# Patient Record
Sex: MALE | Race: WHITE | Employment: FULL TIME | URBAN - METROPOLITAN AREA
[De-identification: names, ages, dates, MRNs, and addresses within clinical notes are randomized per-mention and may not be internally consistent; named-entity substitution may affect disease eponyms.]

---

## 2017-03-01 ENCOUNTER — OFFICE VISIT (OUTPATIENT)
Dept: URGENT CARE | Facility: URGENT CARE | Age: 47
End: 2017-03-01
Payer: COMMERCIAL

## 2017-03-01 VITALS
OXYGEN SATURATION: 95 % | BODY MASS INDEX: 28.87 KG/M2 | SYSTOLIC BLOOD PRESSURE: 122 MMHG | TEMPERATURE: 97.1 F | HEART RATE: 103 BPM | WEIGHT: 207 LBS | DIASTOLIC BLOOD PRESSURE: 80 MMHG

## 2017-03-01 DIAGNOSIS — J01.90 ACUTE SINUSITIS WITH COEXISTING CONDITION, NEED PROPHYLACTIC TREATMENT: Primary | ICD-10-CM

## 2017-03-01 DIAGNOSIS — J45.40 MODERATE PERSISTENT ASTHMA WITHOUT COMPLICATION: ICD-10-CM

## 2017-03-01 PROCEDURE — 99214 OFFICE O/P EST MOD 30 MIN: CPT | Performed by: PHYSICIAN ASSISTANT

## 2017-03-01 RX ORDER — CEFDINIR 300 MG/1
300 CAPSULE ORAL 2 TIMES DAILY
Qty: 20 CAPSULE | Refills: 0 | Status: SHIPPED | OUTPATIENT
Start: 2017-03-01 | End: 2017-04-03

## 2017-03-01 NOTE — LETTER
[facility]  [address] [id number] [phone]      March 1, 2017    RE:  Enrico Pedroza                                                                                                                                                       [address] [facility] [address]            To whom it may concern:    Enrico Pedroza was seen in clinic today for illness.  He may return to work when he has been fever free for 24 hours.          Sincerely,        Dorcas Roberts    [facility]

## 2017-03-01 NOTE — PROGRESS NOTES
SUBJECTIVE:   Enrico Pedroza is a 46 year old male presenting with a chief complaint of:  1) sinus congestion for the past week, worsening  2) cough for the past week  3) some wheezing.    Onset of symptoms was 1 week(s) ago.  Course of illness is worsening.    Severity moderate  Current and Associated symptoms: as above  Treatment measures tried include xopenex inhaler.  Has not used asmanex in about a month.  Predisposing factors include HX of asthma.    Past Medical History   Diagnosis Date     Allergic rhinitis, cause unspecified      Intermittent asthma      exercise/allergies/molds     Nephrolithiasis      Patient Active Problem List   Diagnosis     Allergic rhinitis     Lumbago     CARDIOVASCULAR SCREENING; LDL GOAL LESS THAN 160     Elbow pain     Moderate persistent asthma     Ureteral stone     Social History   Substance Use Topics     Smoking status: Former Smoker     Quit date: 1/1/1995     Smokeless tobacco: Never Used     Alcohol use 0.5 - 1.0 oz/week     1 - 2 drink(s) per week      Comment: once weekly       ROS:  CONSTITUTIONAL:NEGATIVE for fever, chills, change in weight  INTEGUMENTARY/SKIN: NEGATIVE for worrisome rashes, moles or lesions  EYES: NEGATIVE for vision changes or irritation  ENT/MOUTH: as per HPI  RESP:as per HPI  CV: NEGATIVE for chest pain, palpitations or peripheral edema  GI: NEGATIVE for nausea, abdominal pain, heartburn, or change in bowel habits  : negative for dysuria, hematuria, decreased urinary stream, erectile dysfunction  MUSCULOSKELETAL: NEGATIVE for significant arthralgias or myalgia  ENDOCRINE: NEGATIVE for temperature intolerance, skin/hair changes    OBJECTIVE  :/80  Pulse 103  Temp 97.1  F (36.2  C) (Oral)  Wt 207 lb (93.9 kg)  SpO2 95%  BMI 28.87 kg/m2  GENERAL APPEARANCE: healthy, alert and no distress  EYES: EOMI,  PERRL, conjunctiva clear  HENT: ear canals and TM's normal.  Nose and mouth without ulcers, erythema or lesions  HENT: nasal turbinates  boggy with bluish hue and rhinorrhea yellow  NECK: supple, nontender, no lymphadenopathy  RESP: lungs clear to auscultation - no rales, rhonchi or wheezes  CV: regular rates and rhythm, normal S1 S2, no murmur noted  ABDOMEN:  soft, nontender, no HSM or masses and bowel sounds normal  NEURO: Normal strength and tone, sensory exam grossly normal,  normal speech and mentation  SKIN: no suspicious lesions or rashes    (J01.90) Acute sinusitis with coexisting condition, need prophylactic treatment  (primary encounter diagnosis)  Comment:with underlying allergies  Plan: cefdinir (OMNICEF) 300 MG capsule        Restart flonase    (J45.40) Moderate persistent asthma without complication  Comment:   Plan: xopenex.  Restart Asmanex.      F/U with PCP should symptoms persist or worsen.    Patient expresses understanding and agreement with the assessment and plan as above.

## 2017-03-01 NOTE — NURSING NOTE
"Chief Complaint   Patient presents with     Cough     cough, chest congestion, headache, running stuffy nose and very fatigue for 1+ weeks.        Initial /80  Pulse 103  Temp 97.1  F (36.2  C) (Oral)  Wt 207 lb (93.9 kg)  SpO2 95%  BMI 28.87 kg/m2 Estimated body mass index is 28.87 kg/(m^2) as calculated from the following:    Height as of 11/21/16: 5' 11\" (1.803 m).    Weight as of this encounter: 207 lb (93.9 kg).  Medication Reconciliation: complete    "

## 2017-03-01 NOTE — MR AVS SNAPSHOT
"              After Visit Summary   3/1/2017    Enrico Pedroza    MRN: [id number]           Patient Information     Date Of Birth          1970        Visit Information        Provider Department      3/1/2017 9:45 AM Dorcas Cerna PA-C [facility]        Today's Diagnoses     Acute sinusitis with coexisting condition, need prophylactic treatment    -  1    Moderate persistent asthma without complication           Follow-ups after your visit        Who to contact     If you have questions or need follow up information about today's clinic visit or your schedule please contact [facility] directly at [phone].  Normal or non-critical lab and imaging results will be communicated to you by MyChart, letter or phone within 4 business days after the clinic has received the results. If you do not hear from us within 7 days, please contact the clinic through MyChart or phone. If you have a critical or abnormal lab result, we will notify you by phone as soon as possible.  Submit refill requests through Assembly or call your pharmacy and they will forward the refill request to us. Please allow 3 business days for your refill to be completed.          Additional Information About Your Visit        MyChart Information     Assembly lets you send messages to your doctor, view your test results, renew your prescriptions, schedule appointments and more. To sign up, go to [email or web address] . Click on \"Log in\" on the left side of the screen, which will take you to the Welcome page. Then click on \"Sign up Now\" on the right side of the page.     You will be asked to enter the access code listed below, as well as some personal information. Please follow the directions to create your username and password.     Your access code is: [email or web address]  Expires: 2017 10:40 AM     Your access code will  in 90 days. If you need help or a new code, please " call your [place] clinic or [phone].        Care EveryWhere ID     This is your Care EveryWhere ID. This could be used by other organizations to access your [place] medical records  [id number]        Your Vitals Were     Pulse Temperature Pulse Oximetry BMI (Body Mass Index)          103 97.1  F (36.2  C) (Oral) 95% 28.87 kg/m2         Blood Pressure from Last 3 Encounters:   03/01/17 122/80   11/21/16 110/80   08/16/16 110/62    Weight from Last 3 Encounters:   03/01/17 207 lb (93.9 kg)   11/21/16 213 lb 6.4 oz (96.8 kg)   08/16/16 210 lb (95.3 kg)              Today, you had the following     No orders found for display         Today's Medication Changes          These changes are accurate as of: 3/1/17 10:40 AM.  If you have any questions, ask your nurse or doctor.               Start taking these medicines.        Dose/Directions    cefdinir 300 MG capsule   Commonly known as:  OMNICEF   Used for:  Acute sinusitis with coexisting condition, need prophylactic treatment        Dose:  300 mg   Take 1 capsule (300 mg) by mouth 2 times daily   Quantity:  20 capsule   Refills:  0            Where to get your medicines      These medications were sent to [facility]/pharmacy [address] [place] [address] [address] [address]     Phone:  [phone]     cefdinir 300 MG capsule                Primary Care Provider Office Phone # Fax #    William Green [phone]       [facility] [address] [facility] [address]        Thank you!     Thank you for choosing [facility]  for your care. Our goal is always to provide you with excellent care. Hearing back from our patients is one way we can continue to improve our services. Please take a few minutes to complete the written survey that you may receive in the mail after your visit with us. Thank you!             Your Updated Medication List - Protect others  around you: Learn how to safely use, store and throw away your medicines at www.disposemymeds.org.          This list is accurate as of: 3/1/17 10:40 AM.  Always use your most recent med list.                   Brand Name Dispense Instructions for use    cefdinir 300 MG capsule    OMNICEF    20 capsule    Take 1 capsule (300 mg) by mouth 2 times daily       cetirizine 10 MG tablet    zyrTEC    90 tablet    Take 10 mg by mouth daily as needed       ibuprofen 600 MG tablet    ADVIL/MOTRIN    30 tablet    Take 1 tablet (600 mg) by mouth every 6 hours as needed for moderate pain       levalbuterol 45 MCG/ACT Inhaler    XOPENEX HFA    1 Inhaler    Inhale 1-2 puffs into the lungs every 6 hours as needed for shortness of breath / dyspnea       meclizine 25 MG tablet    ANTIVERT    60    1-2 TABLETS  EVERY 6 HOURS AS NEED FOR  DIZZNESS       mometasone 110 MCG/INH inhaler    ASMANEX 30 METERED DOSES    1 Inhaler    Inhale 1 puff into the lungs daily       mometasone 50 MCG/ACT spray    NASONEX    17 g    Spray 2 sprays into both nostrils daily       OMEPRAZOLE PO      Take by mouth every morning

## 2017-03-08 ENCOUNTER — TELEPHONE (OUTPATIENT)
Dept: INTERNAL MEDICINE | Facility: CLINIC | Age: 47
End: 2017-03-08

## 2017-03-08 DIAGNOSIS — J45.40 MODERATE PERSISTENT ASTHMA WITHOUT COMPLICATION: ICD-10-CM

## 2017-03-08 RX ORDER — LEVALBUTEROL TARTRATE 45 UG/1
1-2 AEROSOL, METERED ORAL EVERY 6 HOURS PRN
Qty: 1 INHALER | Refills: 0 | Status: SHIPPED | OUTPATIENT
Start: 2017-03-08 | End: 2017-04-03

## 2017-03-08 NOTE — TELEPHONE ENCOUNTER
Routing refill request to provider for review/approval because:  Patient needs to be seen because:  More than 6 months since last office visit.  Last seen 6/2016.

## 2017-03-08 NOTE — TELEPHONE ENCOUNTER
Reason for Call:  Medication or medication refill:    Do you use a [facility]?  Name of the pharmacy and phone number for the current request:  [facility]ACY #[id number] - [place], [place] - [address] [facility]    Name of the medication requested: levalbuterol (XOPENEX HFA) 45 MCG/ACT inhaler    Other request:     Can we leave a detailed message on this number? YES    Phone number patient can be reached at: Cell number on file:    Telephone Information:   Mobile [phone]       Best Time: anytime    Call taken on 3/8/2017 at 1:53 PM by MIL ARROYO

## 2017-03-08 NOTE — TELEPHONE ENCOUNTER
Prescription approved per [facility].  Given one month extension, needs MD apt.

## 2017-03-08 NOTE — TELEPHONE ENCOUNTER
levalbuterol (XOPENEX HFA) 45 MCG/ACT inhaler       Last Written Prescription Date: 6/6/16  Last Fill Quantity: 1, # refills: 11    Last Office Visit with [facility], P or [facility] prescribing provider:  7/20/16   Future Office Visit:       Date of Last Asthma Action Plan Letter:   Asthma Action Plan Q1 Year    Topic Date Due     Asthma Action Plan - yearly  03/20/2015      Asthma Control Test:   ACT Total Scores 6/6/2016   ACT TOTAL SCORE -   ASTHMA ER VISITS -   ASTHMA HOSPITALIZATIONS -   ACT TOTAL SCORE (Goal Greater than or Equal to 20) 20   In the past 12 months, how many times did you visit the emergency room for your asthma without being admitted to the hospital? 0   In the past 12 months, how many times were you hospitalized overnight because of your asthma? 0       Date of Last Spirometry Test:   No results found for this or any previous visit.

## 2017-03-09 DIAGNOSIS — J45.40 MODERATE PERSISTENT ASTHMA WITHOUT COMPLICATION: ICD-10-CM

## 2017-03-09 RX ORDER — LEVALBUTEROL TARTRATE 45 UG/1
1-2 AEROSOL, METERED ORAL EVERY 6 HOURS PRN
Qty: 1 INHALER | Refills: 0 | Status: CANCELLED | OUTPATIENT
Start: 2017-03-09

## 2017-03-09 NOTE — TELEPHONE ENCOUNTER
levalbuterol (XOPENEX HFA) 45 MCG/ACT        Last Written Prescription Date: 3/8/17  Last Fill Quantity: 1, # refills: 0    Last Office Visit with [facility], [facility] or [facility] prescribing provider:  7/20/16   Future Office Visit:       Date of Last Asthma Action Plan Letter:   Asthma Action Plan Q1 Year    Topic Date Due     Asthma Action Plan - yearly  03/20/2015      Asthma Control Test:   ACT Total Scores 6/6/2016   ACT TOTAL SCORE -   ASTHMA ER VISITS -   ASTHMA HOSPITALIZATIONS -   ACT TOTAL SCORE (Goal Greater than or Equal to 20) 20   In the past 12 months, how many times did you visit the emergency room for your asthma without being admitted to the hospital? 0   In the past 12 months, how many times were you hospitalized overnight because of your asthma? 0       Date of Last Spirometry Test:   No results found for this or any previous visit.

## 2017-04-03 ENCOUNTER — TELEPHONE (OUTPATIENT)
Dept: INTERNAL MEDICINE | Facility: CLINIC | Age: 47
End: 2017-04-03

## 2017-04-03 ENCOUNTER — OFFICE VISIT (OUTPATIENT)
Dept: INTERNAL MEDICINE | Facility: CLINIC | Age: 47
End: 2017-04-03
Payer: COMMERCIAL

## 2017-04-03 VITALS
SYSTOLIC BLOOD PRESSURE: 126 MMHG | WEIGHT: 206.5 LBS | DIASTOLIC BLOOD PRESSURE: 84 MMHG | BODY MASS INDEX: 28.91 KG/M2 | TEMPERATURE: 98 F | HEART RATE: 90 BPM | HEIGHT: 71 IN | OXYGEN SATURATION: 93 %

## 2017-04-03 DIAGNOSIS — J45.40 MODERATE PERSISTENT ASTHMA WITHOUT COMPLICATION: Primary | ICD-10-CM

## 2017-04-03 DIAGNOSIS — J30.1 SEASONAL ALLERGIC RHINITIS DUE TO POLLEN: ICD-10-CM

## 2017-04-03 DIAGNOSIS — K52.9 CHRONIC DIARRHEA: ICD-10-CM

## 2017-04-03 PROCEDURE — 99214 OFFICE O/P EST MOD 30 MIN: CPT | Performed by: INTERNAL MEDICINE

## 2017-04-03 RX ORDER — LEVALBUTEROL TARTRATE 45 UG/1
1-2 AEROSOL, METERED ORAL EVERY 6 HOURS PRN
Qty: 1 INHALER | Refills: 11 | Status: SHIPPED | OUTPATIENT
Start: 2017-04-03 | End: 2018-06-26

## 2017-04-03 NOTE — PATIENT INSTRUCTIONS
Can try one of the following over the counter antihistamines:  Loratadine (Claritin)  Fexofenadine (allegra)  Cetirizine (zyrtec)  Levocetirizine (xyzal)    Can be taken as needed     If you still have symptoms despite using these try adding one of the following nasal steroids available over the counter:  -flonase  -nasacort  -rhinocort    These need to be taken daily in order to work     High Fiber Diet  Fiber is present in fruits, vegetables, cereals, and grains. Fiber passes through the body undigested. A high fiber diet helps food move through the intestinal tract. The added bulk is helpful in preventing constipation. In people with diverticulosis it helps clean out the pouches along the colon wall and prevents new ones from forming. A high fiber diet also reduces the risk of colon cancer, decreases blood cholesterol, and prevents high blood sugar in people with diabetes.    The foods listed below are high in fiber and should be included in your diet. If you are not used to high fiber foods, start with 1 or 2 foods from this list. Every 3 to 4 days add a new one to your diet until you are eating 4 high fiber foods per day. This should give you 20 to 35 grams of fiber/day. It is also important to drink a lot of water when you are on this diet (6 to 8 glasses a day). Water causes the fiber to swell and increases the benefit.  Foods high in dietary fiber  The following foods are high in dietary fiber:    Breads. Breads made with 100% whole wheat flour; marybel, wheat, or rye crackers; whole grain tortillas, bran muffins.    Cereals. Whole grain and bran cereals with bran (shredded wheat, wheat flakes, raisin bran, corn bran), oatmeal, rolled oats, granola, and brown rice.    Nuts. Any nuts and seeds.    Fruits. Fresh fruits along with edible skins (bananas, citrus fruit, mangoes, pears, prunes, raisins, berries, apples, pineapple, and apricots) and prune juice.    Vegetables. All types, preferably raw or lightly  cooked: especially green peas, celery, eggplant, potatoes, spinach, broccoli, brussels sprouts, winter squash, carrots, cauliflower, soybeans, lentils, fresh and dried beans of all kinds.    Other. Popcorn, any spices.    9384-4966 [employer]. [address] [place] [address]. All rights reserved. This information is not intended as a substitute for professional medical care. Always follow your healthcare professional's instructions.

## 2017-04-03 NOTE — PROGRESS NOTES
"  SUBJECTIVE:                                                    Enrico Pedroza is a 46 year old male who presents to clinic today for the following health issues:    1. Asthma Follow-Up    Was ACT completed today?    Yes    ACT Total Scores 4/3/2017   ACT TOTAL SCORE -   ASTHMA ER VISITS -   ASTHMA HOSPITALIZATIONS -   ACT TOTAL SCORE (Goal Greater than or Equal to 20) 20   In the past 12 months, how many times did you visit the emergency room for your asthma without being admitted to the hospital? 0   In the past 12 months, how many times were you hospitalized overnight because of your asthma? 0       Recent asthma triggers that patient is dealing with: None      Amount of exercise or physical activity: None    Problems taking medications regularly: No    Medication side effects: none    Diet: regular (no restrictions)    2. Chronic diarrhea  - never mentioned prior to today but reports yrs of loose , freq stools. No weight loss. No constipation.   - No change with lactose elimination. No other dietary changes attempted  - no BRBPR or any blood w/diarrhea  - no cramps or abd pain     Problem list and histories reviewed & adjusted, as indicated.  Additional history: as documented    Labs reviewed in EPIC    Reviewed and updated as needed this visit by clinical staff  Tobacco  Allergies  Soc Hx      Reviewed and updated as needed this visit by Provider         ROS:  Constitutional, HEENT, cardiovascular, pulmonary, gi and gu systems are negative, except as otherwise noted.    OBJECTIVE:                                                    /84  Pulse 90  Temp 98  F (36.7  C) (Oral)  Ht 5' 11\" (1.803 m)  Wt 206 lb 8 oz (93.7 kg)  SpO2 93%  BMI 28.8 kg/m2  Body mass index is 28.8 kg/(m^2).  GENERAL APPEARANCE: healthy, alert and no distress  HENT: nose and mouth without ulcers or lesions  NECK: no adenopathy, no asymmetry, masses, or scars and thyroid normal to palpation  RESP: lungs clear to " auscultation - no rales, rhonchi or wheezes  CV: regular rates and rhythm, normal S1 S2, no S3 or S4 and no murmur, click or rub  ABDOMEN: soft, nontender, without hepatosplenomegaly or masses and bowel sounds normal  MS: extremities normal- no gross deformities noted  SKIN: no suspicious lesions or rashes    Diagnostic test results:  none      ASSESSMENT/PLAN:                                                    1. Moderate persistent asthma without complication  Due to allergies- not well controlled  - levalbuterol (XOPENEX HFA) 45 MCG/ACT Inhaler; Inhale 1-2 puffs into the lungs every 6 hours as needed for shortness of breath / dyspnea  Dispense: 1 Inhaler; Refill: 11  - mometasone (ASMANEX 30 METERED DOSES) 110 MCG/INH inhaler; Inhale 1 puff into the lungs daily  Dispense: 1 Inhaler; Refill: 11    2. Seasonal allergic rhinitis due to pollen  AH + nasal steroid    3. Chronic diarrhea  W/u with labs, stool studies, colonoscopy       Follow up with Provider - prpedro Green MD  [facility]

## 2017-04-03 NOTE — NURSING NOTE
"Chief Complaint   Patient presents with     Asthma       Initial /84  Pulse 90  Temp 98  F (36.7  C) (Oral)  Ht 5' 11\" (1.803 m)  Wt 206 lb 8 oz (93.7 kg)  SpO2 93%  BMI 28.8 kg/m2 Estimated body mass index is 28.8 kg/(m^2) as calculated from the following:    Height as of this encounter: 5' 11\" (1.803 m).    Weight as of this encounter: 206 lb 8 oz (93.7 kg).  Medication Reconciliation: complete    "

## 2017-04-03 NOTE — MR AVS SNAPSHOT
After Visit Summary   4/3/2017    Enrico Pedroza    MRN: [id number]           Patient Information     Date Of Birth          1970        Visit Information        Provider Department      4/3/2017 8:40 AM William Green MD [facility]        Today's Diagnoses     Moderate persistent asthma without complication    -  1    Seasonal allergic rhinitis due to pollen        Chronic diarrhea          Care Instructions    Can try one of the following over the counter antihistamines:  Loratadine (Claritin)  Fexofenadine (allegra)  Cetirizine (zyrtec)  Levocetirizine (xyzal)    Can be taken as needed     If you still have symptoms despite using these try adding one of the following nasal steroids available over the counter:  -flonase  -nasacort  -rhinocort    These need to be taken daily in order to work     High Fiber Diet  Fiber is present in fruits, vegetables, cereals, and grains. Fiber passes through the body undigested. A high fiber diet helps food move through the intestinal tract. The added bulk is helpful in preventing constipation. In people with diverticulosis it helps clean out the pouches along the colon wall and prevents new ones from forming. A high fiber diet also reduces the risk of colon cancer, decreases blood cholesterol, and prevents high blood sugar in people with diabetes.    The foods listed below are high in fiber and should be included in your diet. If you are not used to high fiber foods, start with 1 or 2 foods from this list. Every 3 to 4 days add a new one to your diet until you are eating 4 high fiber foods per day. This should give you 20 to 35 grams of fiber/day. It is also important to drink a lot of water when you are on this diet (6 to 8 glasses a day). Water causes the fiber to swell and increases the benefit.  Foods high in dietary fiber  The following foods are high in dietary fiber:    Breads. Breads made with 100% whole wheat flour;  marybel, wheat, or rye crackers; whole grain tortillas, bran muffins.    Cereals. Whole grain and bran cereals with bran (shredded wheat, wheat flakes, raisin bran, corn bran), oatmeal, rolled oats, granola, and brown rice.    Nuts. Any nuts and seeds.    Fruits. Fresh fruits along with edible skins (bananas, citrus fruit, mangoes, pears, prunes, raisins, berries, apples, pineapple, and apricots) and prune juice.    Vegetables. All types, preferably raw or lightly cooked: especially green peas, celery, eggplant, potatoes, spinach, broccoli, brussels sprouts, winter squash, carrots, cauliflower, soybeans, lentils, fresh and dried beans of all kinds.    Other. Popcorn, any spices.    5441-1940 The [employer]. [address] [place] [address]. All rights reserved. This information is not intended as a substitute for professional medical care. Always follow your healthcare professional's instructions.              Follow-ups after your visit        Who to contact     If you have questions or need follow up information about today's clinic visit or your schedule please contact [facility] directly at [phone].  Normal or non-critical lab and imaging results will be communicated to you by Audiosockethart, letter or phone within 4 business days after the clinic has received the results. If you do not hear from us within 7 days, please contact the clinic through Audiosockethart or phone. If you have a critical or abnormal lab result, we will notify you by phone as soon as possible.  Submit refill requests through nWay or call your pharmacy and they will forward the refill request to us. Please allow 3 business days for your refill to be completed.          Additional Information About Your Visit        nWay Information     nWay lets you send messages to your doctor, view your test results, renew your prescriptions, schedule appointments and more. To sign up, go to  [email or web address] . Click on \"Log in\" on the left side of the screen, which will take you to the Welcome page. Then click on \"Sign up Now\" on the right side of the page.     You will be asked to enter the access code listed below, as well as some personal information. Please follow the directions to create your username and password.     Your access code is: [email or web address]  Expires: 2017 11:40 AM     Your access code will  in 90 days. If you need help or a new code, please call your [place] clinic or [phone].        Care EveryWhere ID     This is your Care EveryWhere ID. This could be used by other organizations to access your [place] medical records  [id number]        Your Vitals Were     Pulse Temperature Height Pulse Oximetry BMI (Body Mass Index)       90 98  F (36.7  C) (Oral) 5' 11\" (1.803 m) 93% 28.8 kg/m2        Blood Pressure from Last 3 Encounters:   17 126/84   17 122/80   16 110/80    Weight from Last 3 Encounters:   17 206 lb 8 oz (93.7 kg)   17 207 lb (93.9 kg)   16 213 lb 6.4 oz (96.8 kg)              Today, you had the following     No orders found for display         Today's Medication Changes          These changes are accurate as of: 4/3/17  9:18 AM.  If you have any questions, ask your nurse or doctor.               These medicines have changed or have updated prescriptions.        Dose/Directions    * mometasone 110 MCG/INH inhaler   Commonly known as:  ASMANEX 30 METERED DOSES   This may have changed:  Another medication with the same name was added. Make sure you understand how and when to take each.   Used for:  Moderate persistent asthma without complication   Changed by:  William Green MD        Dose:  1 puff   Inhale 1 puff into the lungs daily   Quantity:  1 Inhaler   Refills:  11       * mometasone 110 MCG/INH inhaler   Commonly known as:  ASMANEX 30 METERED DOSES   This may have changed:  You were already taking a " medication with the same name, and this prescription was added. Make sure you understand how and when to take each.   Used for:  Moderate persistent asthma without complication   Changed by:  William Green MD        Dose:  1 puff   Inhale 1 puff into the lungs daily   Quantity:  1 Inhaler   Refills:  11       * Notice:  This list has 2 medication(s) that are the same as other medications prescribed for you. Read the directions carefully, and ask your doctor or other care provider to review them with you.         Where to get your medicines      These medications were sent to [facility]/pharmacy #[id number] [place], [place] - [address]  [address] [address] [address]     Phone:  [phone]     levalbuterol 45 MCG/ACT Inhaler    mometasone 110 MCG/INH inhaler                Primary Care Provider Office Phone # Fax #    William Green [phone]       [facility] [address] [facility] [address]        Thank you!     Thank you for choosing [facility]  for your care. Our goal is always to provide you with excellent care. Hearing back from our patients is one way we can continue to improve our services. Please take a few minutes to complete the written survey that you may receive in the mail after your visit with us. Thank you!             Your Updated Medication List - Protect others around you: Learn how to safely use, store and throw away your medicines at www.disposemymeds.org.          This list is accurate as of: 4/3/17  9:18 AM.  Always use your most recent med list.                   Brand Name Dispense Instructions for use    cetirizine 10 MG tablet    zyrTEC    90 tablet    Take 10 mg by mouth daily as needed       ibuprofen 600 MG tablet    ADVIL/MOTRIN    30 tablet    Take 1 tablet (600 mg) by mouth every 6 hours as needed for moderate pain       levalbuterol 45 MCG/ACT Inhaler    XOPENEX HFA    1 Inhaler    Inhale 1-2  puffs into the lungs every 6 hours as needed for shortness of breath / dyspnea       meclizine 25 MG tablet    ANTIVERT    60    1-2 TABLETS  EVERY 6 HOURS AS NEED FOR  DIZZNESS       * mometasone 110 MCG/INH inhaler    ASMANEX 30 METERED DOSES    1 Inhaler    Inhale 1 puff into the lungs daily       * mometasone 110 MCG/INH inhaler    ASMANEX 30 METERED DOSES    1 Inhaler    Inhale 1 puff into the lungs daily       mometasone 50 MCG/ACT spray    NASONEX    17 g    Spray 2 sprays into both nostrils daily       OMEPRAZOLE PO      Take by mouth every morning       * Notice:  This list has 2 medication(s) that are the same as other medications prescribed for you. Read the directions carefully, and ask your doctor or other care provider to review them with you.

## 2017-04-03 NOTE — TELEPHONE ENCOUNTER
call pt  Tell him that he should schedule a lab only appt for some blood work and  stool collection devices for stool studies for chronic diarrhea  Colonoscopy also scheduled

## 2017-04-04 ASSESSMENT — ASTHMA QUESTIONNAIRES: ACT_TOTALSCORE: 20

## 2017-04-18 ENCOUNTER — OFFICE VISIT (OUTPATIENT)
Dept: INTERNAL MEDICINE | Facility: CLINIC | Age: 47
End: 2017-04-18
Payer: COMMERCIAL

## 2017-04-18 VITALS
TEMPERATURE: 98.2 F | OXYGEN SATURATION: 96 % | BODY MASS INDEX: 34.62 KG/M2 | SYSTOLIC BLOOD PRESSURE: 108 MMHG | HEART RATE: 90 BPM | DIASTOLIC BLOOD PRESSURE: 82 MMHG | HEIGHT: 64 IN | WEIGHT: 202.8 LBS

## 2017-04-18 DIAGNOSIS — K57.92 ACUTE DIVERTICULITIS: Primary | ICD-10-CM

## 2017-04-18 PROCEDURE — 99214 OFFICE O/P EST MOD 30 MIN: CPT | Performed by: INTERNAL MEDICINE

## 2017-04-18 NOTE — PATIENT INSTRUCTIONS
*  Most likely diverticulitis, (infection of a diverticulum in your sigmoid colon)    *  Antibiotic:  Augmentin 1 tablet twice per day every day for 10 days.    *  Most common side effect from Augmentin is loose stools or diarrhea.  Let us know if this is a deal breaker for taking this    *  If you develop any worsening of your pain or significant worsening in your symptoms, then go to the hospital immediately.     *  Delay the scheduled labs from Dr. Green for another couple of weeks.     *  Do NOT get any colonoscopy until at least 6-8 weeks after the resolution of this current episode.     *  You need to proceed with the workup on the chronic diarrhea as planned, just need to delay things a bit due to this acute infection.

## 2017-04-18 NOTE — PROGRESS NOTES
SUBJECTIVE:                                                    Enrico Pedroza is a 46 year old male who presents to clinic today for the following health issues:    Concern - Diverticulitis/diarrhea     Onset: 3 days ago    Description:   Sharp and dull pain in lower abd area    Intensity: moderate, severe    Progression of Symptoms:  constant    Accompanying Signs & Symptoms:  Diarrhea, worsened by coughing, pressure on lower abd area.Pt has not has BM in a couple days but when he does it is normally diarrhea       Previous history of similar problem:   Yes, has hx of diverticulitis. Was advised by Dr. Green to get colonoscopy    Precipitating factors:   Worsened by: not able to figure out which foods aggravate    Alleviating factors:  Improved by: as long as pt is not standing       Therapies Tried and outcome: abx and liquid diet w/ relief in the past          Problem list and histories reviewed & adjusted, as indicated.  Additional history: as documented        Reviewed and updated as needed this visit by clinical staff  Tobacco  Allergies       Reviewed and updated as needed this visit by Provider           Past Medical History:  ---------------------------  Past Medical History:   Diagnosis Date     Allergic rhinitis, cause unspecified      Intermittent asthma     exercise/allergies/molds     Nephrolithiasis        Past Surgical History:  ---------------------------  Past Surgical History:   Procedure Laterality Date     COMBINED CYSTOSCOPY, RETROGRADES, URETEROSCOPY, INSERT STENT  3/5/2014    Procedure: COMBINED CYSTOSCOPY, RETROGRADES, URETEROSCOPY, INSERT STENT;  CYSTOSCOPY, FLEXIBLE URETEROSCOPY, LEFT STENT PLACEMENT, LEFT RETROGRADES, STONE EXTRACTION;  Surgeon: Kenny Marroquin MD;  Location: [facility] OR     NO HISTORY OF SURGERY         Current Medications:  ---------------------------  Current Outpatient Prescriptions   Medication Sig Dispense Refill     levalbuterol (XOPENEX HFA) 45 MCG/ACT  Inhaler Inhale 1-2 puffs into the lungs every 6 hours as needed for shortness of breath / dyspnea 1 Inhaler 11     mometasone (ASMANEX 30 METERED DOSES) 110 MCG/INH inhaler Inhale 1 puff into the lungs daily 1 Inhaler 11     OMEPRAZOLE PO Take by mouth every morning       ibuprofen (ADVIL,MOTRIN) 600 MG tablet Take 1 tablet (600 mg) by mouth every 6 hours as needed for moderate pain 30 tablet 0     mometasone (NASONEX) 50 MCG/ACT nasal spray Spray 2 sprays into both nostrils daily 17 g 11     mometasone (ASMANEX 30 METERED DOSES) 110 MCG/INH inhaler Inhale 1 puff into the lungs daily 1 Inhaler 11     cetirizine (ZYRTEC) 10 MG tablet Take 10 mg by mouth daily as needed  90 tablet 3     MECLIZINE HCL 25 MG OR TABS 1-2 TABLETS  EVERY 6 HOURS AS NEED FOR  DIZZNESS 60 3       Allergies:  -------------  Allergies   Allergen Reactions     Albuterol Difficulty breathing     Lung pain      No Clinical Screening - See Comments Difficulty breathing     Habanero peppers and ingredients in Orange Crush; SOB, sweating       Social History:  -------------------  Social History     Social History     Marital status:      Spouse name: N/A     Number of children: N/A     Years of education: N/A     Occupational History     Not on file.     Social History Main Topics     Smoking status: Former Smoker     Quit date: 1995     Smokeless tobacco: Never Used     Alcohol use 0.5 - 1.0 oz/week     1 - 2 drink(s) per week      Comment: once weekly     Drug use: No     Sexual activity: Yes     Partners: Female     Other Topics Concern     Not on file     Social History Narrative       Family Medical History:  ------------------------------  Family History   Problem Relation Age of Onset     DIABETES Other      cousin and grandfather     DHAVAL. Paternal Grandfather       MI     CANCER Sister      skin         ROS:  REVIEW OF SYSTEMS:    RESP: negative for cough, dyspnea, wheezing, hemoptysis  CV: negative for chest pain,  "palpitations, PND, VELÁSQUEZ, orthopnea; reports no changes in their ability to perform physical activity (from cardiovascular standpoint)  GI: negative for dysphagia, N/V, melena, diarrhea and constipation  NEURO: negative for numbness/tingling, paralysis, incoordination, or focal weakness     OBJECTIVE:                                                    /82  Pulse 90  Temp 98.2  F (36.8  C) (Oral)  Ht 5' 4\" (1.626 m)  Wt 202 lb 12.8 oz (92 kg)  SpO2 96%  BMI 34.81 kg/m2     GENERAL alert and no distress  EYES:  Normal sclera,conjunctiva, EOMI  HENT: oral and posterior pharynx without lesions or erythema, facies symmetric  NECK: Neck supple. No LAD, without thyroidmegaly or JVD., Carotids without bruits.  RESP: Clear to ausculation bilaterally without wheezes or crackles. Normal BS in all fields.  CV: RRR normal S1S2 without murmurs, rubs or gallops. PMI normal  ABDS:  Tender to palption in left lower abdomen, no rebound no guarding  LYMPH: no cervical lymph adenopathy appreciated  MS: extremities- no gross deformities of the visible extremities noted, no edema  PSYCH: Alert and oriented times 3; speech- coherent  SKIN:  No obvious significant skin lesions on visible portions of face          ASSESSMENT/PLAN:                                                      (K57.92) Acute diverticulitis  (primary encounter diagnosis)  Comment: The symptoms and exam appear to be most consistent with diverticulitis.    Review the pathophysiology and anatomical/mechanical issues of diverticulosis and diverticulitis.  At this time, an Abd CT scan is not indicated.  Will treat with combination of cipro/flagyl for 10 days.    Discussed cipro and flagyl as the treatment of choice.   Discussed side effects of ciprofloxin including but not limited to diarrhea, achilles tenodon rupture, and GI upset.  Discussed the side effects of metronidazole (flagyl) including nausea/vomitting, metallic taste, altered taste sensation, and risk " of disulfram reaction associated with any alcohol use and therefore instructed the patient to avoid any and all alcohol while on this medication.  They were instructed to call if any side effects occur.   Told to go to the ER immediately if the symptoms worsen or change significantly in any way.     Plan: amoxicillin-clavulanate (AUGMENTIN) 875-125 MG         per tablet               *  Most likely diverticulitis, (infection of a diverticulum in your sigmoid colon)    *  Antibiotic:  Augmentin 1 tablet twice per day every day for 10 days.    *  Most common side effect from Augmentin is loose stools or diarrhea.  Let us know if this is a deal breaker for taking this    *  If you develop any worsening of your pain or significant worsening in your symptoms, then go to the hospital immediately.     *  Delay the scheduled labs from Dr. Green for another couple of weeks.     *  Do NOT get any colonoscopy until at least 6-8 weeks after the resolution of this current episode.     *  You need to proceed with the workup on the chronic diarrhea as planned, just need to delay things a bit due to this acute infection.            See Patient Instructions    GINNY FIELDS M.D., MD  [facility]

## 2017-04-18 NOTE — MR AVS SNAPSHOT
After Visit Summary   4/18/2017    Enrico Pedroza    MRN: [id number]           Patient Information     Date Of Birth          1970        Visit Information        Provider Department      4/18/2017 8:40 AM Efrain Mcqueen MD [facility]        Today's Diagnoses     Acute diverticulitis    -  1      Care Instructions    *  Most likely diverticulitis, (infection of a diverticulum in your sigmoid colon)    *  Antibiotic:  Augmentin 1 tablet twice per day every day for 10 days.    *  Most common side effect from Augmentin is loose stools or diarrhea.  Let us know if this is a deal breaker for taking this    *  If you develop any worsening of your pain or significant worsening in your symptoms, then go to the hospital immediately.     *  Delay the scheduled labs from Dr. Green for another couple of weeks.     *  Do NOT get any colonoscopy until at least 6-8 weeks after the resolution of this current episode.     *  You need to proceed with the workup on the chronic diarrhea as planned, just need to delay things a bit due to this acute infection.              Follow-ups after your visit        Who to contact     If you have questions or need follow up information about today's clinic visit or your schedule please contact [facility] directly at [phone].  Normal or non-critical lab and imaging results will be communicated to you by MyChart, letter or phone within 4 business days after the clinic has received the results. If you do not hear from us within 7 days, please contact the clinic through MyChart or phone. If you have a critical or abnormal lab result, we will notify you by phone as soon as possible.  Submit refill requests through eyetok or call your pharmacy and they will forward the refill request to us. Please allow 3 business days for your refill to be completed.          Additional Information About Your Visit        MyChart  "Information     Metaversum lets you send messages to your doctor, view your test results, renew your prescriptions, schedule appointments and more. To sign up, go to [email or web address] . Click on \"Log in\" on the left side of the screen, which will take you to the Welcome page. Then click on \"Sign up Now\" on the right side of the page.     You will be asked to enter the access code listed below, as well as some personal information. Please follow the directions to create your username and password.     Your access code is: [email or web address]  Expires: 2017 11:40 AM     Your access code will  in 90 days. If you need help or a new code, please call your [place] clinic or [phone].        Care EveryWhere ID     This is your Care EveryWhere ID. This could be used by other organizations to access your [place] medical records  [id number]        Your Vitals Were     Pulse Temperature Height Pulse Oximetry BMI (Body Mass Index)       90 98.2  F (36.8  C) (Oral) 5' 4\" (1.626 m) 96% 34.81 kg/m2        Blood Pressure from Last 3 Encounters:   17 108/82   17 126/84   17 122/80    Weight from Last 3 Encounters:   17 202 lb 12.8 oz (92 kg)   17 206 lb 8 oz (93.7 kg)   17 207 lb (93.9 kg)              Today, you had the following     No orders found for display         Today's Medication Changes          These changes are accurate as of: 17  9:29 AM.  If you have any questions, ask your nurse or doctor.               Start taking these medicines.        Dose/Directions    amoxicillin-clavulanate 875-125 MG per tablet   Commonly known as:  AUGMENTIN   Used for:  Acute diverticulitis   Started by:  Efrain Mcqueen MD        Dose:  1 tablet   Take 1 tablet by mouth 2 times daily for 10 days   Quantity:  20 tablet   Refills:  0            Where to get your medicines      These medications were sent to [facility]/pharmacy #[address] [place] [address] " [facility] [address]     Phone:  [phone]     amoxicillin-clavulanate 875-125 MG per tablet                Primary Care Provider Office Phone # Fax #    William Green [phone]       [facility] [address]  [facility] [address]        Thank you!     Thank you for choosing [facility]  for your care. Our goal is always to provide you with excellent care. Hearing back from our patients is one way we can continue to improve our services. Please take a few minutes to complete the written survey that you may receive in the mail after your visit with us. Thank you!             Your Updated Medication List - Protect others around you: Learn how to safely use, store and throw away your medicines at www.disposemymeds.org.          This list is accurate as of: 4/18/17  9:29 AM.  Always use your most recent med list.                   Brand Name Dispense Instructions for use    amoxicillin-clavulanate 875-125 MG per tablet    AUGMENTIN    20 tablet    Take 1 tablet by mouth 2 times daily for 10 days       cetirizine 10 MG tablet    zyrTEC    90 tablet    Take 10 mg by mouth daily as needed       ibuprofen 600 MG tablet    ADVIL/MOTRIN    30 tablet    Take 1 tablet (600 mg) by mouth every 6 hours as needed for moderate pain       levalbuterol 45 MCG/ACT Inhaler    XOPENEX HFA    1 Inhaler    Inhale 1-2 puffs into the lungs every 6 hours as needed for shortness of breath / dyspnea       meclizine 25 MG tablet    ANTIVERT    60    1-2 TABLETS  EVERY 6 HOURS AS NEED FOR  DIZZNESS       * mometasone 110 MCG/INH inhaler    ASMANEX 30 METERED DOSES    1 Inhaler    Inhale 1 puff into the lungs daily       * mometasone 110 MCG/INH inhaler    ASMANEX 30 METERED DOSES    1 Inhaler    Inhale 1 puff into the lungs daily       mometasone 50 MCG/ACT spray    NASONEX    17 g    Spray 2 sprays into both nostrils daily       OMEPRAZOLE PO      Take by mouth  every morning       * Notice:  This list has 2 medication(s) that are the same as other medications prescribed for you. Read the directions carefully, and ask your doctor or other care provider to review them with you.

## 2017-04-18 NOTE — NURSING NOTE
"Chief Complaint   Patient presents with     Gastrointestinal Problem     divertivulitis flare up X 3 days       Initial /82  Pulse 90  Temp 98.2  F (36.8  C) (Oral)  Ht 5' 4\" (1.626 m)  Wt 202 lb 12.8 oz (92 kg)  SpO2 96%  BMI 34.81 kg/m2 Estimated body mass index is 34.81 kg/(m^2) as calculated from the following:    Height as of this encounter: 5' 4\" (1.626 m).    Weight as of this encounter: 202 lb 12.8 oz (92 kg).  Medication Reconciliation: complete   Claudia Molina CMA      "

## 2017-04-29 ENCOUNTER — OFFICE VISIT (OUTPATIENT)
Dept: URGENT CARE | Facility: URGENT CARE | Age: 47
End: 2017-04-29
Payer: COMMERCIAL

## 2017-04-29 VITALS
SYSTOLIC BLOOD PRESSURE: 100 MMHG | DIASTOLIC BLOOD PRESSURE: 70 MMHG | HEIGHT: 71 IN | TEMPERATURE: 97.8 F | HEART RATE: 79 BPM | OXYGEN SATURATION: 97 % | WEIGHT: 208.2 LBS | BODY MASS INDEX: 29.15 KG/M2

## 2017-04-29 DIAGNOSIS — R10.32 LLQ ABDOMINAL PAIN: ICD-10-CM

## 2017-04-29 DIAGNOSIS — K57.32 DIVERTICULITIS OF COLON: ICD-10-CM

## 2017-04-29 PROCEDURE — 99213 OFFICE O/P EST LOW 20 MIN: CPT | Performed by: SPECIALIST

## 2017-04-29 RX ORDER — CIPROFLOXACIN 500 MG/1
500 TABLET, FILM COATED ORAL 2 TIMES DAILY
Qty: 20 TABLET | Refills: 0 | Status: SHIPPED | OUTPATIENT
Start: 2017-04-29 | End: 2017-06-21

## 2017-04-29 RX ORDER — METRONIDAZOLE 500 MG/1
500 TABLET ORAL 3 TIMES DAILY
Qty: 30 TABLET | Refills: 0 | Status: SHIPPED | OUTPATIENT
Start: 2017-04-29 | End: 2017-06-21

## 2017-04-29 NOTE — NURSING NOTE
"Initial /70  Pulse 79  Temp 97.8  F (36.6  C) (Oral)  Ht 5' 11\" (1.803 m)  Wt 208 lb 3.2 oz (94.4 kg)  SpO2 97%  BMI 29.04 kg/m2 Estimated body mass index is 29.04 kg/(m^2) as calculated from the following:    Height as of this encounter: 5' 11\" (1.803 m).    Weight as of this encounter: 208 lb 3.2 oz (94.4 kg). .      "

## 2017-04-29 NOTE — MR AVS SNAPSHOT
"              After Visit Summary   2017    Enrico Pedroza    MRN: [id number]           Patient Information     Date Of Birth          1970        Visit Information        Provider Department      2017 11:30 AM Joyce Atkinson MD [facility]        Today's Diagnoses     Diverticulitis of colon        LLQ abdominal pain           Follow-ups after your visit        Follow-up notes from your care team     Return if symptoms worsen or fail to improve.      Who to contact     If you have questions or need follow up information about today's clinic visit or your schedule please contact [facility] directly at [phone].  Normal or non-critical lab and imaging results will be communicated to you by MyChart, letter or phone within 4 business days after the clinic has received the results. If you do not hear from us within 7 days, please contact the clinic through MyChart or phone. If you have a critical or abnormal lab result, we will notify you by phone as soon as possible.  Submit refill requests through [employer] or call your pharmacy and they will forward the refill request to us. Please allow 3 business days for your refill to be completed.          Additional Information About Your Visit        MyChart Information     [employer] lets you send messages to your doctor, view your test results, renew your prescriptions, schedule appointments and more. To sign up, go to [email or web address] . Click on \"Log in\" on the left side of the screen, which will take you to the Welcome page. Then click on \"Sign up Now\" on the right side of the page.     You will be asked to enter the access code listed below, as well as some personal information. Please follow the directions to create your username and password.     Your access code is: [email or web address]  Expires: 2017 11:40 AM     Your access code will  in 90 days. If you need help or a new code, " "please call your [place] clinic or [phone].        Care EveryWhere ID     This is your Care EveryWhere ID. This could be used by other organizations to access your [place] medical records  [id number]        Your Vitals Were     Pulse Temperature Height Pulse Oximetry BMI (Body Mass Index)       79 97.8  F (36.6  C) (Oral) 5' 11\" (1.803 m) 97% 29.04 kg/m2        Blood Pressure from Last 3 Encounters:   04/29/17 100/70   04/18/17 108/82   04/03/17 126/84    Weight from Last 3 Encounters:   04/29/17 208 lb 3.2 oz (94.4 kg)   04/18/17 202 lb 12.8 oz (92 kg)   04/03/17 206 lb 8 oz (93.7 kg)              Today, you had the following     No orders found for display         Today's Medication Changes          These changes are accurate as of: 4/29/17 12:42 PM.  If you have any questions, ask your nurse or doctor.               Start taking these medicines.        Dose/Directions    ciprofloxacin 500 MG tablet   Commonly known as:  CIPRO   Used for:  Diverticulitis of colon, LLQ abdominal pain   Started by:  Joyce Atkinson MD        Dose:  500 mg   Take 1 tablet (500 mg) by mouth 2 times daily   Quantity:  20 tablet   Refills:  0       metroNIDAZOLE 500 MG tablet   Commonly known as:  FLAGYL   Used for:  Diverticulitis of colon   Started by:  Joyce Atkinson MD        Dose:  500 mg   Take 1 tablet (500 mg) by mouth 3 times daily   Quantity:  30 tablet   Refills:  0            Where to get your medicines      These medications were sent to [facility]/pharmacy #[id number] [place], [place] - [address] [address]     Phone:  [phone]     ciprofloxacin 500 MG tablet    metroNIDAZOLE 500 MG tablet                Primary Care Provider Office Phone # Fax #    William Green [phone]       [facility] [address] [facility] [address]        Thank you!     Thank you for choosing [facility]  for your " care. Our goal is always to provide you with excellent care. Hearing back from our patients is one way we can continue to improve our services. Please take a few minutes to complete the written survey that you may receive in the mail after your visit with us. Thank you!             Your Updated Medication List - Protect others around you: Learn how to safely use, store and throw away your medicines at www.disposemymeds.org.          This list is accurate as of: 4/29/17 12:42 PM.  Always use your most recent med list.                   Brand Name Dispense Instructions for use    cetirizine 10 MG tablet    zyrTEC    90 tablet    Take 10 mg by mouth daily as needed       ciprofloxacin 500 MG tablet    CIPRO    20 tablet    Take 1 tablet (500 mg) by mouth 2 times daily       ibuprofen 600 MG tablet    ADVIL/MOTRIN    30 tablet    Take 1 tablet (600 mg) by mouth every 6 hours as needed for moderate pain       levalbuterol 45 MCG/ACT Inhaler    XOPENEX HFA    1 Inhaler    Inhale 1-2 puffs into the lungs every 6 hours as needed for shortness of breath / dyspnea       meclizine 25 MG tablet    ANTIVERT    60    1-2 TABLETS  EVERY 6 HOURS AS NEED FOR  DIZZNESS       metroNIDAZOLE 500 MG tablet    FLAGYL    30 tablet    Take 1 tablet (500 mg) by mouth 3 times daily       * mometasone 110 MCG/INH inhaler    ASMANEX 30 METERED DOSES    1 Inhaler    Inhale 1 puff into the lungs daily       * mometasone 110 MCG/INH inhaler    ASMANEX 30 METERED DOSES    1 Inhaler    Inhale 1 puff into the lungs daily       mometasone 50 MCG/ACT spray    NASONEX    17 g    Spray 2 sprays into both nostrils daily       OMEPRAZOLE PO      Take by mouth every morning       * Notice:  This list has 2 medication(s) that are the same as other medications prescribed for you. Read the directions carefully, and ask your doctor or other care provider to review them with you.

## 2017-04-29 NOTE — PROGRESS NOTES
SUBJECTIVE  HPI:  Enrico Pedroza is a 46 year old male who presents with the CC of abdominal/pelvic pain.    Pain is located in the LLQ area, with radiation to None.  The pain is characterized as dull and pressure.    Pain has been present for over 2 week(s) and did not really improve with augmentin, completed yesterday.  EXACERBATING FACTORS: nothing  RELIEVING FACTORS: nothing. Is eating low fiber diet.  ASSOCIATED SX: has had nausea but no vomiting. No constipation.    Past Medical History:   Diagnosis Date     Allergic rhinitis, cause unspecified      Intermittent asthma     exercise/allergies/molds     Nephrolithiasis      Current Outpatient Prescriptions   Medication Sig Dispense Refill     metroNIDAZOLE (FLAGYL) 500 MG tablet Take 1 tablet (500 mg) by mouth 3 times daily 30 tablet 0     ciprofloxacin (CIPRO) 500 MG tablet Take 1 tablet (500 mg) by mouth 2 times daily 20 tablet 0     levalbuterol (XOPENEX HFA) 45 MCG/ACT Inhaler Inhale 1-2 puffs into the lungs every 6 hours as needed for shortness of breath / dyspnea 1 Inhaler 11     mometasone (ASMANEX 30 METERED DOSES) 110 MCG/INH inhaler Inhale 1 puff into the lungs daily 1 Inhaler 11     ibuprofen (ADVIL,MOTRIN) 600 MG tablet Take 1 tablet (600 mg) by mouth every 6 hours as needed for moderate pain 30 tablet 0     mometasone (ASMANEX 30 METERED DOSES) 110 MCG/INH inhaler Inhale 1 puff into the lungs daily 1 Inhaler 11     MECLIZINE HCL 25 MG OR TABS 1-2 TABLETS  EVERY 6 HOURS AS NEED FOR  DIZZNESS 60 3     OMEPRAZOLE PO Take by mouth every morning       mometasone (NASONEX) 50 MCG/ACT nasal spray Spray 2 sprays into both nostrils daily 17 g 11     cetirizine (ZYRTEC) 10 MG tablet Take 10 mg by mouth daily as needed  90 tablet 3     Social History   Substance Use Topics     Smoking status: Former Smoker     Quit date: 1/1/1995     Smokeless tobacco: Never Used     Alcohol use 0.5 - 1.0 oz/week     1 - 2 drink(s) per week      Comment: once weekly  "    ROS:  Review of systems negative except as stated above.    OBJECTIVE:  /70  Pulse 79  Temp 97.8  F (36.6  C) (Oral)  Ht 5' 11\" (1.803 m)  Wt 208 lb 3.2 oz (94.4 kg)  SpO2 97%  BMI 29.04 kg/m2  GENERAL APPEARANCE: healthy, alert and no distress  ABDOMEN:  soft, no HSM or masses and bowel sounds normal, LLQ tender to palpation, no rebound    ASSESSMENT/PLAN:    1. LLQ Pain/Diverticulitis of colon  - metroNIDAZOLE (FLAGYL) 500 MG tablet; Take 1 tablet (500 mg) by mouth 3 times daily   - ciprofloxacin (CIPRO) 500 MG tablet; Take 1 tablet (500 mg) by mouth 2 times daily   - follow up PCP in 1 week, to ED if worsens    See Orders in Epic  "

## 2017-05-22 ENCOUNTER — TELEPHONE (OUTPATIENT)
Dept: INTERNAL MEDICINE | Facility: CLINIC | Age: 47
End: 2017-05-22

## 2017-05-22 DIAGNOSIS — J45.40 MODERATE PERSISTENT ASTHMA WITHOUT COMPLICATION: ICD-10-CM

## 2017-05-22 NOTE — TELEPHONE ENCOUNTER
Prior Authorization Request    1. Prior Authorization for the medication Amanex Twisthaler         Requesting Provider: William Green          Pt name: Enrico Pedroza        Pt : 1970        Pt MRN: [id number]        Last Office Visit: 2017           Insurance: Payor: [employer] / Plan: [id number] / Product Type: PPO /         Insurance ID Number: [id number]        Prior Auth Contact Phone number:            To be completed by provider:     2.   Refuse or Start Prior Auth:  Do not start Prior Auth.  Medication change required.  Will send in alternative

## 2017-06-21 ENCOUNTER — OFFICE VISIT (OUTPATIENT)
Dept: SLEEP MEDICINE | Facility: CLINIC | Age: 47
End: 2017-06-21
Payer: COMMERCIAL

## 2017-06-21 VITALS
HEIGHT: 71 IN | OXYGEN SATURATION: 98 % | TEMPERATURE: 98.3 F | SYSTOLIC BLOOD PRESSURE: 116 MMHG | HEART RATE: 77 BPM | BODY MASS INDEX: 28.98 KG/M2 | WEIGHT: 207 LBS | DIASTOLIC BLOOD PRESSURE: 81 MMHG

## 2017-06-21 DIAGNOSIS — G47.33 OSA (OBSTRUCTIVE SLEEP APNEA): Primary | ICD-10-CM

## 2017-06-21 DIAGNOSIS — R53.82 CHRONIC FATIGUE: ICD-10-CM

## 2017-06-21 PROCEDURE — 99204 OFFICE O/P NEW MOD 45 MIN: CPT | Performed by: PHYSICIAN ASSISTANT

## 2017-06-21 NOTE — MR AVS SNAPSHOT
"              After Visit Summary   6/21/2017    Enrico Pedroza    MRN: [id number]           Patient Information     Date Of Birth          1970        Visit Information        Provider Department      6/21/2017 9:00 AM Goltz, Bennett Ezra, PA-C [facility]        Today's Diagnoses     NURIA (obstructive sleep apnea)    -  1    Chronic fatigue          Care Instructions    MY TREATMENT INFORMATION FOR SLEEP APNEA-  Enrico Pedroza    DOCTOR : Bennett Ezra Goltz, PA-C  SLEEP CENTER : [facility]     MY CONTACT NUMBER: [phone]    Am I having a sleep study at a sleep center?  Make sure you have an appointment for the study before you leave!    Am I having a home sleep study?  Watch this video:  [email or web address]?[email or web address]    Frequently asked questions:  1. What is Obstructive Sleep Apnea (NURIA)? NURIA is the most common type of sleep apnea. Apnea means, \"without breath.\"  Apnea is most often caused by narrowing or collapse of the upper airway as muscles relax during sleep.   Almost everyone has occasional apneas. Most people with sleep apnea have had brief interruptions at night frequently for many years.  The severity of sleep apnea is related to how frequent and severe the events are.   2. What are the consequences of NURIA? Symptoms include: feeling sleepy during the day, snoring loudly, gasping or stopping of breathing, trouble sleeping, and occasionally morning headaches or heartburn at night.  Sleepiness can be serious and even increase the risk of falling asleep while driving. Other health consequences may include development of high blood pressure and other cardiovascular disease in persons who are susceptible. Untreated NURIA can contribute to heart disease, stroke and diabetes.   3. What are the treatment options? In most situations, sleep apnea is a lifelong disease that must be managed with daily therapy. Medications are not effective " for sleep apnea and surgery is generally not considered until other therapies have been tried. Your treatment is your choice . Continuous Positive Airway (CPAP) works right away and is the therapy that is effective in nearly everyone. An oral device to hold your jaw forward is usually the next most reliable option. Other options include postioning devices (to keep you off your back), weight loss, and surgery including a tongue pacing device. There is more detail about some of these options below.    Important tips for using CPAP and similar devices   Know your equipment:  CPAP is continuous positive airway pressure that prevents obstructive sleep apnea by keeping the throat from collapsing while you are sleeping. In most cases, the device is  smart  and can slowly self-adjusts if your throat collapses and keeps a record every day of how well you are treated-this information is available to you and your care team.  BPAP is bilevel positive airway pressure that keeps your throat open and also assists each breath with a pressure boost to maintain adequate breathing.  Special kinds of BPAP are used in patients who have inadequate breathing from lung or heart disease. In most cases, the device is  smart  and can slowly self-adjusts to assist breathing. Like CPAP, the device keeps a record of how well you are treated.  Your mask is your connection to the device. You get to choose what feels most comfortable and the staff will help to make sure if fits. Here: are some examples of the different masks that are available:       Key points to remember on your journey with sleep apnea:  1. Sleep study.  PAP devices often need to be adjusted during a sleep study to show that they are effective and adjusted right.  2. Good tips to remember: Try wearing just the mask during a quiet time during the day so your body adapts to wearing it. A humidifier is recommended for comfort in most cases to prevent drying of your nose and throat.  Allergy medication from your provider may help you if you are having nasal congestion.  3. Getting settled-in. It takes more than one night for most of us to get used to wearing a mask. Try wearing just the mask during a quiet time during the day so your body adapts to wearing it. A humidifier is recommended for comfort in most cases. Our team will work with you carefully on the first day and will be in contact within 4 days and again at 2 and 4 weeks for advice and remote device adjustments. Your therapy is evaluated by the device each day.   4. Use it every night. The more you are able to sleep naturally for 7-8 hours, the more likely you will have good sleep and to prevent health risks or symptoms from sleep apnea. Even if you use it 4 hours it helps. Occasionally all of us are unable to use a medical therapy, in sleep apnea, it is not dangerous to miss one night.   5. Communicate. Call our skilled team on the number provided on the first day if your visit for problems that make it difficult to wear the device. Over 2 out of 3 patients can learn to wear the device long-term with help from our team. Remember to call our team or your sleep providers if you are unable to wear the device as we may have other solutions for those who cannot adapt to mask CPAP therapy. It is recommended that you sleep your sleep provider within the first 3 months and yearly after that if you are not having problems.   Take care of your equipment. Make sure you clean your mask and tubing using directions every day and that your filter and mask are replaced as recommended or if they are not working.     BESIDES CPAP, WHAT OTHER THERAPIES ARE THERE?    Positioning Device  Positioning devices are generally used when sleep apnea is mild and only occurs on your back.This example shows a pillow that straps around the waist. It may be appropriate for those whose sleep study shows milder sleep apnea that occurs primarily when lying flat on one's  back. Preliminary studies have shown benefit but effectiveness at home may need to be verified by a home sleep test. These devices are generally not covered by medical insurance.    Oral Appliance  What is oral appliance therapy?  An oral appliance device fits on your teeth at night like a retainer used after having braces. The device is made by a specialized dentist and requires several visits over 1-2 months before a manufactured device is made to fit your teeth and is adjusted to prevent your sleep apnea. Once an oral device is working properly, snoring should be improved. A home sleep test may be recommended at that time if to determine whether the sleep apnea is adequately treated.       Some things to remember:  -Oral devices are often, but not always, covered by your medical insurance. Be sure to check with your insurance provider.   -If you are referred for oral therapy, you will be given a list of specialized dentists to consider or you may choose to visit the Web site of the American Academy of Dental Sleep Medicine  -Oral devices are less likely to work if you have severe sleep apnea or are extremely overweight.     More detailed information  An oral appliance is a small acrylic device that fits over the upper and lower teeth  (similar to a retainer or a mouth guard). This device slightly moves jaw forward, which moves the base of the tongue forward, opens the airway, improves breathing for effective treat snoring and obstructive sleep apnea in perhaps 7 out of 10 people .  The best working devices are custom-made by a dental device  after a mold is made of the teeth 1, 2, 3.  When is an oral appliance indicated?  Oral appliance therapy is recommended as a first-line treatment for patients with primary snoring, mild sleep apnea, and for patients with moderate sleep apnea who prefer appliance therapy to use of CPAP4, 5. Severity of sleep apnea is determined by sleep testing and is based on the  number of respiratory events per hour of sleep.   How successful is oral appliance therapy?  The success rate of oral appliance therapy in patients with mild sleep apnea is 75-80% while in patients with moderate sleep apnea it is 50-70%. The chance of success in patients with severe sleep apnea is 40-50%. The research also shows that oral appliances have a beneficial effect on the cardiovascular health of NURIA patients at the same magnitude as CPAP therapy7.  Oral appliances should be a second-line treatment in cases of severe sleep apnea, but if not completely successful then a combination therapy utilizing CPAP plus oral appliance therapy may be effective. Oral appliances tend to be effective in a broad range of patients although studies show that the patients who have the highest success are females, younger patients, those with milder disease, and less severe obesity. 3, 6.   Finding a dentist that practices dental sleep medicine  Specific training is available through the American Academy of Dental Sleep Medicine for dentists interested in working in the field of sleep. To find a dentist who is educated in the field of sleep and the use of oral appliances, near you, visit the Web site of the American Academy of Dental Sleep Medicine.    References  1. Celia et al. Objectively measured vs self-reported compliance during oral appliance therapy for sleep-disordered breathing. Chest 2013; 144(5): 6842-9544.  2. Princess et al. Objective measurement of compliance during oral appliance therapy for sleep-disordered breathing. Thorax 2013; 68(1): 91-96.  3. Josh, et al. Mandibular advancement devices in 620 men and women with NURIA and snoring: tolerability and predictors of treatment success. Chest 2004; 125: 6690-2971.  4. Tr, et al. Oral appliances for snoring and NURIA: a review. Sleep 2006; 29: 244-262.  5. Giuseppe et al. Oral appliance treatment for NURIA: an update. J Clin Sleep Med 2014; 10(2):  215-227.  6. cortney Garcia al. Predictors of OSAH treatment outcome. J Dent Res 2007; 86: 0061-7045.  Weight Loss:    Weight loss is a long-term strategy that may improve sleep apnea in some patients.    Weight management is a personal decision.  If you are interested in exploring weight loss strategies, the following discussion covers the impact on weight loss on sleep apnea and the approaches that may be successful.    Weight loss decreases severity of sleep apnea in most people with obesity. For those with mild obesity who have developed snoring with weight gain, even 15-30 pound weight loss can improve and occasionally eliminate sleep apnea.  Structured and life-long dietary and health habits are necessary to lose weight and keep healthier weight levels.     Though there may be significant health benefits from weight loss, long-term weight loss is very difficult to achieve- studies show success with dietary management in less than 10% of people. In addition, substantial weight loss may require years of dietary control and may be difficult if patients have severe obesity. In these cases, surgical management may be considered.  Finally, older individuals who have tolerated obesity without health complications may be less likely to benefit from weight loss strategies.        Your BMI is Body mass index is 28.87 kg/(m^2).  Weight management is a personal decision.  If you are interested in exploring weight loss strategies, the following discussion covers the approaches that may be successful. Body mass index (BMI) is one way to tell whether you are at a healthy weight, overweight, or obese. It measures your weight in relation to your height.  A BMI of 18.5 to 24.9 is in the healthy range. A person with a BMI of 25 to 29.9 is considered overweight, and someone with a BMI of 30 or greater is considered obese. More than two-thirds of American adults are considered overweight or obese.  Being overweight or obese increases  the risk for further weight gain. Excess weight may lead to heart disease and diabetes.  Creating and following plans for healthy eating and physical activity may help you improve your health.  Weight control is part of healthy lifestyle and includes exercise, emotional health, and healthy eating habits. Careful eating habits lifelong are the mainstay of weight control. Though there are significant health benefits from weight loss, long-term weight loss with diet alone may be very difficult to achieve- studies show long-term success with dietary management in less than 10% of people. Attaining a healthy weight may be especially difficult to achieve in those with severe obesity. In some cases, medications, devices and surgical management might be considered.  What can you do?  If you are overweight or obese and are interested in methods for weight loss, you should discuss this with your provider.     Consider reducing daily calorie intake by 500 calories.     Keep a food journal.     Avoiding skipping meals, consider cutting portions instead.    Diet combined with exercise helps maintain muscle while optimizing fat loss. Strength training is particularly important for building and maintaining muscle mass. Exercise helps reduce stress, increase energy, and improves fitness. Increasing exercise without diet control, however, may not burn enough calories to loose weight.       Start walking three days a week 10-20 minutes at a time    Work towards walking thirty minutes five days a week     Eventually, increase the speed of your walking for 1-2 minutes at time    In addition, we recommend that you review healthy lifestyles and methods for weight loss available through the National Institutes of Health patient information sites:  http://win.niddk.nih.gov/publications/index.htm    And look into health and wellness programs that may be available through your health insurance provider, employer, local community center, or  [facility].    Weight management plan: Patient was referred to their PCP to discuss a diet and exercise plan.    Surgery:    Surgery for obstructive sleep apnea is considered generally only when other therapies fail to work. Surgery may be discussed with you if you are having a difficult time tolerating CPAP and or when there is an abnormal structure that requires surgical correction.  Nose and throat surgeries often enlarge the airway to prevent collapse.  Most of these surgeries create pain for 1-2 weeks and up to half of the most common surgeries are not effective throughout life.  You should carefully discuss the benefits and drawbacks to surgery with your sleep provider and surgeon to determine if it is the best solution for you.   More information  Surgery for NURIA is directed at areas that are responsible for narrowing or complete obstruction of the airway during sleep.  There are a wide range of procedures available to enlarge and/or stabilize the airway to prevent blockage of breathing in the three major areas where it can occur: the palate, tongue, and nasal regions.  Successful surgical treatment depends on the accurate identification of the factors responsible for obstructive sleep apnea in each person.  A personalized approach is required because there is no single treatment that works well for everyone.  Because of anatomic variation, consultation with an examination by a sleep surgeon is a critical first step in determining what surgical options are best for each patient.  In some cases, examination during sedation may be recommended in order to guide the selection of procedures.  Patients will be counseled about risks and benefits as well as the typical recovery course after surgery. Surgery is typically not a cure for a person s NURIA.  However, surgery will often significantly improve one s NURIA severity (termed  success rate ).  Even in the absence of a cure, surgery will decrease the cardiovascular  risk associated with OSA7; improve overall quality of life8 (sleepiness, functionality, sleep quality, etc).  Palate Procedures:  Patients with NURIA often have narrowing of their airway in the region of their tonsils and uvula.  The goals of palate procedures are to widen the airway in this region as well as to help the tissues resist collapse.  Modern palate procedure techniques focus on tissue conservation and soft tissue rearrangement, rather than tissue removal.  Often the uvula is preserved in this procedure. Residual sleep apnea is common in patient after pharyngoplasty with an average reduction in sleep apnea events of 33%2.    Tongue Procedures:  ExamWhile patients are awake, the muscles that surround the throat are active and keep this region open for breathing. These muscles relax during sleep, allowing the tongue and other structures to collapse and block breathing.  There are several different tongue procedures available.  Selection of a tongue base procedure depends on characteristics seen on physical exam.  Generally, procedures are aimed at removing bulky tissues in this area or preventing the back of the tongue from falling back during sleep.  Success rates for tongue surgery range from 50-62%3.  Hypoglossal Nerve Stimulation:  Hypoglossal nerve stimulation has recently received approval from the [place] Food and Drug Administration for the treatment of obstructive sleep apnea.  This is based on research showing that the system was safe and effective in treating sleep apnea6.  Results showed that the median AHI score decreased 68%, from 29.3 to 9.0. This therapy uses an implant system that senses breathing patterns and delivers mild stimulation to airway muscles, which keeps the airway open during sleep.  The system consists of three fully implanted components: a small generator (similar in size to a pacemaker), a breathing sensor, and a stimulation lead.  Using a small handheld remote, a  patient turns the therapy on before bed and off upon awakening.    Candidates for this device must be greater than 22 years of age, have moderate to severe NURIA (AHI between 20-65), BMI less than 32, have tried CPAP/oral appliance without success, and have appropriate upper airway anatomy (determined by a sleep endoscopy performed by Dr. Angel).    Hypoglossal Nerve Stimulation Pathway:    The sleep surgeon s office will work with the patient through the insurance prior-authorization process (including communications and appeals).    Nasal Procedures:  Nasal obstruction can interfere with nasal breathing during the day and night.  Studies have shown that relief of nasal obstruction can improve the ability of some patients to tolerate positive airway pressure therapy for obstructive sleep apnea1.  Treatment options include medications such as nasal saline, topical corticosteroid and antihistamine sprays, and oral medications such as antihistamines or decongestants. Non-surgical treatments can include external nasal dilators for selected patients. If these are not successful by themselves, surgery can improve the nasal airway either alone or in combination with these other options.  Combination Procedures:  Combination of surgical procedures and other treatments may be recommended, particularly if patients have more than one area of narrowing or persistent positional disease.  The success rate of combination surgery ranges from 66-80%2,3.    References  1. [place] DIAMOND. The Role of the Nose in Snoring and Obstructive Sleep Apnoea: An Update.  Eur Arch Otorhinolaryngol. 2011; 268: 1365-73.  2.  Alee SM; Mc JA; Bianca JR; Pallanch JF; Marina MB; Emily SG; Orlando PINA. Surgical modifications of the upper airway for obstructive sleep apnea in adults: a systematic review and meta-analysis. SLEEP 2010;33(10):5293-2041. Vero VALDEZ. Hypopharyngeal surgery in obstructive sleep apnea: an evidence-based medicine review.  Arch  Otolaryngol Head Neck Surg. 2006 Feb;132(2):206-13.  3. Jacobo YH1, Margarette Y, Raudel SALMA. The efficacy of anatomically based multilevel surgery for obstructive sleep apnea. Otolaryngol Head Neck Surg. 2003 Oct;129(4):327-35.  4. Kezirian E, Goldberg A. Hypopharyngeal Surgery in Obstructive Sleep Apnea: An Evidence-Based Medicine Review. Arch Otolaryngol Head Neck Surg. 2006 Feb;132(2):206-13.  5. Miri CALDERON et al. Upper-Airway Stimulation for Obstructive Sleep Apnea.  N Engl J Med. 2014 Jan 9;370(2):139-49.  6. Jack Y et al. Increased Incidence of Cardiovascular Disease in Middle-aged Men with Obstructive Sleep Apnea. Am J Respir Crit Care Med; 2002 166: 159-165  7. Sheri AMADO et al. Studying Life Effects and Effectiveness of Palatopharyngoplasty (SLEEP) study: Subjective Outcomes of Isolated Uvulopalatopharyngoplasty. Otolaryngol Head Neck Surg. 2011; 144: 623-631.    General recommendations for sleep problems (Insomnia)  Allow 2-4 weeks to see results     Establish a regular sleep schedule    Most people only need 7-8 hours of sleep.  Don't be in bed longer than you need     to sleep or you will end up spending more time awake in bed. This trains your    brain to think of the bed as a place to not sleep.  Go to bed at same time each night   Get up at same time each day - Set an alarm everyday (even weekends). This is one of    the most important tips. It prevents you from relying on your insomnia to get you    up on time for your day. That actually reinforces insomnia. It also will help your    body get into a pattern where you start feeling tired at a consistent time each    night.  The body functions best when you keep a consistent routine.  Avoid sleeping-in and napping. Anytime you sleep during the day, you will be less tired at    night. You may be tired enough to fall asleep, but you will wake more in the    middle of the night because you will have met your sleep need before the night is    done.   Cut down time  in bed (if not asleep, get up)- Use your bed only for sleep and sex    Anytime you spend time in bed doing activities other than sleep (reading,    watching TV, working, playing on the computer or phone, or even just laying in    bed trying to sleep), you are training  your brain to think of the bed as a place to    do activities other than sleep. If you are not falling asleep within 20-30 minutes,    get out of bed. While out of bed, avoid bright lights. Avoid work or chores. Being    productive in the middle of the night reinforces waking up at night. Find relaxing,    not particularly entertaining activities like reading, listening to music, or relaxation    exercises. Go back to bed if you start feeling groggy, or after about 30 minutes,    even if not feeling very tired. Sometimes, just getting out of bed stops the pattern    of getting frustrated about laying in bed not sleeping, and that can help you fall    asleep.   Avoid trying to force yourself to sleep- sleep is not like everything else. The harder you    work at most things, the more you can accomplish. The harder you work at    sleep, the less you will sleep.     Make the bedroom comfortable - quiet, dark and cool are better. Consider ear plugs    (silicon). Use dark blinds or wear an eye mask if needed     Make a relaxing routine prior to bedtime  Relaxation exercises:   Progressive muscle relaxation: Relax each muscle group individually    Begin with your feet, flex, then relax. Try to imagine your feet feeling heavy and sinking into the bed. Move to your calves, do the same thing. Work through each muscle group toward your head.    Relaxing Mental Imagery: Try to imagine a trip that you took and found relaxing, or imagine a day at the beach. Try to walk yourself through the day in your mind as if you were dreaming it. Try to imagine sensing the different experiences, such as feeling sand between your toes, the heat of the sun on your skin, seeing  the waves crashing the shore, the smell of the salt water, etc.     Deal with your worries before bedtime    Set aside a worry time around dinner time for 10-15 minutes. Write down the    things that are on your mind. Plan time in the coming days to address those    issues. Brainstorm ideas on how you will deal with them. Try to identify issues    that are out of your control, and try to let those issues go.  Listen to relaxation tapes   Classical Music or Nature sounds   Back Massage   Get regular exercise each day (at least 1-2 hours before bedtime)   Take medications only as directed   Eat a light bedtime snack or warm drink   Warm milk   Warm herbal tea (non-caffeinated)       Things to avoid   No overstimulating activities just before bed   No competitive games before bedtime   No exciting television programs before bedtime   Avoid caffeine after lunchtime   Avoid chocolate   Do not use alcohol to induce sleep (worsens Insomnia)   Do not take someone else's sleeping pills   Do not look at the clock when awakening   Do not turn on light when getting up to use bathroom, use a nightlight     Online Programs     www.[employer] (pronounced shut eye). There is a fee for this program. Enter the code  [place]  if you decide to enroll in this program.      www.sleepIO.com (pronounced sleep ee oh). There is a fee for this program. Enter the code  [place]  if you decide to enroll in this program.     Suggested Resources  Insomnia Treatment Books     Overcoming Insomnia by Roldan Gutiérrez and Bernie Mc (2008)    No More Sleepless Nights by Antoine Santana and Jazmin iRzzo (1996)    Say Bart to Insomnia by Harley Pringle (2009)    The Insomnia Workbook by Gail Tsang and Santana Escobar (2009)    The Insomnia Answer by Eleazar Granda and Ryan Campbell (2006)      Stress Management and Relaxation Books    The Relaxation and Stress Reduction Workbook by Pau Perez, Helene Nash and William  Raad (2008)    Stress Management Workbook: Techniques and Self-Assessment Procedures by Candice Alex and Ganesh Marr (1997)    A Mindfulness-Based Stress Reduction Workbook by Venancio Gilliland and Ivory Rios (2010)    The Complete Stress Management Workbook by Angel Rascon, Boston Montanez and Fab Do (1996)    Assert Yourself by Kristina Talamantes and Junior Talamantes (1977)    Relaxation Resources for Computer Download   These websites offer resources to help you relax. This list is for information only. [facility] is not responsible for the quality of services or the actions of any person or organization.  Progressive Muscle Relaxation (PMR):     http://www.WordSentry/progressive-muscle-relaxation-exercise.html     http://studentsupport.[facility]/counseling/resources/self-help/relaxation-and-stress-management/   Deep Breathing Exercises:    http://www.WordSentry/breathing-awareness.html     Meditation:     wwwMuse & Co    www.ThisLifemeditationVineloopsite.Logicworks You may have to pay for some of these resources.    Guided Imagery:    http://www.WordSentry/guided-imagery-scripts.html     http://[employer]/library/rnzmxurbqo-nuofbo-wxtsnhg/     Counseling / Behavioral Health  [facility]  Visit www.[place].org or call [phone] to find a clinic close to you.  Or call [phone] for [facility].                      Follow-ups after your visit        Additional Services     SLEEP DENTAL REFERRAL       Dental appliance resources recommended by [facility]     Below is a list of dental appliance resources recommended by [facility]   If you wish to choose your own dental sleep dentist, you may identify a provider close to you: http://www.aadsm.org/[email or web address]    [facility]   Sleep Medicine [facility]   César Pacheco DDS, MS Luque  Professional Building  [address]  [place] [address]   Appointments: [phone]  Fax: [phone]   Email: [email or web address]   Dental and Oral Surgery Clinic   Mynor Garcia, DANNY Wright DDS   [address]   [place] [address]   Appointments: [phone]   Website: [facility].org/clinics/oms/[facility]_CLINICS_193    Snoring and Sleep Apnea   Dental Treatment Center   ISAIAH Flores, ISAIAH  [address]   [place] [address]   Appointments: [phone]   Fax: [phone]   Email: [email or web address]  Website: [employer]     [facility]   Office 1   Elmo Genao DDS - [DME Medicare]  [address]   [place] [address]  Appointments: [phone]  Fax: [phone]  Website: [employer] Game[employer]    [facility]   Office 2  Elmo Genao DDS - [DME Medicare]  [address]  [place] [address]  Appointments: [phone]  Fax: [phone]  Website: [employer]    [facility]  Kenny Baugh DDS  [address] [place] [address]  Appointments: [phone]    [facility]   [place] [facility]   Oswaldo Wright DDS   [employer]   [address]   [place] [address]   Appointments: [phone]   Fax: [phone]   Website: TastyKhana     [facility]   [place] Office   Micky Horowitz DDS, MS - [DME Medicare]  [facility]   [address]   [place] [address]   Appointments: [phone]   Fax: [phone]   Website: TastyKhana     [facility]  Michael Dutta DMD, MSD - [DME Medicare]  [address]  [place] [address]  Appointments [phone]  Fax: [phone]  Website: Appear Here    [facility]  [facility]   Ely Wong DDS, PhD, MS   [facility]   [address]   [place] [address]   Appointments: [phone]   Fax: [phone]   Website: [employer]     [facility]   [place] Office   Ely Wong DDS, PhD, MS   [facility]   [address]   [place] [address]   Appointments: [phone]   Fax: [phone]   Website: Absolute [employer]     [facility] [place] [facility]  Sydney High DDS  [facility]  [address] [place] [address]  Appointments: [phone]   Fax: [phone]    If you wish to choose your own dental sleep dentist, you may identify a provider close to you: [email or web address]      AHI: 7.7/hr PSG DATE: 2/5/2014     Return to clinic in 3 months for Home Sleep Test to confirm adequacy of Treatment.    Other information regarding this referral: Mandibular repositioning appliance for NURIA. If your insurance asks for a CPT code, it is .    Please be aware that coverage of these services is subject to the terms and limitations of your health insurance plan.  Call member services at your health plan with any benefit or coverage questions.      Please bring the following to your appointment:    >>   List of current medications   >>   This referral request   >>   Any documents/labs given to you for this referral                  Follow-up notes from your care team     Return in about 3 months (around 9/21/2017).      Future tests that were ordered for you today     Open Future Orders        Priority Expected Expires Ordered    Vitamin D Deficiency Routine  8/20/2017 6/21/2017            Who to contact     If you have questions or need follow up information about today's clinic visit or your schedule please contact [facility] directly at [phone].  Normal or non-critical lab and  "imaging results will be communicated to you by MyChart, letter or phone within 4 business days after the clinic has received the results. If you do not hear from us within 7 days, please contact the clinic through ActualMeds or phone. If you have a critical or abnormal lab result, we will notify you by phone as soon as possible.  Submit refill requests through ActualMeds or call your pharmacy and they will forward the refill request to us. Please allow 3 business days for your refill to be completed.          Additional Information About Your Visit        Great [employer] MeetingsharCocrystal Discovery Information     ActualMeds gives you secure access to your electronic health record. If you see a primary care provider, you can also send messages to your care team and make appointments. If you have questions, please call your primary care clinic.  If you do not have a primary care provider, please call [phone] and they will assist you.        Care EveryWhere ID     This is your Care EveryWhere ID. This could be used by other organizations to access your [facility] medical records  [id number]        Your Vitals Were     Pulse Temperature Height Pulse Oximetry BMI (Body Mass Index)       77 98.3  F (36.8  C) (Oral) 1.803 m (5' 11\") 98% 28.87 kg/m2        Blood Pressure from Last 3 Encounters:   06/21/17 116/81   04/29/17 100/70   04/18/17 108/82    Weight from Last 3 Encounters:   06/21/17 93.9 kg (207 lb)   04/29/17 94.4 kg (208 lb 3.2 oz)   04/18/17 92 kg (202 lb 12.8 oz)              We Performed the Following     SLEEP DENTAL REFERRAL          Today's Medication Changes          These changes are accurate as of: 6/21/17 10:13 AM.  If you have any questions, ask your nurse or doctor.               Stop taking these medicines if you haven't already. Please contact your care team if you have questions.     ciprofloxacin 500 MG tablet   Commonly known as:  CIPRO   Stopped by:  Goltz, Bennett Ezra, PA-C           metroNIDAZOLE 500 MG tablet   Commonly known as: "  FLAGYL   Stopped by:  Goltz, Bennett Ezra, PA-C                    Primary Care Provider Office Phone # Fax #    William Green [phone]       [facility] [address] [address]        Equal Access to Services     MARIE VERGARA : Hadii aad ku hadasho Soomaali, waaxda luqadaha, qaybta kaalmada adeegyada, waxay antwanin hayaan adeafua lizshirleneyolie cooney. [facility] [phone].    ATENCIÓN: Si habla español, tiene a aguilar disposición servicios gratuitos de asistencia lingüística. [facility] [phone].    We comply with applicable federal civil rights laws and [place] laws. We do not discriminate on the basis of race, color, national origin, age, disability sex, sexual orientation or gender identity.            Thank you!     Thank you for choosing [facility]  for your care. Our goal is always to provide you with excellent care. Hearing back from our patients is one way we can continue to improve our services. Please take a few minutes to complete the written survey that you may receive in the mail after your visit with us. Thank you!             Your Updated Medication List - Protect others around you: Learn how to safely use, store and throw away your medicines at www.disposemymeds.org.          This list is accurate as of: 6/21/17 10:13 AM.  Always use your most recent med list.                   Brand Name Dispense Instructions for use Diagnosis    beclomethasone 80 MCG/ACT Inhaler    QVAR    1 Inhaler    Inhale 2 puffs into the lungs 2 times daily    Moderate persistent asthma without complication       cetirizine 10 MG tablet    zyrTEC    90 tablet    Take 10 mg by mouth daily as needed        ibuprofen 600 MG tablet    ADVIL/MOTRIN    30 tablet    Take 1 tablet (600 mg) by mouth every 6 hours as needed for moderate pain        levalbuterol 45 MCG/ACT Inhaler    XOPENEX HFA    1 Inhaler    Inhale 1-2 puffs into the lungs every 6 hours as needed for  shortness of breath / dyspnea    Moderate persistent asthma without complication       meclizine 25 MG tablet    ANTIVERT    60    1-2 TABLETS  EVERY 6 HOURS AS NEED FOR  DIZZNESS    Labyrinthitis, unspecified       mometasone 110 MCG/INH inhaler    ASMANEX 30 METERED DOSES    1 Inhaler    Inhale 1 puff into the lungs daily    Moderate persistent asthma without complication       mometasone 50 MCG/ACT spray    NASONEX    17 g    Spray 2 sprays into both nostrils daily    Allergic rhinitis due to pollen       OMEPRAZOLE PO      Take by mouth every morning

## 2017-06-21 NOTE — NURSING NOTE
"Chief Complaint   Patient presents with     Sleep Problem     Discuss cpap machine       Initial /81  Pulse 77  Temp 98.3  F (36.8  C) (Oral)  Ht 1.803 m (5' 11\")  Wt 93.9 kg (207 lb)  SpO2 98%  BMI 28.87 kg/m2 Estimated body mass index is 28.87 kg/(m^2) as calculated from the following:    Height as of this encounter: 1.803 m (5' 11\").    Weight as of this encounter: 93.9 kg (207 lb).  Medication Reconciliation: complete   Neck 41cm  ESS 3/24  Christi Prasad MA      "

## 2017-06-21 NOTE — PATIENT INSTRUCTIONS
"MY TREATMENT INFORMATION FOR SLEEP APNEA-  Enrico Pedroza    DOCTOR : Bennett Ezra Goltz, PA-C  SLEEP CENTER : [facility]     MY CONTACT NUMBER: [phone]    Am I having a sleep study at a sleep center?  Make sure you have an appointment for the study before you leave!    Am I having a home sleep study?  Watch this video:  https://www.VtagO.com/watch?v=CteI_[email or web address]&[email or web address]    Frequently asked questions:  1. What is Obstructive Sleep Apnea (NURIA)? NURIA is the most common type of sleep apnea. Apnea means, \"without breath.\"  Apnea is most often caused by narrowing or collapse of the upper airway as muscles relax during sleep.   Almost everyone has occasional apneas. Most people with sleep apnea have had brief interruptions at night frequently for many years.  The severity of sleep apnea is related to how frequent and severe the events are.   2. What are the consequences of NURIA? Symptoms include: feeling sleepy during the day, snoring loudly, gasping or stopping of breathing, trouble sleeping, and occasionally morning headaches or heartburn at night.  Sleepiness can be serious and even increase the risk of falling asleep while driving. Other health consequences may include development of high blood pressure and other cardiovascular disease in persons who are susceptible. Untreated UNRIA can contribute to heart disease, stroke and diabetes.   3. What are the treatment options? In most situations, sleep apnea is a lifelong disease that must be managed with daily therapy. Medications are not effective for sleep apnea and surgery is generally not considered until other therapies have been tried. Your treatment is your choice . Continuous Positive Airway (CPAP) works right away and is the therapy that is effective in nearly everyone. An oral device to hold your jaw forward is usually the next most reliable option. Other options include postioning devices (to keep you off your back), weight loss, " and surgery including a tongue pacing device. There is more detail about some of these options below.    Important tips for using CPAP and similar devices   Know your equipment:  CPAP is continuous positive airway pressure that prevents obstructive sleep apnea by keeping the throat from collapsing while you are sleeping. In most cases, the device is  smart  and can slowly self-adjusts if your throat collapses and keeps a record every day of how well you are treated-this information is available to you and your care team.  BPAP is bilevel positive airway pressure that keeps your throat open and also assists each breath with a pressure boost to maintain adequate breathing.  Special kinds of BPAP are used in patients who have inadequate breathing from lung or heart disease. In most cases, the device is  smart  and can slowly self-adjusts to assist breathing. Like CPAP, the device keeps a record of how well you are treated.  Your mask is your connection to the device. You get to choose what feels most comfortable and the staff will help to make sure if fits. Here: are some examples of the different masks that are available:       Key points to remember on your journey with sleep apnea:  1. Sleep study.  PAP devices often need to be adjusted during a sleep study to show that they are effective and adjusted right.  2. Good tips to remember: Try wearing just the mask during a quiet time during the day so your body adapts to wearing it. A humidifier is recommended for comfort in most cases to prevent drying of your nose and throat. Allergy medication from your provider may help you if you are having nasal congestion.  3. Getting settled-in. It takes more than one night for most of us to get used to wearing a mask. Try wearing just the mask during a quiet time during the day so your body adapts to wearing it. A humidifier is recommended for comfort in most cases. Our team will work with you carefully on the first day and  will be in contact within 4 days and again at 2 and 4 weeks for advice and remote device adjustments. Your therapy is evaluated by the device each day.   4. Use it every night. The more you are able to sleep naturally for 7-8 hours, the more likely you will have good sleep and to prevent health risks or symptoms from sleep apnea. Even if you use it 4 hours it helps. Occasionally all of us are unable to use a medical therapy, in sleep apnea, it is not dangerous to miss one night.   5. Communicate. Call our skilled team on the number provided on the first day if your visit for problems that make it difficult to wear the device. Over 2 out of 3 patients can learn to wear the device long-term with help from our team. Remember to call our team or your sleep providers if you are unable to wear the device as we may have other solutions for those who cannot adapt to mask CPAP therapy. It is recommended that you sleep your sleep provider within the first 3 months and yearly after that if you are not having problems.   Take care of your equipment. Make sure you clean your mask and tubing using directions every day and that your filter and mask are replaced as recommended or if they are not working.     BESIDES CPAP, WHAT OTHER THERAPIES ARE THERE?    Positioning Device  Positioning devices are generally used when sleep apnea is mild and only occurs on your back.This example shows a pillow that straps around the waist. It may be appropriate for those whose sleep study shows milder sleep apnea that occurs primarily when lying flat on one's back. Preliminary studies have shown benefit but effectiveness at home may need to be verified by a home sleep test. These devices are generally not covered by medical insurance.    Oral Appliance  What is oral appliance therapy?  An oral appliance device fits on your teeth at night like a retainer used after having braces. The device is made by a specialized dentist and requires several  visits over 1-2 months before a manufactured device is made to fit your teeth and is adjusted to prevent your sleep apnea. Once an oral device is working properly, snoring should be improved. A home sleep test may be recommended at that time if to determine whether the sleep apnea is adequately treated.       Some things to remember:  -Oral devices are often, but not always, covered by your medical insurance. Be sure to check with your insurance provider.   -If you are referred for oral therapy, you will be given a list of specialized dentists to consider or you may choose to visit the Web site of the American Academy of Dental Sleep Medicine  -Oral devices are less likely to work if you have severe sleep apnea or are extremely overweight.     More detailed information  An oral appliance is a small acrylic device that fits over the upper and lower teeth  (similar to a retainer or a mouth guard). This device slightly moves jaw forward, which moves the base of the tongue forward, opens the airway, improves breathing for effective treat snoring and obstructive sleep apnea in perhaps 7 out of 10 people .  The best working devices are custom-made by a dental device  after a mold is made of the teeth 1, 2, 3.  When is an oral appliance indicated?  Oral appliance therapy is recommended as a first-line treatment for patients with primary snoring, mild sleep apnea, and for patients with moderate sleep apnea who prefer appliance therapy to use of CPAP4, 5. Severity of sleep apnea is determined by sleep testing and is based on the number of respiratory events per hour of sleep.   How successful is oral appliance therapy?  The success rate of oral appliance therapy in patients with mild sleep apnea is 75-80% while in patients with moderate sleep apnea it is 50-70%. The chance of success in patients with severe sleep apnea is 40-50%. The research also shows that oral appliances have a beneficial effect on the  cardiovascular health of NURIA patients at the same magnitude as CPAP therapy7.  Oral appliances should be a second-line treatment in cases of severe sleep apnea, but if not completely successful then a combination therapy utilizing CPAP plus oral appliance therapy may be effective. Oral appliances tend to be effective in a broad range of patients although studies show that the patients who have the highest success are females, younger patients, those with milder disease, and less severe obesity. 3, 6.   Finding a dentist that practices dental sleep medicine  Specific training is available through the American Academy of Dental Sleep Medicine for dentists interested in working in the field of sleep. To find a dentist who is educated in the field of sleep and the use of oral appliances, near you, visit the Web site of the American Academy of Dental Sleep Medicine.    References  1. Celia et al. Objectively measured vs self-reported compliance during oral appliance therapy for sleep-disordered breathing. Chest 2013; 144(5): 8047-0184.  2. Princess et al. Objective measurement of compliance during oral appliance therapy for sleep-disordered breathing. Thorax 2013; 68(1): 91-96.  3. Josh, et al. Mandibular advancement devices in 620 men and women with NURIA and snoring: tolerability and predictors of treatment success. Chest 2004; 125: 5051-5267.  4. Tr, et al. Oral appliances for snoring and NURIA: a review. Sleep 2006; 29: 244-262.  5. Giuseppe et al. Oral appliance treatment for NURIA: an update. J Clin Sleep Med 2014; 10(2): 215-227.  6. Radha et al. Predictors of OSAH treatment outcome. J Dent Res 2007; 86: 5550-7759.  Weight Loss:    Weight loss is a long-term strategy that may improve sleep apnea in some patients.    Weight management is a personal decision.  If you are interested in exploring weight loss strategies, the following discussion covers the impact on weight loss on sleep apnea and  the approaches that may be successful.    Weight loss decreases severity of sleep apnea in most people with obesity. For those with mild obesity who have developed snoring with weight gain, even 15-30 pound weight loss can improve and occasionally eliminate sleep apnea.  Structured and life-long dietary and health habits are necessary to lose weight and keep healthier weight levels.     Though there may be significant health benefits from weight loss, long-term weight loss is very difficult to achieve- studies show success with dietary management in less than 10% of people. In addition, substantial weight loss may require years of dietary control and may be difficult if patients have severe obesity. In these cases, surgical management may be considered.  Finally, older individuals who have tolerated obesity without health complications may be less likely to benefit from weight loss strategies.        Your BMI is Body mass index is 28.87 kg/(m^2).  Weight management is a personal decision.  If you are interested in exploring weight loss strategies, the following discussion covers the approaches that may be successful. Body mass index (BMI) is one way to tell whether you are at a healthy weight, overweight, or obese. It measures your weight in relation to your height.  A BMI of 18.5 to 24.9 is in the healthy range. A person with a BMI of 25 to 29.9 is considered overweight, and someone with a BMI of 30 or greater is considered obese. More than two-thirds of American adults are considered overweight or obese.  Being overweight or obese increases the risk for further weight gain. Excess weight may lead to heart disease and diabetes.  Creating and following plans for healthy eating and physical activity may help you improve your health.  Weight control is part of healthy lifestyle and includes exercise, emotional health, and healthy eating habits. Careful eating habits lifelong are the mainstay of weight control. Though  there are significant health benefits from weight loss, long-term weight loss with diet alone may be very difficult to achieve- studies show long-term success with dietary management in less than 10% of people. Attaining a healthy weight may be especially difficult to achieve in those with severe obesity. In some cases, medications, devices and surgical management might be considered.  What can you do?  If you are overweight or obese and are interested in methods for weight loss, you should discuss this with your provider.     Consider reducing daily calorie intake by 500 calories.     Keep a food journal.     Avoiding skipping meals, consider cutting portions instead.    Diet combined with exercise helps maintain muscle while optimizing fat loss. Strength training is particularly important for building and maintaining muscle mass. Exercise helps reduce stress, increase energy, and improves fitness. Increasing exercise without diet control, however, may not burn enough calories to loose weight.       Start walking three days a week 10-20 minutes at a time    Work towards walking thirty minutes five days a week     Eventually, increase the speed of your walking for 1-2 minutes at time    In addition, we recommend that you review healthy lifestyles and methods for weight loss available through the National Institutes of Health patient information sites:  http://win.niddk.nih.gov/publications/index.htm    And look into health and wellness programs that may be available through your health insurance provider, employer, local community center, or tami club.    Weight management plan: Patient was referred to their PCP to discuss a diet and exercise plan.    Surgery:    Surgery for obstructive sleep apnea is considered generally only when other therapies fail to work. Surgery may be discussed with you if you are having a difficult time tolerating CPAP and or when there is an abnormal structure that requires surgical  correction.  Nose and throat surgeries often enlarge the airway to prevent collapse.  Most of these surgeries create pain for 1-2 weeks and up to half of the most common surgeries are not effective throughout life.  You should carefully discuss the benefits and drawbacks to surgery with your sleep provider and surgeon to determine if it is the best solution for you.   More information  Surgery for NURIA is directed at areas that are responsible for narrowing or complete obstruction of the airway during sleep.  There are a wide range of procedures available to enlarge and/or stabilize the airway to prevent blockage of breathing in the three major areas where it can occur: the palate, tongue, and nasal regions.  Successful surgical treatment depends on the accurate identification of the factors responsible for obstructive sleep apnea in each person.  A personalized approach is required because there is no single treatment that works well for everyone.  Because of anatomic variation, consultation with an examination by a sleep surgeon is a critical first step in determining what surgical options are best for each patient.  In some cases, examination during sedation may be recommended in order to guide the selection of procedures.  Patients will be counseled about risks and benefits as well as the typical recovery course after surgery. Surgery is typically not a cure for a person s NURIA.  However, surgery will often significantly improve one s NURIA severity (termed  success rate ).  Even in the absence of a cure, surgery will decrease the cardiovascular risk associated with OSA7; improve overall quality of life8 (sleepiness, functionality, sleep quality, etc).  Palate Procedures:  Patients with NURIA often have narrowing of their airway in the region of their tonsils and uvula.  The goals of palate procedures are to widen the airway in this region as well as to help the tissues resist collapse.  Modern palate procedure  techniques focus on tissue conservation and soft tissue rearrangement, rather than tissue removal.  Often the uvula is preserved in this procedure. Residual sleep apnea is common in patient after pharyngoplasty with an average reduction in sleep apnea events of 33%2.    Tongue Procedures:  ExamWhile patients are awake, the muscles that surround the throat are active and keep this region open for breathing. These muscles relax during sleep, allowing the tongue and other structures to collapse and block breathing.  There are several different tongue procedures available.  Selection of a tongue base procedure depends on characteristics seen on physical exam.  Generally, procedures are aimed at removing bulky tissues in this area or preventing the back of the tongue from falling back during sleep.  Success rates for tongue surgery range from 50-62%3.  Hypoglossal Nerve Stimulation:  Hypoglossal nerve stimulation has recently received approval from the [place] Food and Drug Administration for the treatment of obstructive sleep apnea.  This is based on research showing that the system was safe and effective in treating sleep apnea6.  Results showed that the median AHI score decreased 68%, from 29.3 to 9.0. This therapy uses an implant system that senses breathing patterns and delivers mild stimulation to airway muscles, which keeps the airway open during sleep.  The system consists of three fully implanted components: a small generator (similar in size to a pacemaker), a breathing sensor, and a stimulation lead.  Using a small handheld remote, a patient turns the therapy on before bed and off upon awakening.    Candidates for this device must be greater than 22 years of age, have moderate to severe NURIA (AHI between 20-65), BMI less than 32, have tried CPAP/oral appliance without success, and have appropriate upper airway anatomy (determined by a sleep endoscopy performed by Dr. Angel).    Hypoglossal Nerve  Stimulation Pathway:    The sleep surgeon s office will work with the patient through the insurance prior-authorization process (including communications and appeals).    Nasal Procedures:  Nasal obstruction can interfere with nasal breathing during the day and night.  Studies have shown that relief of nasal obstruction can improve the ability of some patients to tolerate positive airway pressure therapy for obstructive sleep apnea1.  Treatment options include medications such as nasal saline, topical corticosteroid and antihistamine sprays, and oral medications such as antihistamines or decongestants. Non-surgical treatments can include external nasal dilators for selected patients. If these are not successful by themselves, surgery can improve the nasal airway either alone or in combination with these other options.  Combination Procedures:  Combination of surgical procedures and other treatments may be recommended, particularly if patients have more than one area of narrowing or persistent positional disease.  The success rate of combination surgery ranges from 66-80%2,3.    References  1. [place] DIAMOND. The Role of the Nose in Snoring and Obstructive Sleep Apnoea: An Update.  Eur Arch Otorhinolaryngol. 2011; 268: 1365-73.  2.  Alee SM; Mc JA; Bianca JR; Pallanch JF; Marina MB; Emily SG; Orlando PINA. Surgical modifications of the upper airway for obstructive sleep apnea in adults: a systematic review and meta-analysis. SLEEP 2010;33(10):6281-2789. Vero VALDEZ. Hypopharyngeal surgery in obstructive sleep apnea: an evidence-based medicine review.  Arch Otolaryngol Head Neck Surg. 2006 Feb;132(2):206-13.  3. Jacobo YH1, Margarette Y, Raudel SALMA. The efficacy of anatomically based multilevel surgery for obstructive sleep apnea. Otolaryngol Head Neck Surg. 2003 Oct;129(4):327-35.  4. Vero VALDEZ, Goldberg A. Hypopharyngeal Surgery in Obstructive Sleep Apnea: An Evidence-Based Medicine Review. Arch Otolaryngol Head Neck Surg.  2006 Feb;132(2):206-13.  5. Miri CALDERON et al. Upper-Airway Stimulation for Obstructive Sleep Apnea.  N Engl J Med. 2014 Jan 9;370(2):139-49.  6. Jack Y et al. Increased Incidence of Cardiovascular Disease in Middle-aged Men with Obstructive Sleep Apnea. Am J Respir Crit Care Med; 2002 166: 159-165  7. Sheri EM et al. Studying Life Effects and Effectiveness of Palatopharyngoplasty (SLEEP) study: Subjective Outcomes of Isolated Uvulopalatopharyngoplasty. Otolaryngol Head Neck Surg. 2011; 144: 623-631.    General recommendations for sleep problems (Insomnia)  Allow 2-4 weeks to see results     Establish a regular sleep schedule    Most people only need 7-8 hours of sleep.  Don't be in bed longer than you need     to sleep or you will end up spending more time awake in bed. This trains your    brain to think of the bed as a place to not sleep.  Go to bed at same time each night   Get up at same time each day - Set an alarm everyday (even weekends). This is one of    the most important tips. It prevents you from relying on your insomnia to get you    up on time for your day. That actually reinforces insomnia. It also will help your    body get into a pattern where you start feeling tired at a consistent time each    night.  The body functions best when you keep a consistent routine.  Avoid sleeping-in and napping. Anytime you sleep during the day, you will be less tired at    night. You may be tired enough to fall asleep, but you will wake more in the    middle of the night because you will have met your sleep need before the night is    done.   Cut down time in bed (if not asleep, get up)- Use your bed only for sleep and sex    Anytime you spend time in bed doing activities other than sleep (reading,    watching TV, working, playing on the computer or phone, or even just laying in    bed trying to sleep), you are training  your brain to think of the bed as a place to    do activities other than sleep. If you are not  falling asleep within 20-30 minutes,    get out of bed. While out of bed, avoid bright lights. Avoid work or chores. Being    productive in the middle of the night reinforces waking up at night. Find relaxing,    not particularly entertaining activities like reading, listening to music, or relaxation    exercises. Go back to bed if you start feeling groggy, or after about 30 minutes,    even if not feeling very tired. Sometimes, just getting out of bed stops the pattern    of getting frustrated about laying in bed not sleeping, and that can help you fall    asleep.   Avoid trying to force yourself to sleep- sleep is not like everything else. The harder you    work at most things, the more you can accomplish. The harder you work at    sleep, the less you will sleep.     Make the bedroom comfortable - quiet, dark and cool are better. Consider ear plugs    (silicon). Use dark blinds or wear an eye mask if needed     Make a relaxing routine prior to bedtime  Relaxation exercises:   Progressive muscle relaxation: Relax each muscle group individually    Begin with your feet, flex, then relax. Try to imagine your feet feeling heavy and sinking into the bed. Move to your calves, do the same thing. Work through each muscle group toward your head.    Relaxing Mental Imagery: Try to imagine a trip that you took and found relaxing, or imagine a day at the beach. Try to walk yourself through the day in your mind as if you were dreaming it. Try to imagine sensing the different experiences, such as feeling sand between your toes, the heat of the sun on your skin, seeing the waves crashing the shore, the smell of the salt water, etc.     Deal with your worries before bedtime    Set aside a worry time around dinner time for 10-15 minutes. Write down the    things that are on your mind. Plan time in the coming days to address those    issues. Brainstorm ideas on how you will deal with them. Try to identify issues    that are out of  your control, and try to let those issues go.  Listen to relaxation tapes   Classical Music or Nature sounds   Back Massage   Get regular exercise each day (at least 1-2 hours before bedtime)   Take medications only as directed   Eat a light bedtime snack or warm drink   Warm milk   Warm herbal tea (non-caffeinated)       Things to avoid   No overstimulating activities just before bed   No competitive games before bedtime   No exciting television programs before bedtime   Avoid caffeine after lunchtime   Avoid chocolate   Do not use alcohol to induce sleep (worsens Insomnia)   Do not take someone else's sleeping pills   Do not look at the clock when awakening   Do not turn on light when getting up to use bathroom, use a nightlight     Online Programs     www.VoltServer (pronounced shut eye). There is a fee for this program. Enter the code  [place]  if you decide to enroll in this program.      www.WiOfferIO.com (pronounced sleep ee oh). There is a fee for this program. Enter the code  [place]  if you decide to enroll in this program.     Suggested Resources  Insomnia Treatment Books     Overcoming Insomnia by Roldan Gutiérrez and Bernie Mc (2008)    No More Sleepless Nights by Antoine Santana and Jazmin Rizzo (1996)    Say Bart to Insomnia by Harley Pringle (2009)    The Insomnia Workbook by Gail Tsang and Santana Escobar (2009)    The Insomnia Answer by Eleazar Granda and Ryan Campbell (2006)      Stress Management and Relaxation Books    The Relaxation and Stress Reduction Workbook by Pau Perez, Helene Nash and William Shankar (2008)    Stress Management Workbook: Techniques and Self-Assessment Procedures by Candice Alex and Ganesh Marr (1997)    A Mindfulness-Based Stress Reduction Workbook by Venancio Gilliland and Ivory Rios (2010)    The Complete Stress Management Workbook by Angel Rascon, Boston Montanez and aFb Do (1996)    Assert Yourself by Kristina Talamantes  and Junior Talamantes (1977)    Relaxation Resources for Computer Download   These websites offer resources to help you relax. This list is for information only. [facility] is not responsible for the quality of services or the actions of any person or organization.  Progressive Muscle Relaxation (PMR):     http://www.LoraxAg/progressive-muscle-relaxation-exercise.html     http://studentsupport.[facility]/counseling/resources/self-help/relaxation-and-stress-management/   Deep Breathing Exercises:    http://www.LoraxAg/breathing-awareness.html     Meditation:     wwwFormaFina    www.Wiren BoardguidedWibiyameditation-site.com You may have to pay for some of these resources.    Guided Imagery:    http://www.LoraxAg/guided-imagery-scripts.html     http://[employer]/library/xmbhaqiimh-hakakz-htcouqi/     Counseling / Behavioral Health  [facility]  Visit [email or web address] or call [phone] to find a clinic close to you.  Or call [phone] for [facility].

## 2017-06-21 NOTE — PROGRESS NOTES
Sleep Consultation:    Date on this visit: 6/21/2017    Enrico Pedroza is sent by No ref. provider found for a sleep consultation regarding sleep apnea.    Primary Physician: William Green     Enrico Pedroza presents because he would like to try CPAP for previously diagnosed mild NURIA. He was initially seen in 1/2014 for symptoms of loud snoring and non-restorative sleep for 10 years or more. His medical history is significant for diverticulitis, moderate persistent asthma, allergic rhinitis and nephrolithiasis.    Mr. Pedroza had a home sleep study here in 2/2014. His AHI was 7.7/hr. His supine AHI was 14/hr. His O2 antonio was 81%. His weight was 207 pounds at the time of the study. His weight is the same today.    Enrico goes to sleep at 10:00 PM during the week. He reads or is on his phone for  minutes before falling asleep. If he reads, he falls asleep quickly, but when he is on his phone it takes a while. He wakes up at 7:15 AM with an alarm. He starts hitting the snooze at 6 AM. He falls asleep in 90 minutes.  Enrico has difficulty falling asleep if he is using his phone.  He wakes up 1-2 times a night for 10 minutes before falling back to sleep.  Enrico wakes up to his dog or to use the restroom.  On weekends, Enrico goes to sleep at 10:00 PM to midnight.  He wakes up at 8:00-9:00 AM without an alarm. He falls asleep in  minutes.  Patient gets an average of 7-8 hours of sleep per night. He never wakes feeling rested and feels it is a struggle to get out of bed.     Patient does use electronics in bed and read in bed and does not watch TV in bed and worry in bed about anything for the most part.     Enrico does not do shift work.  He works day shifts.  He works for a [employer] company. He assigns value to vehicles. He lives with his wife. They have 3 cats and 2 dogs.    Enrico does snore every night and snoring is variable. It will be loud if supine, a little less when lateral.  Patient does have a regular bed partner. His wife wears earplugs. There is report of occasional gasping, snorting and witnessed apneas. His wife tries to fall asleep before him. They rarely sleep separately due to his snoring.  Patient sleeps on his back and side. He denies snort arousals, morning dry mouth, morning headaches, morning confusion and restless legs. Enrico denies any bruxism, sleep walking, sleep talking, dream enactment, sleep paralysis, cataplexy and hypnogogic/hypnopompic hallucinations.    Enrico has difficulty breathing through his nose (narrow, restricted airway, Breathe-Right strips help) and reflux (not specifically at night, uses Tums), denies claustrophobia and depression.      Enrico describes themself as a night person.  He thinks his natural sleep schedule would be based on the sun. Sometimes if he gets up early, and if he sees the sun he has trouble getting back to sleep. He may still go back to bed and lay there because he is exhausted. However, he says he has at times slept all day. Patient's [place] Sleepiness score 3/24 inconsistent with daytime sleepiness.      Enrico denies taking naps on purpose because he does not feel better after naps. Enrico takes no inadvertant naps.  He says he has felt better when he paid closer attention to his diet. He denies dozing while driving. Patient was counseled on the importance of driving while alert, to pull over if drowsy, or nap before getting into the vehicle if sleepy.  He uses 5-6 cups/day of coffee, 3 (20 oz bottles) sodas/day. Last caffeine intake is usually before 6 PM.    Allergies:    Allergies   Allergen Reactions     Albuterol Difficulty breathing     Lung pain      No Clinical Screening - See Comments Difficulty breathing     Habanero peppers and ingredients in Orange Crush; SOB, sweating       Medications:    Current Outpatient Prescriptions   Medication Sig Dispense Refill     beclomethasone (QVAR) 80 MCG/ACT Inhaler Inhale 2  puffs into the lungs 2 times daily 1 Inhaler 11     levalbuterol (XOPENEX HFA) 45 MCG/ACT Inhaler Inhale 1-2 puffs into the lungs every 6 hours as needed for shortness of breath / dyspnea 1 Inhaler 11     OMEPRAZOLE PO Take by mouth every morning       ibuprofen (ADVIL,MOTRIN) 600 MG tablet Take 1 tablet (600 mg) by mouth every 6 hours as needed for moderate pain 30 tablet 0     mometasone (NASONEX) 50 MCG/ACT nasal spray Spray 2 sprays into both nostrils daily 17 g 11     mometasone (ASMANEX 30 METERED DOSES) 110 MCG/INH inhaler Inhale 1 puff into the lungs daily 1 Inhaler 11     cetirizine (ZYRTEC) 10 MG tablet Take 10 mg by mouth daily as needed  90 tablet 3     MECLIZINE HCL 25 MG OR TABS 1-2 TABLETS  EVERY 6 HOURS AS NEED FOR  DIZZNESS 60 3       Problem List:  Patient Active Problem List    Diagnosis Date Noted     Chronic diarrhea 04/03/2017     Priority: Medium     Ureteral stone 03/05/2014     Priority: Medium     Moderate persistent asthma 04/15/2013     Priority: Medium     Elbow pain 09/27/2012     Priority: Medium     CARDIOVASCULAR SCREENING; LDL GOAL LESS THAN 160 10/31/2010     Priority: Medium     Lumbago 06/17/2004     Priority: Medium     Allergic rhinitis 03/02/2004     Priority: Medium     Problem list name updated by automated process. Provider to review          Past Medical/Surgical History:  Past Medical History:   Diagnosis Date     Allergic rhinitis, cause unspecified      Intermittent asthma     exercise/allergies/molds     Nephrolithiasis      Past Surgical History:   Procedure Laterality Date     COMBINED CYSTOSCOPY, RETROGRADES, URETEROSCOPY, INSERT STENT  3/5/2014    Procedure: COMBINED CYSTOSCOPY, RETROGRADES, URETEROSCOPY, INSERT STENT;  CYSTOSCOPY, FLEXIBLE URETEROSCOPY, LEFT STENT PLACEMENT, LEFT RETROGRADES, STONE EXTRACTION;  Surgeon: Kenny Marroquin MD;  Location: [facility] OR     NO HISTORY OF SURGERY         Social History:  Social History     Social History     Marital  status:      Spouse name: N/A     Number of children: N/A     Years of education: N/A     Occupational History     Not on file.     Social History Main Topics     Smoking status: Former Smoker     Quit date: 1995     Smokeless tobacco: Never Used     Alcohol use 0.5 - 1.0 oz/week     1 - 2 drink(s) per week      Comment: once weekly     Drug use: No     Sexual activity: Yes     Partners: Female     Other Topics Concern     Not on file     Social History Narrative       Family History:  Family History   Problem Relation Age of Onset     DIABETES Other      cousin and grandfather     C.A.D. Paternal Grandfather       MI     CANCER Sister      skin     Hypertension Mother      Coronary Artery Disease Father        Review of Systems:  A complete review of systems reviewed by me is negative with the exeption of what has been mentioned in the history of present illness.  CONSTITUTIONAL: NEGATIVE for weight gain/loss, fever, chills, sweats or night sweats.  CONSTITUTIONAL:  POSITIVE for  drug allergies albuterol  EYES: NEGATIVE for changes in vision, blind spots, double vision.  ENT: NEGATIVE for ear pain, sore throat, sinus pain, post-nasal drip, runny nose, bloody nose  CARDIAC: NEGATIVE for fast heartbeats or fluttering in chest, chest pain or pressure, breathlessness when lying flat, swollen legs or swollen feet.  NEUROLOGIC: NEGATIVE headaches, weakness or numbness in the arms or legs.  DERMATOLOGIC:  POSITIVE for  rashes, new mole(s) and change in mole(s)  PULMONARY: NEGATIVE SOB at rest, SOB with activity, dry cough, productive cough, coughing up blood, whistling when breathing.    PULMONARY:  POSITIVE for  wheezing   GASTROINTESTINAL: NEGATIVE for nausea or vomitting, fat or grease in stools, constipation, abdominal pain, bowel movements black in color or blood noted.  GASTROINTESTINAL:  POSITIVE for  loose or watery stools  GENITOURINARY: NEGATIVE for pain during urination, blood in urine,  "urinating more frequently than usual, irregular menstrual periods.  MUSCULOSKELETAL: NEGATIVE for muscle pain, bone or joint pain, swollen joints.  ENDOCRINE: NEGATIVE for increased thirst or urination, diabetes.  LYMPHATIC: NEGATIVE for swollen lymph nodes, lumps or bumps in the breasts or nipple discharge.    Physical Examination:  Vitals: /81  Pulse 77  Temp 98.3  F (36.8  C) (Oral)  Ht 1.803 m (5' 11\")  Wt 93.9 kg (207 lb)  SpO2 98%  BMI 28.87 kg/m2  Neck Circumference: 41 cm.     [place] Total Score 6/21/2017   Total score - [place] 3       GENERAL APPEARANCE: healthy, alert, no distress and cooperative  EYES: Eyes grossly normal to inspection, PERRL, conjunctivae and sclerae normal and lids and lashes normal  HENT: nose and mouth without ulcers or lesions, oral mucous membranes moist and oropharynx clear  NECK: no adenopathy, no asymmetry, masses, or scars, thyroid normal to palpation and trachea midline and normal to palpation  RESP: lungs clear to auscultation - no rales, rhonchi or wheezes  CV: regular rates and rhythm, normal S1 S2, no S3 or S4, no murmur, click or rub and no irregular beats  LYMPHATICS: normal ant/post cervical and supraclavicular nodes  MS: extremities normal- no gross deformities noted  NEURO: Normal strength and tone, mentation intact, speech normal and cranial nerves 2-12 intact  Mallampati Class: I.  Tonsillar Stage: 2  visible at pillars.    Impression/Plan:    (G47.33) NURIA (obstructive sleep apnea)  (primary encounter diagnosis)  Comment: Mr. Pedroza has mild NURIA based on his home study from 2014. His AHI was 7/hr. His supine AHI was 14/hr. His weight is the same and symptoms are the same since then. His primary symptom is fatigue, not sleepiness. He feels exhausted and un-refreshed from sleep but denies napping or inadvertent dozing.   Plan: SLEEP DENTAL REFERRAL        Mr. Pedroza was referred to dentistry. He would not be qualified for a CPAP because his AHI is under " 15/hr and he has no ancillary diagnosis associated with NURIA (sleepiness, insomnia, HTN, depression, heart disease). He is concerned about nasal airflow with the dental appliance. He was shown nasal dilators online.    (R53.82) Chronic fatigue  Comment: I am a bit skeptical that his mild apnea is responsible for his fatigue, but we will see. Usually apnea causes sleepiness rather than fatigue. Mr. Pedroza denies inadvertent dozing. He may have some delayed sleep tendencies. He struggles to get up in the morning. He uses electronics at bedtime which delays sleep onset. He also consumes excessive caffeine.   Plan: Vitamin D lab ordered        He was advised to pick an 8 hour window of time to be in bed for sleep. Keep a consistent bedtime and wake time. Avoid hitting the snooze. Just set the alarm for the required wake time, not earlier. Reduce caffeine to 3 servings a day, none after about 3 PM. Avoid electronics in the 30-60 minutes before bedtime.    Literature provided regarding sleep apnea.      He will follow up with me in approximately 3 months, once his dental appliance is made.       Prior polysomnography reviewed.  Obstructive sleep apnea reviewed.  Complications of untreated sleep apnea were reviewed.  45 minutes was spent during this visit, over 50% in counseling and coordination of care.   Bennett Goltz, PA-C    CC: William Green MD

## 2017-07-06 ENCOUNTER — OFFICE VISIT (OUTPATIENT)
Dept: URGENT CARE | Facility: URGENT CARE | Age: 47
End: 2017-07-06
Payer: COMMERCIAL

## 2017-07-06 VITALS
TEMPERATURE: 98 F | BODY MASS INDEX: 29.01 KG/M2 | SYSTOLIC BLOOD PRESSURE: 120 MMHG | DIASTOLIC BLOOD PRESSURE: 77 MMHG | HEART RATE: 71 BPM | WEIGHT: 208 LBS | OXYGEN SATURATION: 97 %

## 2017-07-06 DIAGNOSIS — J30.2 ACUTE SEASONAL ALLERGIC RHINITIS, UNSPECIFIED TRIGGER: Primary | ICD-10-CM

## 2017-07-06 DIAGNOSIS — R07.0 THROAT PAIN: ICD-10-CM

## 2017-07-06 LAB
DEPRECATED S PYO AG THROAT QL EIA: NORMAL
MICRO REPORT STATUS: NORMAL
SPECIMEN SOURCE: NORMAL

## 2017-07-06 PROCEDURE — 99213 OFFICE O/P EST LOW 20 MIN: CPT | Performed by: PHYSICIAN ASSISTANT

## 2017-07-06 PROCEDURE — 87081 CULTURE SCREEN ONLY: CPT | Performed by: PHYSICIAN ASSISTANT

## 2017-07-06 PROCEDURE — 87880 STREP A ASSAY W/OPTIC: CPT | Performed by: PHYSICIAN ASSISTANT

## 2017-07-06 RX ORDER — CETIRIZINE HYDROCHLORIDE 10 MG/1
10 TABLET ORAL EVERY EVENING
Qty: 30 TABLET | Refills: 0 | Status: SHIPPED | OUTPATIENT
Start: 2017-07-06 | End: 2017-11-14

## 2017-07-06 RX ORDER — FLUTICASONE PROPIONATE 50 MCG
1-2 SPRAY, SUSPENSION (ML) NASAL DAILY
Qty: 1 BOTTLE | Refills: 0 | Status: SHIPPED | OUTPATIENT
Start: 2017-07-06 | End: 2019-07-02

## 2017-07-06 NOTE — MR AVS SNAPSHOT
After Visit Summary   7/6/2017    Enrico Pedroza    MRN: [id number]           Patient Information     Date Of Birth          1970        Visit Information        Provider Department      7/6/2017 7:00 PM Dorcas Cerna PA-C [facility]        Today's Diagnoses     Acute seasonal allergic rhinitis, unspecified trigger    -  1    Throat pain           Follow-ups after your visit        Who to contact     If you have questions or need follow up information about today's clinic visit or your schedule please contact [facility] directly at [phone].  Normal or non-critical lab and imaging results will be communicated to you by iJentohart, letter or phone within 4 business days after the clinic has received the results. If you do not hear from us within 7 days, please contact the clinic through iJentohart or phone. If you have a critical or abnormal lab result, we will notify you by phone as soon as possible.  Submit refill requests through Partnered or call your pharmacy and they will forward the refill request to us. Please allow 3 business days for your refill to be completed.          Additional Information About Your Visit        MyChart Information     Partnered gives you secure access to your electronic health record. If you see a primary care provider, you can also send messages to your care team and make appointments. If you have questions, please call your primary care clinic.  If you do not have a primary care provider, please call [phone] and they will assist you.        Care EveryWhere ID     This is your Care EveryWhere ID. This could be used by other organizations to access your [place] medical records  [id number]        Your Vitals Were     Pulse Temperature Pulse Oximetry BMI (Body Mass Index)          71 98  F (36.7  C) (Oral) 97% 29.01 kg/m2         Blood Pressure from Last 3 Encounters:   07/06/17 120/77    06/21/17 116/81   04/29/17 100/70    Weight from Last 3 Encounters:   07/06/17 208 lb (94.3 kg)   06/21/17 207 lb (93.9 kg)   04/29/17 208 lb 3.2 oz (94.4 kg)              We Performed the Following     Beta strep group A culture     Strep, Rapid Screen          Today's Medication Changes          These changes are accurate as of: 7/6/17  8:31 PM.  If you have any questions, ask your nurse or doctor.               Start taking these medicines.        Dose/Directions    fluticasone 50 MCG/ACT spray   Commonly known as:  FLONASE   Used for:  Acute seasonal allergic rhinitis, unspecified trigger   Started by:  Dorcas Cerna PA-C        Dose:  1-2 spray   Spray 1-2 sprays into both nostrils daily   Quantity:  1 Bottle   Refills:  0         These medicines have changed or have updated prescriptions.        Dose/Directions    * cetirizine 10 MG tablet   Commonly known as:  zyrTEC   This may have changed:  Another medication with the same name was added. Make sure you understand how and when to take each.   Changed by:  Michael Monroe DO        Dose:  10 mg   Take 10 mg by mouth daily as needed   Quantity:  90 tablet   Refills:  3       * cetirizine 10 MG tablet   Commonly known as:  zyrTEC   This may have changed:  You were already taking a medication with the same name, and this prescription was added. Make sure you understand how and when to take each.   Used for:  Acute seasonal allergic rhinitis, unspecified trigger   Changed by:  Dorcas Cerna PA-C        Dose:  10 mg   Take 1 tablet (10 mg) by mouth every evening   Quantity:  30 tablet   Refills:  0       * Notice:  This list has 2 medication(s) that are the same as other medications prescribed for you. Read the directions carefully, and ask your doctor or other care provider to review them with you.         Where to get your medicines      These medications were sent to [facility]/pharmacy #[id number] [place] [address]  [facility] [address]     Phone:  [phone]     cetirizine 10 MG tablet    fluticasone 50 MCG/ACT spray                Primary Care Provider Office Phone # Fax #    William Green [phone]       [facility] [address] [facility] [address]        Equal Access to Services     MARIE VERGARA : Hadii aad ku hadasho Soomaali, waaxda luqadaha, qaybta kaalmada adeegyada, waxay idiin hayaan adeeg khshirlenesh laBrittondaisy ah. So [facility] [phone].    ATENCIÓN: Si ni espcandelaria, tiene a aguilar disposición servicios gratuitos de asistencia lingüística. Naname al [phone].    We comply with applicable federal civil rights laws and [place] laws. We do not discriminate on the basis of race, color, national origin, age, disability sex, sexual orientation or gender identity.            Thank you!     Thank you for choosing [facility]  for your care. Our goal is always to provide you with excellent care. Hearing back from our patients is one way we can continue to improve our services. Please take a few minutes to complete the written survey that you may receive in the mail after your visit with us. Thank you!             Your Updated Medication List - Protect others around you: Learn how to safely use, store and throw away your medicines at www.disposemymeds.org.          This list is accurate as of: 7/6/17  8:31 PM.  Always use your most recent med list.                   Brand Name Dispense Instructions for use Diagnosis    beclomethasone 80 MCG/ACT Inhaler    QVAR    1 Inhaler    Inhale 2 puffs into the lungs 2 times daily    Moderate persistent asthma without complication       * cetirizine 10 MG tablet    zyrTEC    90 tablet    Take 10 mg by mouth daily as needed        * cetirizine 10 MG tablet    zyrTEC    30 tablet    Take 1 tablet (10 mg) by mouth every evening    Acute seasonal allergic rhinitis, unspecified trigger       fluticasone 50  MCG/ACT spray    FLONASE    1 Bottle    Spray 1-2 sprays into both nostrils daily    Acute seasonal allergic rhinitis, unspecified trigger       * IBUPROFEN PO      Take 200 mg by mouth        * ibuprofen 600 MG tablet    ADVIL/MOTRIN    30 tablet    Take 1 tablet (600 mg) by mouth every 6 hours as needed for moderate pain        levalbuterol 45 MCG/ACT Inhaler    XOPENEX HFA    1 Inhaler    Inhale 1-2 puffs into the lungs every 6 hours as needed for shortness of breath / dyspnea    Moderate persistent asthma without complication       meclizine 25 MG tablet    ANTIVERT    60    1-2 TABLETS  EVERY 6 HOURS AS NEED FOR  DIZZNESS    Labyrinthitis, unspecified       mometasone 110 MCG/INH inhaler    ASMANEX 30 METERED DOSES    1 Inhaler    Inhale 1 puff into the lungs daily    Moderate persistent asthma without complication       mometasone 50 MCG/ACT spray    NASONEX    17 g    Spray 2 sprays into both nostrils daily    Allergic rhinitis due to pollen       OMEPRAZOLE PO      Take by mouth every morning        * Notice:  This list has 4 medication(s) that are the same as other medications prescribed for you. Read the directions carefully, and ask your doctor or other care provider to review them with you.

## 2017-07-07 LAB
BACTERIA SPEC CULT: NORMAL
MICRO REPORT STATUS: NORMAL
SPECIMEN SOURCE: NORMAL

## 2017-07-07 NOTE — NURSING NOTE
"Chief Complaint   Patient presents with     Throat Problem     sore throat and white spots noticed x today        Initial /77 (BP Location: Left arm, Patient Position: Chair, Cuff Size: Adult Regular)  Pulse 71  Temp 98  F (36.7  C) (Oral)  Wt 208 lb (94.3 kg)  SpO2 97%  BMI 29.01 kg/m2 Estimated body mass index is 29.01 kg/(m^2) as calculated from the following:    Height as of 6/21/17: 5' 11\" (1.803 m).    Weight as of this encounter: 208 lb (94.3 kg).  Medication Reconciliation: complete    "

## 2017-07-07 NOTE — PROGRESS NOTES
SUBJECTIVE:   Enrico Pedroza is a 46 year old male presenting with a chief complaint of   1) sore throat today with white spots.  No known fevers.  2) no congestion or runny nose or cough  Onset of symptoms was as above.  Course of illness is worsening.    Severity moderate  Current and Associated symptoms: as per HPI  Treatment measures tried include None tried.  Predisposing factors include None.  Has been around a lot of different people this week at funerals.      Past Medical History:   Diagnosis Date     Allergic rhinitis, cause unspecified      Intermittent asthma     exercise/allergies/molds     Nephrolithiasis      Patient Active Problem List   Diagnosis     Allergic rhinitis     Lumbago     CARDIOVASCULAR SCREENING; LDL GOAL LESS THAN 160     Elbow pain     Moderate persistent asthma     Ureteral stone     Chronic diarrhea     Social History   Substance Use Topics     Smoking status: Former Smoker     Packs/day: 1.00     Years: 6.00     Quit date: 1/1/1995     Smokeless tobacco: Never Used     Alcohol use 0.5 - 1.0 oz/week     1 - 2 Standard drinks or equivalent per week      Comment: once weekly       ROS:  CONSTITUTIONAL:NEGATIVE for fever, chills, change in weight  INTEGUMENTARY/SKIN: NEGATIVE for worrisome rashes, moles or lesions  EYES: NEGATIVE for vision changes or irritation  ENT/MOUTH: as per HPI  RESP:NEGATIVE for significant cough or SOB  CV: NEGATIVE for chest pain, palpitations or peripheral edema  GI: NEGATIVE for nausea, abdominal pain, heartburn, or change in bowel habits    OBJECTIVE  :/77 (BP Location: Left arm, Patient Position: Chair, Cuff Size: Adult Regular)  Pulse 71  Temp 98  F (36.7  C) (Oral)  Wt 208 lb (94.3 kg)  SpO2 97%  BMI 29.01 kg/m2  GENERAL APPEARANCE: healthy, alert and no distress  EYES: EOMI,  PERRL, conjunctiva clear  HENT: ear canals and TM's normal.  Nose and mouth without ulcers, erythema or lesions  HENT: nasal turbinates boggy with bluish hue and  rhinorrhea clear  NECK: supple, nontender, no lymphadenopathy  RESP: lungs clear to auscultation - no rales, rhonchi or wheezes  CV: regular rates and rhythm, normal S1 S2, no murmur noted  ABDOMEN:  soft, nontender, no HSM or masses and bowel sounds normal  NEURO: Normal strength and tone, sensory exam grossly normal,  normal speech and mentation  SKIN: no suspicious lesions or rashes    (J30.2) Acute seasonal allergic rhinitis, unspecified trigger  (primary encounter diagnosis)  Comment:   Plan: cetirizine (ZYRTEC) 10 MG tablet, fluticasone         (FLONASE) 50 MCG/ACT spray            (R07.0) Throat pain  Comment:   Plan: Strep, Rapid Screen, Beta strep group A culture        Tylenol   Salt water gargles.      F/U with PCP should symptoms persist or worsen.     Patient expresses understanding and agreement with the assessment and plan as above.

## 2017-11-14 ENCOUNTER — OFFICE VISIT (OUTPATIENT)
Dept: URGENT CARE | Facility: URGENT CARE | Age: 47
End: 2017-11-14
Payer: COMMERCIAL

## 2017-11-14 VITALS
TEMPERATURE: 97.5 F | HEART RATE: 80 BPM | OXYGEN SATURATION: 97 % | WEIGHT: 209 LBS | DIASTOLIC BLOOD PRESSURE: 81 MMHG | SYSTOLIC BLOOD PRESSURE: 117 MMHG | BODY MASS INDEX: 29.15 KG/M2

## 2017-11-14 DIAGNOSIS — J45.901 MILD ASTHMA WITH EXACERBATION, UNSPECIFIED WHETHER PERSISTENT: ICD-10-CM

## 2017-11-14 DIAGNOSIS — J01.90 ACUTE SINUSITIS WITH SYMPTOMS > 10 DAYS: Primary | ICD-10-CM

## 2017-11-14 PROCEDURE — 99214 OFFICE O/P EST MOD 30 MIN: CPT | Performed by: PHYSICIAN ASSISTANT

## 2017-11-14 RX ORDER — LEVALBUTEROL TARTRATE 45 UG/1
2 AEROSOL, METERED ORAL EVERY 6 HOURS PRN
Qty: 1 INHALER | Refills: 1 | Status: SHIPPED | OUTPATIENT
Start: 2017-11-14 | End: 2018-06-20

## 2017-11-14 RX ORDER — AZITHROMYCIN 250 MG/1
TABLET, FILM COATED ORAL
Qty: 6 TABLET | Refills: 0 | Status: SHIPPED | OUTPATIENT
Start: 2017-11-14 | End: 2018-06-26

## 2017-11-14 RX ORDER — PREDNISONE 20 MG/1
20 TABLET ORAL DAILY
Qty: 5 TABLET | Refills: 0 | Status: SHIPPED | OUTPATIENT
Start: 2017-11-14 | End: 2018-06-26

## 2017-11-14 NOTE — MR AVS SNAPSHOT
After Visit Summary   11/14/2017    Enrico Pedroza    MRN: [id number]           Patient Information     Date Of Birth          1970        Visit Information        Provider Department      11/14/2017 10:40 AM Dorcas Cerna PA-C [facility]        Today's Diagnoses     Acute sinusitis with symptoms > 10 days    -  1    Mild asthma with exacerbation, unspecified whether persistent          Care Instructions        (J01.90) Acute sinusitis with symptoms > 10 days  (primary encounter diagnosis)  Comment:   Plan: predniSONE (DELTASONE) 20 MG tablet,         azithromycin (ZITHROMAX) 250 MG tablet            (J45.901) Mild asthma with exacerbation, unspecified whether persistent  Comment:   Plan: levalbuterol (XOPENEX HFA) 45 MCG/ACT Inhaler          Follow up with primary clinic for re-check in 2 weeks.       Have flu shot after finishing prednisone.            Follow-ups after your visit        Who to contact     If you have questions or need follow up information about today's clinic visit or your schedule please contact [facility] directly at [phone].  Normal or non-critical lab and imaging results will be communicated to you by BlackbookHRhart, letter or phone within 4 business days after the clinic has received the results. If you do not hear from us within 7 days, please contact the clinic through Profit Pointt or phone. If you have a critical or abnormal lab result, we will notify you by phone as soon as possible.  Submit refill requests through [employer]Health or call your pharmacy and they will forward the refill request to us. Please allow 3 business days for your refill to be completed.          Additional Information About Your Visit        BlackbookHRhart Information     GoHealth gives you secure access to your electronic health record. If you see a primary care provider, you can also send messages to your care team and make appointments. If you  have questions, please call your primary care clinic.  If you do not have a primary care provider, please call [phone] and they will assist you.        Care EveryWhere ID     This is your Care EveryWhere ID. This could be used by other organizations to access your [place] medical records  [id number]        Your Vitals Were     Pulse Temperature Pulse Oximetry BMI (Body Mass Index)          80 97.5  F (36.4  C) (Oral) 97% 29.15 kg/m2         Blood Pressure from Last 3 Encounters:   11/14/17 117/81   07/06/17 120/77   06/21/17 116/81    Weight from Last 3 Encounters:   11/14/17 209 lb (94.8 kg)   07/06/17 208 lb (94.3 kg)   06/21/17 207 lb (93.9 kg)              Today, you had the following     No orders found for display         Today's Medication Changes          These changes are accurate as of: 11/14/17 11:34 AM.  If you have any questions, ask your nurse or doctor.               Start taking these medicines.        Dose/Directions    azithromycin 250 MG tablet   Commonly known as:  ZITHROMAX   Used for:  Acute sinusitis with symptoms > 10 days   Started by:  Dorcas Cerna PA-C        Two tablets first day, then one tablet daily for four days.   Quantity:  6 tablet   Refills:  0       predniSONE 20 MG tablet   Commonly known as:  DELTASONE   Used for:  Acute sinusitis with symptoms > 10 days   Started by:  Dorcas Cerna PA-C        Dose:  20 mg   Take 1 tablet (20 mg) by mouth daily   Quantity:  5 tablet   Refills:  0         These medicines have changed or have updated prescriptions.        Dose/Directions    cetirizine 10 MG tablet   Commonly known as:  zyrTEC   This may have changed:  Another medication with the same name was removed. Continue taking this medication, and follow the directions you see here.   Changed by:  Michael Monroe DO        Dose:  10 mg   Take 10 mg by mouth daily as needed   Quantity:  90 tablet   Refills:  3       * levalbuterol 45 MCG/ACT Inhaler    Commonly known as:  XOPENEX HFA   This may have changed:  Another medication with the same name was added. Make sure you understand how and when to take each.   Used for:  Moderate persistent asthma without complication   Changed by:  William Green MD        Dose:  1-2 puff   Inhale 1-2 puffs into the lungs every 6 hours as needed for shortness of breath / dyspnea   Quantity:  1 Inhaler   Refills:  11       * levalbuterol 45 MCG/ACT Inhaler   Commonly known as:  XOPENEX HFA   This may have changed:  You were already taking a medication with the same name, and this prescription was added. Make sure you understand how and when to take each.   Used for:  Mild asthma with exacerbation, unspecified whether persistent   Changed by:  Dorcas Cerna PA-C        Dose:  2 puff   Inhale 2 puffs into the lungs every 6 hours as needed for shortness of breath / dyspnea or wheezing   Quantity:  1 Inhaler   Refills:  1       * Notice:  This list has 2 medication(s) that are the same as other medications prescribed for you. Read the directions carefully, and ask your doctor or other care provider to review them with you.      Stop taking these medicines if you haven't already. Please contact your care team if you have questions.     ibuprofen 600 MG tablet   Commonly known as:  ADVIL/MOTRIN   Stopped by:  Dorcas Cerna PA-C           IBUPROFEN PO   Stopped by:  Dorcas Cerna PA-C           mometasone 50 MCG/ACT spray   Commonly known as:  NASONEX   Stopped by:  Dorcas Cerna PA-C                Where to get your medicines      These medications were sent to [facility]/pharmacy #[id number] - [place], [place] - [address] [facility]  [address] [place] [address]     Phone:  [phone]     azithromycin 250 MG tablet    levalbuterol 45 MCG/ACT Inhaler    predniSONE 20 MG tablet                Primary Care Provider Office Phone # Fax #    William Green [phone]  [phone]       [address] [facility] [address]        Equal Access to Services     MARIE VERGARA : Hadii aad ku hadlizzieo Sojen, wamikda luqadaha, qaybta kaalmada brendan, jerod alexander ladylanpedro cooney. So [facility] [phone].    ATENCIÓN: Si habla español, tiene a aguilar disposición servicios gratuitos de asistencia lingüística. Llame al [phone].    We comply with applicable federal civil rights laws and [place] laws. We do not discriminate on the basis of race, color, national origin, age, disability, sex, sexual orientation, or gender identity.            Thank you!     Thank you for choosing [facility]  for your care. Our goal is always to provide you with excellent care. Hearing back from our patients is one way we can continue to improve our services. Please take a few minutes to complete the written survey that you may receive in the mail after your visit with us. Thank you!             Your Updated Medication List - Protect others around you: Learn how to safely use, store and throw away your medicines at www.disposemymeds.org.          This list is accurate as of: 11/14/17 11:34 AM.  Always use your most recent med list.                   Brand Name Dispense Instructions for use Diagnosis    azithromycin 250 MG tablet    ZITHROMAX    6 tablet    Two tablets first day, then one tablet daily for four days.    Acute sinusitis with symptoms > 10 days       beclomethasone 80 MCG/ACT Inhaler    QVAR    1 Inhaler    Inhale 2 puffs into the lungs 2 times daily    Moderate persistent asthma without complication       cetirizine 10 MG tablet    zyrTEC    90 tablet    Take 10 mg by mouth daily as needed        fluticasone 50 MCG/ACT spray    FLONASE    1 Bottle    Spray 1-2 sprays into both nostrils daily    Acute seasonal allergic rhinitis, unspecified trigger       * levalbuterol 45 MCG/ACT Inhaler    XOPENEX HFA    1 Inhaler    Inhale 1-2 puffs into the lungs  every 6 hours as needed for shortness of breath / dyspnea    Moderate persistent asthma without complication       * levalbuterol 45 MCG/ACT Inhaler    XOPENEX HFA    1 Inhaler    Inhale 2 puffs into the lungs every 6 hours as needed for shortness of breath / dyspnea or wheezing    Mild asthma with exacerbation, unspecified whether persistent       meclizine 25 MG tablet    ANTIVERT    60    1-2 TABLETS  EVERY 6 HOURS AS NEED FOR  DIZZNESS    Labyrinthitis, unspecified       mometasone 110 MCG/INH inhaler    ASMANEX 30 METERED DOSES    1 Inhaler    Inhale 1 puff into the lungs daily    Moderate persistent asthma without complication       OMEPRAZOLE PO      Take by mouth every morning        predniSONE 20 MG tablet    DELTASONE    5 tablet    Take 1 tablet (20 mg) by mouth daily    Acute sinusitis with symptoms > 10 days       TYLENOL PO      Take 1,000 mg by mouth as needed for mild pain or fever        * Notice:  This list has 2 medication(s) that are the same as other medications prescribed for you. Read the directions carefully, and ask your doctor or other care provider to review them with you.

## 2017-11-14 NOTE — PATIENT INSTRUCTIONS
(J01.90) Acute sinusitis with symptoms > 10 days  (primary encounter diagnosis)  Comment:   Plan: predniSONE (DELTASONE) 20 MG tablet,         azithromycin (ZITHROMAX) 250 MG tablet            (J45.901) Mild asthma with exacerbation, unspecified whether persistent  Comment:   Plan: levalbuterol (XOPENEX HFA) 45 MCG/ACT Inhaler          Follow up with primary clinic for re-check in 2 weeks.       Have flu shot after finishing prednisone.

## 2017-11-14 NOTE — NURSING NOTE
"Chief Complaint   Patient presents with     Urgent Care     cough, chest congestion, stinging feeling in chest when breathing, labor breathing and running stuffy nose in for 4 days. Reports he does have asthma.       Initial /81  Pulse 80  Temp 97.5  F (36.4  C) (Oral)  Wt 209 lb (94.8 kg)  SpO2 97%  BMI 29.15 kg/m2 Estimated body mass index is 29.15 kg/(m^2) as calculated from the following:    Height as of 6/21/17: 5' 11\" (1.803 m).    Weight as of this encounter: 209 lb (94.8 kg).  Medication Reconciliation: complete    "

## 2017-11-14 NOTE — PROGRESS NOTES
SUBJECTIVE:   Enrico Pedroza is a 47 year old male presenting with a chief complaint of   1) sinus congestion for the past couple of weeks.  2) productive cough with wheezing.  Used albuterol inhaler this morning with some relief, but not complete.    No fevers.    Onset of symptoms was as above.  Course of illness is worsening.    Severity moderate  Current and Associated symptoms:   Treatment measures tried include Inhaler (name: xopenex).  Predisposing factors include HX of asthma.    Past Medical History:   Diagnosis Date     Allergic rhinitis, cause unspecified      Intermittent asthma     exercise/allergies/molds     Nephrolithiasis      Patient Active Problem List   Diagnosis     Allergic rhinitis     Lumbago     CARDIOVASCULAR SCREENING; LDL GOAL LESS THAN 160     Elbow pain     Moderate persistent asthma     Ureteral stone     Chronic diarrhea     Social History   Substance Use Topics     Smoking status: Former Smoker     Packs/day: 1.00     Years: 6.00     Quit date: 1/1/1995     Smokeless tobacco: Never Used     Alcohol use 0.5 - 1.0 oz/week     1 - 2 Standard drinks or equivalent per week      Comment: once weekly       ROS:  CONSTITUTIONAL:NEGATIVE for fever, chills, change in weight  INTEGUMENTARY/SKIN: NEGATIVE for worrisome rashes, moles or lesions  EYES: NEGATIVE for vision changes or irritation  ENT/MOUTH: as per HPI  RESP:as per HPI  CV: NEGATIVE for chest pain, palpitations or peripheral edema  GI: NEGATIVE for nausea, abdominal pain, heartburn, or change in bowel habits  MUSCULOSKELETAL: NEGATIVE for significant arthralgias or myalgia    OBJECTIVE  :/81  Pulse 80  Temp 97.5  F (36.4  C) (Oral)  Wt 209 lb (94.8 kg)  SpO2 97%  BMI 29.15 kg/m2  GENERAL APPEARANCE: healthy, alert and no distress  EYES: EOMI,  PERRL, conjunctiva clear  HENT: ear canals and TM's normal.  Nose and mouth without ulcers, erythema or lesions  HENT: nasal turbinates boggy with bluish hue and rhinorrhea  yellow  NECK: supple, nontender, no lymphadenopathy  RESP: a few scattered wheezes  CV: regular rates and rhythm, normal S1 S2, no murmur noted  ABDOMEN:  soft, nontender, no HSM or masses and bowel sounds normal  NEURO: Normal strength and tone, sensory exam grossly normal,  normal speech and mentation  SKIN: no suspicious lesions or rashes    (J01.90) Acute sinusitis with symptoms > 10 days  (primary encounter diagnosis)  Comment:   Plan: predniSONE (DELTASONE) 20 MG tablet,         azithromycin (ZITHROMAX) 250 MG tablet            (J45.901) Mild asthma with exacerbation, unspecified whether persistent  Comment:   Plan: levalbuterol (XOPENEX HFA) 45 MCG/ACT Inhaler          Follow up with primary clinic for re-check in 2 weeks.     Patient expresses understanding and agreement with the assessment and plan as above.

## 2018-01-12 ENCOUNTER — OFFICE VISIT (OUTPATIENT)
Dept: URGENT CARE | Facility: URGENT CARE | Age: 48
End: 2018-01-12
Payer: COMMERCIAL

## 2018-01-12 VITALS
SYSTOLIC BLOOD PRESSURE: 122 MMHG | WEIGHT: 210.1 LBS | RESPIRATION RATE: 16 BRPM | TEMPERATURE: 97.4 F | OXYGEN SATURATION: 96 % | HEART RATE: 72 BPM | BODY MASS INDEX: 29.3 KG/M2 | DIASTOLIC BLOOD PRESSURE: 80 MMHG

## 2018-01-12 DIAGNOSIS — R09.89 CHEST CONGESTION: ICD-10-CM

## 2018-01-12 DIAGNOSIS — J45.901 MODERATE ASTHMA WITH EXACERBATION, UNSPECIFIED WHETHER PERSISTENT: ICD-10-CM

## 2018-01-12 DIAGNOSIS — R05.8 PRODUCTIVE COUGH: Primary | ICD-10-CM

## 2018-01-12 PROCEDURE — 99214 OFFICE O/P EST MOD 30 MIN: CPT | Performed by: PHYSICIAN ASSISTANT

## 2018-01-12 RX ORDER — FLUTICASONE PROPIONATE 220 UG/1
2 AEROSOL, METERED RESPIRATORY (INHALATION) 2 TIMES DAILY
Qty: 1 INHALER | Refills: 3 | Status: SHIPPED | OUTPATIENT
Start: 2018-01-12 | End: 2018-06-26

## 2018-01-12 RX ORDER — AZITHROMYCIN 250 MG/1
TABLET, FILM COATED ORAL
Qty: 6 TABLET | Refills: 0 | Status: SHIPPED | OUTPATIENT
Start: 2018-01-12 | End: 2018-06-26

## 2018-01-12 NOTE — PROGRESS NOTES
SUBJECTIVE:   Enrico Pedroza is a 47 year old male presenting with a chief complaint of purulent nasal drainage, chest congestion, productive cough.  Onset of symptoms was 8 day(s) ago.  Course of illness is worsening.    Severity moderate  Current and Associated symptoms: asthma  Treatment measures tried include OTC Cough med.  Predisposing factors include asthma.    Past Medical History:   Diagnosis Date     Allergic rhinitis, cause unspecified      Intermittent asthma     exercise/allergies/molds     Nephrolithiasis         Allergies   Allergen Reactions     Albuterol Difficulty breathing     Lung pain      No Clinical Screening - See Comments Difficulty breathing     Habanero peppers and ingredients in Orange Crush; SOB, sweating         Social History   Substance Use Topics     Smoking status: Former Smoker     Packs/day: 1.00     Years: 6.00     Quit date: 1/1/1995     Smokeless tobacco: Never Used     Alcohol use 0.5 - 1.0 oz/week     1 - 2 Standard drinks or equivalent per week      Comment: once weekly       ROS:  CONSTITUTIONAL:NEGATIVE for fever, chills, change in weight  INTEGUMENTARY/SKIN: NEGATIVE for worrisome rashes, moles or lesions  ENT/MOUTH: POSITIVE for purulent nasal drainage  RESP:POSITIVE for cough-productive and chest congestion  CV: NEGATIVE for chest pain, palpitations or peripheral edema  GI: NEGATIVE for nausea, abdominal pain, heartburn, or change in bowel habits  MUSCULOSKELETAL: NEGATIVE for significant arthralgias or myalgia  NEURO: NEGATIVE for weakness, dizziness or paresthesias    OBJECTIVE  :/80  Pulse 72  Temp 97.4  F (36.3  C) (Oral)  Resp 16  Wt 210 lb 1.6 oz (95.3 kg)  SpO2 96%  BMI 29.3 kg/m2  GENERAL APPEARANCE: healthy, alert and no distress  EYES: EOMI,  PERRL, conjunctiva clear  HENT: TM's normal bilaterally, nasal turbinates erythematous, swollen and rhinorrhea   NECK: supple, nontender, no lymphadenopathy  RESP: Positive for chest tightness  CV: regular  rates and rhythm, normal S1 S2, no murmur noted  NEURO: Normal strength and tone, sensory exam grossly normal,  normal speech and mentation  SKIN: no suspicious lesions or rashes    ASSESSMENT/PLAN      ICD-10-CM    1. Productive cough R05 azithromycin (ZITHROMAX) 250 MG tablet   2. Chest congestion R09.89 azithromycin (ZITHROMAX) 250 MG tablet   3. Moderate asthma with exacerbation, unspecified whether persistent J45.901 fluticasone (FLOVENT HFA) 220 MCG/ACT Inhaler       Continue using xopenex prn  Start pt back on steroid inhaler for asthma  Follow up as needed  See orders in Epic

## 2018-01-12 NOTE — MR AVS SNAPSHOT
After Visit Summary   1/12/2018    Enrico Pedroza    MRN: [id number]           Patient Information     Date Of Birth          1970        Visit Information        Provider Department      1/12/2018 9:15 AM Tate Del Real PA-C [facility]        Today's Diagnoses     Productive cough    -  1    Chest congestion        Moderate asthma with exacerbation, unspecified whether persistent           Follow-ups after your visit        Who to contact     If you have questions or need follow up information about today's clinic visit or your schedule please contact [facility] directly at [phone].  Normal or non-critical lab and imaging results will be communicated to you by Crestockhart, letter or phone within 4 business days after the clinic has received the results. If you do not hear from us within 7 days, please contact the clinic through Crestockhart or phone. If you have a critical or abnormal lab result, we will notify you by phone as soon as possible.  Submit refill requests through Zumigo or call your pharmacy and they will forward the refill request to us. Please allow 3 business days for your refill to be completed.          Additional Information About Your Visit        MyChart Information     Zumigo gives you secure access to your electronic health record. If you see a primary care provider, you can also send messages to your care team and make appointments. If you have questions, please call your primary care clinic.  If you do not have a primary care provider, please call [phone] and they will assist you.        Care EveryWhere ID     This is your Care EveryWhere ID. This could be used by other organizations to access your [place] medical records  [id number]        Your Vitals Were     Pulse Temperature Respirations Pulse Oximetry BMI (Body Mass Index)       72 97.4  F (36.3  C) (Oral) 16 96% 29.3 kg/m2        Blood Pressure from  Last 3 Encounters:   01/12/18 122/80   11/14/17 117/81   07/06/17 120/77    Weight from Last 3 Encounters:   01/12/18 210 lb 1.6 oz (95.3 kg)   11/14/17 209 lb (94.8 kg)   07/06/17 208 lb (94.3 kg)              Today, you had the following     No orders found for display         Today's Medication Changes          These changes are accurate as of: 1/12/18  9:54 AM.  If you have any questions, ask your nurse or doctor.               Start taking these medicines.        Dose/Directions    fluticasone 220 MCG/ACT Inhaler   Commonly known as:  FLOVENT HFA   Used for:  Moderate asthma with exacerbation, unspecified whether persistent   Started by:  Tate Del Real PA-C        Dose:  2 puff   Inhale 2 puffs into the lungs 2 times daily   Quantity:  1 Inhaler   Refills:  3         These medicines have changed or have updated prescriptions.        Dose/Directions    * azithromycin 250 MG tablet   Commonly known as:  ZITHROMAX   This may have changed:  Another medication with the same name was added. Make sure you understand how and when to take each.   Used for:  Acute sinusitis with symptoms > 10 days   Changed by:  Dorcas Cerna PA-C        Two tablets first day, then one tablet daily for four days.   Quantity:  6 tablet   Refills:  0       * azithromycin 250 MG tablet   Commonly known as:  ZITHROMAX   This may have changed:  You were already taking a medication with the same name, and this prescription was added. Make sure you understand how and when to take each.   Used for:  Productive cough, Chest congestion   Changed by:  Tate Del Real PA-C        2 tabs po qd day 1, then 1 tab po qd days 2-5   Quantity:  6 tablet   Refills:  0       * Notice:  This list has 2 medication(s) that are the same as other medications prescribed for you. Read the directions carefully, and ask your doctor or other care provider to review them with you.         Where to get your medicines      These medications were sent to  [place], [place] - [address]  [address] [facility] [address]     Phone:  [phone]     azithromycin 250 MG tablet    fluticasone 220 MCG/ACT Inhaler                Primary Care Provider Office Phone # Fax #    William Green [phone]       [address] [facility] [address]        Equal Access to Services     MARIE VERGARA : Hadii aad ku hadasho Soomaali, waaxda luqadaha, qaybta kaalmada adeegyada, waxay idiin hayaan adeeg kharash la'aan . So [facility] [phone].    ATENCIÓN: Si habla español, tiene a aguilar disposición servicios gratuitos de asistencia lingüística. LlSelect [facility] [phone].    We comply with applicable federal civil rights laws and [place] laws. We do not discriminate on the basis of race, color, national origin, age, disability, sex, sexual orientation, or gender identity.            Thank you!     Thank you for choosing [facility]  for your care. Our goal is always to provide you with excellent care. Hearing back from our patients is one way we can continue to improve our services. Please take a few minutes to complete the written survey that you may receive in the mail after your visit with us. Thank you!             Your Updated Medication List - Protect others around you: Learn how to safely use, store and throw away your medicines at www.disposemymeds.org.          This list is accurate as of: 1/12/18  9:54 AM.  Always use your most recent med list.                   Brand Name Dispense Instructions for use Diagnosis    * azithromycin 250 MG tablet    ZITHROMAX    6 tablet    Two tablets first day, then one tablet daily for four days.    Acute sinusitis with symptoms > 10 days       * azithromycin 250 MG tablet    ZITHROMAX    6 tablet    2 tabs po qd day 1, then 1 tab po qd days 2-5    Productive cough, Chest congestion       beclomethasone 80 MCG/ACT Inhaler    QVAR    1 Inhaler    Inhale 2  puffs into the lungs 2 times daily    Moderate persistent asthma without complication       cetirizine 10 MG tablet    zyrTEC    90 tablet    Take 10 mg by mouth daily as needed        DAYQUIL/NYQUIL COLD/FLU RELIEF OR           fluticasone 220 MCG/ACT Inhaler    FLOVENT HFA    1 Inhaler    Inhale 2 puffs into the lungs 2 times daily    Moderate asthma with exacerbation, unspecified whether persistent       fluticasone 50 MCG/ACT spray    FLONASE    1 Bottle    Spray 1-2 sprays into both nostrils daily    Acute seasonal allergic rhinitis, unspecified trigger       * levalbuterol 45 MCG/ACT Inhaler    XOPENEX HFA    1 Inhaler    Inhale 1-2 puffs into the lungs every 6 hours as needed for shortness of breath / dyspnea    Moderate persistent asthma without complication       * levalbuterol 45 MCG/ACT Inhaler    XOPENEX HFA    1 Inhaler    Inhale 2 puffs into the lungs every 6 hours as needed for shortness of breath / dyspnea or wheezing    Mild asthma with exacerbation, unspecified whether persistent       meclizine 25 MG tablet    ANTIVERT    60    1-2 TABLETS  EVERY 6 HOURS AS NEED FOR  DIZZNESS    Labyrinthitis, unspecified       mometasone 110 MCG/INH inhaler    ASMANEX 30 METERED DOSES    1 Inhaler    Inhale 1 puff into the lungs daily    Moderate persistent asthma without complication       MUCINEX ALLERGY PO           OMEPRAZOLE PO      Take by mouth every morning        predniSONE 20 MG tablet    DELTASONE    5 tablet    Take 1 tablet (20 mg) by mouth daily    Acute sinusitis with symptoms > 10 days       TYLENOL PO      Take 1,000 mg by mouth as needed for mild pain or fever        * Notice:  This list has 4 medication(s) that are the same as other medications prescribed for you. Read the directions carefully, and ask your doctor or other care provider to review them with you.

## 2018-02-26 ENCOUNTER — OFFICE VISIT (OUTPATIENT)
Dept: URGENT CARE | Facility: URGENT CARE | Age: 48
End: 2018-02-26
Payer: COMMERCIAL

## 2018-02-26 VITALS
BODY MASS INDEX: 29.64 KG/M2 | DIASTOLIC BLOOD PRESSURE: 74 MMHG | TEMPERATURE: 97 F | HEART RATE: 76 BPM | RESPIRATION RATE: 20 BRPM | WEIGHT: 212.5 LBS | SYSTOLIC BLOOD PRESSURE: 124 MMHG

## 2018-02-26 DIAGNOSIS — M62.830 BACK MUSCLE SPASM: ICD-10-CM

## 2018-02-26 DIAGNOSIS — M54.50 ACUTE LOW BACK PAIN WITHOUT SCIATICA, UNSPECIFIED BACK PAIN LATERALITY: Primary | ICD-10-CM

## 2018-02-26 PROCEDURE — 99214 OFFICE O/P EST MOD 30 MIN: CPT | Performed by: PHYSICIAN ASSISTANT

## 2018-02-26 RX ORDER — HYDROCODONE BITARTRATE AND ACETAMINOPHEN 5; 325 MG/1; MG/1
1-2 TABLET ORAL EVERY 8 HOURS PRN
Qty: 15 TABLET | Refills: 0 | Status: SHIPPED | OUTPATIENT
Start: 2018-02-26 | End: 2018-02-26

## 2018-02-26 RX ORDER — IBUPROFEN 800 MG/1
800 TABLET, FILM COATED ORAL EVERY 8 HOURS PRN
Qty: 30 TABLET | Refills: 1 | Status: SHIPPED | OUTPATIENT
Start: 2018-02-26 | End: 2019-02-06

## 2018-02-26 RX ORDER — OXYCODONE AND ACETAMINOPHEN 5; 325 MG/1; MG/1
1-2 TABLET ORAL
Qty: 10 TABLET | Refills: 0 | Status: SHIPPED | OUTPATIENT
Start: 2018-02-26 | End: 2018-06-26

## 2018-02-26 RX ORDER — METHOCARBAMOL 750 MG/1
750 TABLET, FILM COATED ORAL 4 TIMES DAILY PRN
Qty: 30 TABLET | Refills: 0 | Status: SHIPPED | OUTPATIENT
Start: 2018-02-26 | End: 2018-06-26

## 2018-02-26 NOTE — PROGRESS NOTES
SUBJECTIVE  HPI: Enrico Pedroza is a 47 year old male who presents for evaluation of back pain  Symptoms began 1 day(s) ago, have been onset gradual and are worse.  Pain is located in the low back bilateral region, with radiation to does not radiate, and are at worst a 4 on a scale of 1-10.  Recent injury:recent heavy lifting and turning/bending   Personal hx of back pain is recurrent self limited episodes of low back pain in the past.  Pain is exacerbated by: movements and sitting  Pain is relieved by: nothing   Associated sx include: spasms.  Red flag symptoms: negative for fever, chils.    Past Medical History:   Diagnosis Date     Allergic rhinitis, cause unspecified      Intermittent asthma     exercise/allergies/molds     Nephrolithiasis      Allergies   Allergen Reactions     Albuterol Difficulty breathing     Lung pain      No Clinical Screening - See Comments Difficulty breathing     Habanero peppers and ingredients in Orange Crush; SOB, sweating     Social History   Substance Use Topics     Smoking status: Former Smoker     Packs/day: 1.00     Years: 6.00     Quit date: 1/1/1995     Smokeless tobacco: Never Used     Alcohol use 0.5 - 1.0 oz/week     1 - 2 Standard drinks or equivalent per week      Comment: once weekly       ROS:  CONSTITUTIONAL:NEGATIVE for fever, chills, change in weight  INTEGUMENTARY/SKIN: NEGATIVE for worrisome rashes, moles or lesions  ENT/MOUTH: NEGATIVE for ear, mouth and throat problems  RESP:NEGATIVE for significant cough or SOB  CV: NEGATIVE for chest pain, palpitations or peripheral edema  GI: NEGATIVE for nausea, abdominal pain, heartburn, or change in bowel habits  MUSCULOSKELETAL: POSITIVE  for lower back pain, tenderness, spasms  NEURO: NEGATIVE for weakness, dizziness or paresthesias    OBJECTIVE:  /74  Pulse 76  Temp 97  F (36.1  C) (Oral)  Resp 20  Wt 212 lb 8 oz (96.4 kg)  BMI 29.64 kg/m2  Back examination: positive findings: paraspinal muscle spasm,  tenderness to palpation   STRAIGHT leg raise test: negative  RESP: lungs clear to auscultation - no rales, rhonchi or wheezes  CV: regular rates and rhythm, normal S1 S2, no murmur noted  ABDOMEN:  soft, nontender, no HSM or masses and bowel sounds normal  NEURO: Normal strength and tone with no weakness or sensory deficit noted, reflexes normal   Extremities: no peripheral edema or tenderness, peripheral pulses normal  MS:  Positive for lower back pain, tenderness, spasms  SKIN: no suspicious lesions or rashes    ASSESSMENT/IMPRESSION/PLAN:    ICD-10-CM    1. Acute low back pain without sciatica, unspecified back pain laterality M54.5 ibuprofen (ADVIL/MOTRIN) 800 MG tablet     oxyCODONE-acetaminophen (PERCOCET) 5-325 MG per tablet     DISCONTINUED: HYDROcodone-acetaminophen (NORCO) 5-325 MG per tablet   2. Back muscle spasm M62.830 methocarbamol (ROBAXIN) 750 MG tablet     EDUCATION      1.  Continue stretching and strengthening exercises.       2.  Continue prn heat or ice application.    Orders Placed This Encounter     ibuprofen (ADVIL/MOTRIN) 800 MG tablet     methocarbamol (ROBAXIN) 750 MG tablet     DISCONTD: HYDROcodone-acetaminophen (NORCO) 5-325 MG per tablet     oxyCODONE-acetaminophen (PERCOCET) 5-325 MG per tablet

## 2018-02-26 NOTE — MR AVS SNAPSHOT
After Visit Summary   2/26/2018    Enrico Pedroza    MRN: [id number]           Patient Information     Date Of Birth          1970        Visit Information        Provider Department      2/26/2018 2:45 PM Tate Del Real PA-C [facility]        Today's Diagnoses     Acute low back pain without sciatica, unspecified back pain laterality    -  1    Back muscle spasm           Follow-ups after your visit        Who to contact     If you have questions or need follow up information about today's clinic visit or your schedule please contact [facility] directly at [phone].  Normal or non-critical lab and imaging results will be communicated to you by [employer] [place], letter or phone within 4 business days after the clinic has received the results. If you do not hear from us within 7 days, please contact the clinic through [employer] [place] or phone. If you have a critical or abnormal lab result, we will notify you by phone as soon as possible.  Submit refill requests through [employer] or call your pharmacy and they will forward the refill request to us. Please allow 3 business days for your refill to be completed.          Additional Information About Your Visit        MyChart Information     [employer] gives you secure access to your electronic health record. If you see a primary care provider, you can also send messages to your care team and make appointments. If you have questions, please call your primary care clinic.  If you do not have a primary care provider, please call [phone] and they will assist you.        Care EveryWhere ID     This is your Care EveryWhere ID. This could be used by other organizations to access your [place] medical records  [id number]        Your Vitals Were     Pulse Temperature Respirations BMI (Body Mass Index)          76 97  F (36.1  C) (Oral) 20 29.64 kg/m2         Blood Pressure from Last 3 Encounters:   02/26/18  124/74   01/12/18 122/80   11/14/17 117/81    Weight from Last 3 Encounters:   02/26/18 212 lb 8 oz (96.4 kg)   01/12/18 210 lb 1.6 oz (95.3 kg)   11/14/17 209 lb (94.8 kg)              Today, you had the following     No orders found for display         Today's Medication Changes          These changes are accurate as of 2/26/18  3:26 PM.  If you have any questions, ask your nurse or doctor.               Start taking these medicines.        Dose/Directions    ibuprofen 800 MG tablet   Commonly known as:  ADVIL/MOTRIN   Used for:  Acute low back pain without sciatica, unspecified back pain laterality   Started by:  Tate Del Real PA-C        Dose:  800 mg   Take 1 tablet (800 mg) by mouth every 8 hours as needed for moderate pain   Quantity:  30 tablet   Refills:  1       methocarbamol 750 MG tablet   Commonly known as:  ROBAXIN   Used for:  Back muscle spasm   Started by:  Tate Del Real PA-C        Dose:  750 mg   Take 1 tablet (750 mg) by mouth 4 times daily as needed for muscle spasms   Quantity:  30 tablet   Refills:  0       oxyCODONE-acetaminophen 5-325 MG per tablet   Commonly known as:  PERCOCET   Used for:  Acute low back pain without sciatica, unspecified back pain laterality   Started by:  Tate Del Real PA-C        Dose:  1-2 tablet   Take 1-2 tablets by mouth nightly as needed for pain   Quantity:  10 tablet   Refills:  0            Where to get your medicines      These medications were sent to [facility]/pharmacy #[id number] - [place], [place] - [address] [facility]  [address] [place] [id number]     Phone:  [phone]     ibuprofen 800 MG tablet    methocarbamol 750 MG tablet         Some of these will need a paper prescription and others can be bought over the counter.  Ask your nurse if you have questions.     Bring a paper prescription for each of these medications     oxyCODONE-acetaminophen 5-325 MG per tablet                Primary Care Provider Office Phone # Fax #    William DEAN  MD Norma [phone]       [address] [facility] [address]        Equal Access to Services     MARIE VERGARA : Hadlaurie anisa will johanne Bales, fortunato marinjeferson, yandy kaleelada brendan, jerod hsu rolaafua alexander ruth cooney. So [facility] [phone].    ATENCIÓN: Si habla español, tiene a aguilar disposición servicios gratuitos de asistencia lingüística. Llame al [phone].    We comply with applicable federal civil rights laws and [place] laws. We do not discriminate on the basis of race, color, national origin, age, disability, sex, sexual orientation, or gender identity.            Thank you!     Thank you for choosing [facility]  for your care. Our goal is always to provide you with excellent care. Hearing back from our patients is one way we can continue to improve our services. Please take a few minutes to complete the written survey that you may receive in the mail after your visit with us. Thank you!             Your Updated Medication List - Protect others around you: Learn how to safely use, store and throw away your medicines at www.disposemymeds.org.          This list is accurate as of 2/26/18  3:26 PM.  Always use your most recent med list.                   Brand Name Dispense Instructions for use Diagnosis    * azithromycin 250 MG tablet    ZITHROMAX    6 tablet    Two tablets first day, then one tablet daily for four days.    Acute sinusitis with symptoms > 10 days       * azithromycin 250 MG tablet    ZITHROMAX    6 tablet    2 tabs po qd day 1, then 1 tab po qd days 2-5    Productive cough, Chest congestion       beclomethasone 80 MCG/ACT Inhaler    QVAR    1 Inhaler    Inhale 2 puffs into the lungs 2 times daily    Moderate persistent asthma without complication       cetirizine 10 MG tablet    zyrTEC    90 tablet    Take 10 mg by mouth daily as needed        DAYQUIL/NYQUIL COLD/FLU RELIEF OR           fluticasone 220 MCG/ACT Inhaler     FLOVENT HFA    1 Inhaler    Inhale 2 puffs into the lungs 2 times daily    Moderate asthma with exacerbation, unspecified whether persistent       fluticasone 50 MCG/ACT spray    FLONASE    1 Bottle    Spray 1-2 sprays into both nostrils daily    Acute seasonal allergic rhinitis, unspecified trigger       ibuprofen 800 MG tablet    ADVIL/MOTRIN    30 tablet    Take 1 tablet (800 mg) by mouth every 8 hours as needed for moderate pain    Acute low back pain without sciatica, unspecified back pain laterality       * levalbuterol 45 MCG/ACT Inhaler    XOPENEX HFA    1 Inhaler    Inhale 1-2 puffs into the lungs every 6 hours as needed for shortness of breath / dyspnea    Moderate persistent asthma without complication       * levalbuterol 45 MCG/ACT Inhaler    XOPENEX HFA    1 Inhaler    Inhale 2 puffs into the lungs every 6 hours as needed for shortness of breath / dyspnea or wheezing    Mild asthma with exacerbation, unspecified whether persistent       meclizine 25 MG tablet    ANTIVERT    60    1-2 TABLETS  EVERY 6 HOURS AS NEED FOR  DIZZNESS    Labyrinthitis, unspecified       methocarbamol 750 MG tablet    ROBAXIN    30 tablet    Take 1 tablet (750 mg) by mouth 4 times daily as needed for muscle spasms    Back muscle spasm       mometasone 110 MCG/INH inhaler    ASMANEX 30 METERED DOSES    1 Inhaler    Inhale 1 puff into the lungs daily    Moderate persistent asthma without complication       MUCINEX ALLERGY PO           OMEPRAZOLE PO      Take by mouth every morning        oxyCODONE-acetaminophen 5-325 MG per tablet    PERCOCET    10 tablet    Take 1-2 tablets by mouth nightly as needed for pain    Acute low back pain without sciatica, unspecified back pain laterality       predniSONE 20 MG tablet    DELTASONE    5 tablet    Take 1 tablet (20 mg) by mouth daily    Acute sinusitis with symptoms > 10 days       TYLENOL PO      Take 1,000 mg by mouth as needed for mild pain or fever        * Notice:  This list has 4  medication(s) that are the same as other medications prescribed for you. Read the directions carefully, and ask your doctor or other care provider to review them with you.

## 2018-05-02 ENCOUNTER — TRANSFERRED RECORDS (OUTPATIENT)
Dept: HEALTH INFORMATION MANAGEMENT | Facility: CLINIC | Age: 48
End: 2018-05-02

## 2018-06-09 RX ORDER — LEVALBUTEROL TARTRATE 45 UG/1
AEROSOL, METERED ORAL
Qty: 15 INHALER | Refills: 1 | OUTPATIENT
Start: 2018-06-09

## 2018-06-20 DIAGNOSIS — J45.901 MILD ASTHMA WITH EXACERBATION, UNSPECIFIED WHETHER PERSISTENT: ICD-10-CM

## 2018-06-20 RX ORDER — LEVALBUTEROL TARTRATE 45 UG/1
2 AEROSOL, METERED ORAL EVERY 6 HOURS PRN
Qty: 1 INHALER | Refills: 0 | Status: SHIPPED | OUTPATIENT
Start: 2018-06-20 | End: 2018-06-26

## 2018-06-20 NOTE — TELEPHONE ENCOUNTER
Requested Prescriptions   Pending Prescriptions Disp Refills     levalbuterol (XOPENEX HFA) 45 MCG/ACT Inhaler 1 Inhaler 1     Sig: Inhale 2 puffs into the lungs every 6 hours as needed for shortness of breath / dyspnea or wheezing    There is no refill protocol information for this order      Last Written Prescription Date:  04/03/17  Last Fill Quantity: 1inh,  # refills: 11   Last office visit: 4/18/2017 with prescribing provider:  04/18/17   Future Office Visit:  0  ACT Total Scores 4/22/2015 6/6/2016 4/3/2017   ACT TOTAL SCORE 20 - -   ASTHMA ER VISITS 0 = None - -   ASTHMA HOSPITALIZATIONS 0 = None - -   ACT TOTAL SCORE (Goal Greater than or Equal to 20) - 20 20   In the past 12 months, how many times did you visit the emergency room for your asthma without being admitted to the hospital? - 0 0   In the past 12 months, how many times were you hospitalized overnight because of your asthma? - 0 0

## 2018-06-21 NOTE — TELEPHONE ENCOUNTER
Left message on voicemail informing patient of message from provider and to call and schedule a follow up.

## 2018-06-26 ENCOUNTER — OFFICE VISIT (OUTPATIENT)
Dept: INTERNAL MEDICINE | Facility: CLINIC | Age: 48
End: 2018-06-26
Payer: COMMERCIAL

## 2018-06-26 VITALS
OXYGEN SATURATION: 95 % | SYSTOLIC BLOOD PRESSURE: 128 MMHG | DIASTOLIC BLOOD PRESSURE: 80 MMHG | WEIGHT: 216.5 LBS | BODY MASS INDEX: 36.96 KG/M2 | HEART RATE: 96 BPM | HEIGHT: 64 IN | RESPIRATION RATE: 16 BRPM | TEMPERATURE: 98.2 F

## 2018-06-26 DIAGNOSIS — K52.9 CHRONIC DIARRHEA: ICD-10-CM

## 2018-06-26 DIAGNOSIS — Z13.6 CARDIOVASCULAR SCREENING; LDL GOAL LESS THAN 160: ICD-10-CM

## 2018-06-26 DIAGNOSIS — Z13.220 LIPID SCREENING: ICD-10-CM

## 2018-06-26 DIAGNOSIS — J45.40 MODERATE PERSISTENT ASTHMA WITHOUT COMPLICATION: Primary | ICD-10-CM

## 2018-06-26 DIAGNOSIS — Z23 NEED FOR VACCINATION: ICD-10-CM

## 2018-06-26 DIAGNOSIS — Z11.4 SCREENING FOR HIV (HUMAN IMMUNODEFICIENCY VIRUS): ICD-10-CM

## 2018-06-26 PROCEDURE — 99214 OFFICE O/P EST MOD 30 MIN: CPT | Performed by: INTERNAL MEDICINE

## 2018-06-26 PROCEDURE — 90670 PCV13 VACCINE IM: CPT | Performed by: INTERNAL MEDICINE

## 2018-06-26 PROCEDURE — 90471 IMMUNIZATION ADMIN: CPT | Performed by: INTERNAL MEDICINE

## 2018-06-26 RX ORDER — LEVALBUTEROL TARTRATE 45 UG/1
1-2 AEROSOL, METERED ORAL EVERY 6 HOURS PRN
Qty: 2 INHALER | Refills: 11 | Status: SHIPPED | OUTPATIENT
Start: 2018-06-26 | End: 2019-07-02

## 2018-06-26 NOTE — MR AVS SNAPSHOT
After Visit Summary   6/26/2018    Enrico Pedroza    MRN: [id number]           Patient Information     Date Of Birth          1970        Visit Information        Provider Department      6/26/2018 3:20 PM William Green MD [facility]        Today's Diagnoses     Moderate persistent asthma without complication    -  1    Screening for HIV (human immunodeficiency virus)        Lipid screening        CARDIOVASCULAR SCREENING; LDL GOAL LESS THAN 160           Follow-ups after your visit        Future tests that were ordered for you today     Open Future Orders        Priority Expected Expires Ordered    Basic metabolic panel Routine  6/26/2019 6/26/2018    HIV Screening Routine 6/26/2018 6/26/2019 6/26/2018    Lipid panel reflex to direct LDL Fasting Routine 6/26/2018 6/26/2019 6/26/2018            Who to contact     If you have questions or need follow up information about today's clinic visit or your schedule please contact [facility] directly at [phone].  Normal or non-critical lab and imaging results will be communicated to you by eDabbahart, letter or phone within 4 business days after the clinic has received the results. If you do not hear from us within 7 days, please contact the clinic through [employer] or phone. If you have a critical or abnormal lab result, we will notify you by phone as soon as possible.  Submit refill requests through [employer] or call your pharmacy and they will forward the refill request to us. Please allow 3 business days for your refill to be completed.          Additional Information About Your Visit        MyChart Information     [employer] gives you secure access to your electronic health record. If you see a primary care provider, you can also send messages to your care team and make appointments. If you have questions, please call your primary care clinic.  If you do not have a primary care provider, please call  "[phone] and they will assist you.        Care EveryWhere ID     This is your Care EveryWhere ID. This could be used by other organizations to access your [place] medical records  [id number]        Your Vitals Were     Pulse Temperature Respirations Height Pulse Oximetry BMI (Body Mass Index)    96 98.2  F (36.8  C) (Oral) 16 5' 4\" (1.626 m) 95% 37.16 kg/m2       Blood Pressure from Last 3 Encounters:   06/26/18 128/80   02/26/18 124/74   01/12/18 122/80    Weight from Last 3 Encounters:   06/26/18 216 lb 8 oz (98.2 kg)   02/26/18 212 lb 8 oz (96.4 kg)   01/12/18 210 lb 1.6 oz (95.3 kg)                 Where to get your medicines      These medications were sent to [facility]/pharmacy #[id number] [place], [place] - [address] [facility]  [address] [place] [id number]     Phone:  [phone]     beclomethasone 80 MCG/ACT Inhaler    levalbuterol 45 MCG/ACT Inhaler          Primary Care Provider Office Phone # Fax #    William Green [phone]       [address] [place] [id number]        Equal Access to Services     MARIE VERGARA AH: Hadii anisa will hadasho Sodeniseali, waaxda luqadaha, qaybta kaalmada adeegyada, jerod cooney. So [facility] [phone].    ATENCIÓN: Si habla español, tiene a aguilar disposición servicios gratuitos de asistencia lingüística. Llame al [phone].    We comply with applicable federal civil rights laws and [place] laws. We do not discriminate on the basis of race, color, national origin, age, disability, sex, sexual orientation, or gender identity.            Thank you!     Thank you for choosing [facility]  for your care. Our goal is always to provide you with excellent care. Hearing back from our patients is one way we can continue to improve our services. Please take a few minutes to complete the written survey that you may receive in the mail after your visit with us. Thank you!             Your Updated " Medication List - Protect others around you: Learn how to safely use, store and throw away your medicines at www.disposemymeds.org.          This list is accurate as of 6/26/18  3:51 PM.  Always use your most recent med list.                   Brand Name Dispense Instructions for use Diagnosis    beclomethasone 80 MCG/ACT Inhaler    QVAR    1 Inhaler    Inhale 2 puffs into the lungs 2 times daily    Moderate persistent asthma without complication       cetirizine 10 MG tablet    zyrTEC    90 tablet    Take 10 mg by mouth daily as needed        fluticasone 50 MCG/ACT spray    FLONASE    1 Bottle    Spray 1-2 sprays into both nostrils daily    Acute seasonal allergic rhinitis, unspecified trigger       ibuprofen 800 MG tablet    ADVIL/MOTRIN    30 tablet    Take 1 tablet (800 mg) by mouth every 8 hours as needed for moderate pain    Acute low back pain without sciatica, unspecified back pain laterality       levalbuterol 45 MCG/ACT Inhaler    XOPENEX HFA    2 Inhaler    Inhale 1-2 puffs into the lungs every 6 hours as needed for shortness of breath / dyspnea    Moderate persistent asthma without complication       meclizine 25 MG tablet    ANTIVERT    60    1-2 TABLETS  EVERY 6 HOURS AS NEED FOR  DIZZNESS    Labyrinthitis, unspecified       OMEPRAZOLE PO      Take by mouth every morning        TYLENOL PO      Take 1,000 mg by mouth as needed for mild pain or fever

## 2018-06-26 NOTE — PROGRESS NOTES
"  SUBJECTIVE:   Enrico Pedroza is a 47 year old male who presents to clinic today for the following health issues:      Asthma Follow-Up    Was ACT completed today?    Yes    ACT Total Scores 6/26/2018   ACT TOTAL SCORE -   ASTHMA ER VISITS -   ASTHMA HOSPITALIZATIONS -   ACT TOTAL SCORE (Goal Greater than or Equal to 20) 21   In the past 12 months, how many times did you visit the emergency room for your asthma without being admitted to the hospital? 0   In the past 12 months, how many times were you hospitalized overnight because of your asthma? 0       Recent asthma triggers that patient is dealing with: None        Amount of exercise or physical activity: None    Problems taking medications regularly: No    Medication side effects: none    Diet: regular (no restrictions)      Diarrhea   -Chronic in nature.  We started an evaluation last year but he never had the labs done.  He did get a colonoscopy though this was normal without any pathology in the colon itself.    Problem list and histories reviewed & adjusted, as indicated.  Additional history: as documented    Labs reviewed in EPIC    Reviewed and updated as needed this visit by clinical staff  Tobacco  Allergies  Meds  Soc Hx      Reviewed and updated as needed this visit by Provider         ROS:  Constitutional, HEENT, cardiovascular, pulmonary, gi and gu systems are negative, except as otherwise noted.    OBJECTIVE:                                                    /80  Pulse 96  Temp 98.2  F (36.8  C) (Oral)  Resp 16  Ht 5' 4\" (1.626 m)  Wt 216 lb 8 oz (98.2 kg)  SpO2 95%  BMI 37.16 kg/m2  Body mass index is 37.16 kg/(m^2).  GENERAL APPEARANCE: healthy, alert and no distress  HENT: nose and mouth without ulcers or lesions and normal cephalic/atraumatic  NECK: no adenopathy, no asymmetry, masses, or scars and thyroid normal to palpation  RESP: lungs clear to auscultation - no rales, rhonchi or wheezes  CV: regular rates and rhythm, " normal S1 S2, no S3 or S4 and no murmur, click or rub  ABDOMEN: soft, nontender, without hepatosplenomegaly or masses and bowel sounds normal  SKIN: no suspicious lesions or rashes    Diagnostic test results:  none      ASSESSMENT/PLAN:                                                    1. Moderate persistent asthma without complication  Seems overall well controlled.  - levalbuterol (XOPENEX HFA) 45 MCG/ACT Inhaler; Inhale 1-2 puffs into the lungs every 6 hours as needed for shortness of breath / dyspnea  Dispense: 2 Inhaler; Refill: 11  - beclomethasone (QVAR) 80 MCG/ACT Inhaler; Inhale 2 puffs into the lungs 2 times daily  Dispense: 1 Inhaler; Refill: 11    2. Screening for HIV (human immunodeficiency virus)  - HIV Screening; Future    3. Lipid screening  - Lipid panel reflex to direct LDL Fasting; Future    4. CARDIOVASCULAR SCREENING; LDL GOAL LESS THAN 160  - Basic metabolic panel; Future    5.  Diarrhea  Some utility in getting some of the lab work done though this is negative would recommend a FODMAP diet  And possibly GI referral.       William Green MD  [facility]

## 2018-06-27 ASSESSMENT — ASTHMA QUESTIONNAIRES: ACT_TOTALSCORE: 21

## 2018-06-27 NOTE — NURSING NOTE
Screening Questionnaire for Adult Immunization    Are you sick today?   No   Do you have allergies to medications, food, a vaccine component or latex?  Yes   Have you ever had a serious reaction after receiving a vaccination?   No   Do you have a long-term health problem with heart disease, lung disease, asthma, kidney disease, metabolic disease (e.g. diabetes), anemia, or other blood disorder?   Yes   Do you have cancer, leukemia, HIV/AIDS, or any other immune system problem?   No   In the past 3 months, have you taken medications that affect  your immune system, such as prednisone, other steroids, or anticancer drugs; drugs for the treatment of rheumatoid arthritis, Crohn s disease, or psoriasis; or have you had radiation treatments?   No   Have you had a seizure, or a brain or other nervous system problem?   No   During the past year, have you received a transfusion of blood or blood     products, or been given immune (gamma) globulin or antiviral drug?   No   For women: Are you pregnant or is there a chance you could become        pregnant during the next month?   No   Have you received any vaccinations in the past 4 weeks?   No     Immunization questionnaire was positive for at least one answer.  Notified Dr. Green.        Per orders of Dr. Green, injection of Prevnar 13 given by Elizabeth Franks. Patient instructed to remain in clinic for 15 minutes afterwards, and to report any adverse reaction to me immediately.       Screening performed by Elizabeth Franks on 6/26/2018 at 3:44 PM.

## 2018-06-28 DIAGNOSIS — K52.9 CHRONIC DIARRHEA: ICD-10-CM

## 2018-06-28 DIAGNOSIS — Z13.6 CARDIOVASCULAR SCREENING; LDL GOAL LESS THAN 160: ICD-10-CM

## 2018-06-28 DIAGNOSIS — Z13.220 LIPID SCREENING: ICD-10-CM

## 2018-06-28 DIAGNOSIS — Z11.4 SCREENING FOR HIV (HUMAN IMMUNODEFICIENCY VIRUS): ICD-10-CM

## 2018-06-28 LAB
ANION GAP SERPL CALCULATED.3IONS-SCNC: 7 MMOL/L (ref 3–14)
BUN SERPL-MCNC: 7 MG/DL (ref 7–30)
CALCIUM SERPL-MCNC: 9.4 MG/DL (ref 8.5–10.1)
CHLORIDE SERPL-SCNC: 107 MMOL/L (ref 94–109)
CHOLEST SERPL-MCNC: 208 MG/DL
CO2 SERPL-SCNC: 27 MMOL/L (ref 20–32)
CREAT SERPL-MCNC: 0.94 MG/DL (ref 0.66–1.25)
ERYTHROCYTE [DISTWIDTH] IN BLOOD BY AUTOMATED COUNT: 13.4 % (ref 10–15)
ERYTHROCYTE [SEDIMENTATION RATE] IN BLOOD BY WESTERGREN METHOD: 9 MM/H (ref 0–15)
GFR SERPL CREATININE-BSD FRML MDRD: 85 ML/MIN/1.7M2
GLUCOSE SERPL-MCNC: 86 MG/DL (ref 70–99)
HCT VFR BLD AUTO: 45.6 % (ref 40–53)
HDLC SERPL-MCNC: 58 MG/DL
HGB BLD-MCNC: 14.6 G/DL (ref 13.3–17.7)
HIV 1+2 AB+HIV1 P24 AG SERPL QL IA: NONREACTIVE
LDLC SERPL CALC-MCNC: 111 MG/DL
MCH RBC QN AUTO: 30 PG (ref 26.5–33)
MCHC RBC AUTO-ENTMCNC: 32 G/DL (ref 31.5–36.5)
MCV RBC AUTO: 94 FL (ref 78–100)
NONHDLC SERPL-MCNC: 150 MG/DL
PLATELET # BLD AUTO: 339 10E9/L (ref 150–450)
POTASSIUM SERPL-SCNC: 4 MMOL/L (ref 3.4–5.3)
RBC # BLD AUTO: 4.87 10E12/L (ref 4.4–5.9)
SODIUM SERPL-SCNC: 141 MMOL/L (ref 133–144)
TRIGL SERPL-MCNC: 193 MG/DL
TSH SERPL DL<=0.005 MIU/L-ACNC: 1.45 MU/L (ref 0.4–4)
WBC # BLD AUTO: 8.5 10E9/L (ref 4–11)

## 2018-06-28 PROCEDURE — 85652 RBC SED RATE AUTOMATED: CPT | Performed by: INTERNAL MEDICINE

## 2018-06-28 PROCEDURE — 87389 HIV-1 AG W/HIV-1&-2 AB AG IA: CPT | Performed by: INTERNAL MEDICINE

## 2018-06-28 PROCEDURE — 83516 IMMUNOASSAY NONANTIBODY: CPT | Performed by: INTERNAL MEDICINE

## 2018-06-28 PROCEDURE — 84443 ASSAY THYROID STIM HORMONE: CPT | Performed by: INTERNAL MEDICINE

## 2018-06-28 PROCEDURE — 80048 BASIC METABOLIC PNL TOTAL CA: CPT | Performed by: INTERNAL MEDICINE

## 2018-06-28 PROCEDURE — 80061 LIPID PANEL: CPT | Performed by: INTERNAL MEDICINE

## 2018-06-28 PROCEDURE — 83516 IMMUNOASSAY NONANTIBODY: CPT | Mod: 59 | Performed by: INTERNAL MEDICINE

## 2018-06-28 PROCEDURE — 85027 COMPLETE CBC AUTOMATED: CPT | Performed by: INTERNAL MEDICINE

## 2018-06-28 PROCEDURE — 36415 COLL VENOUS BLD VENIPUNCTURE: CPT | Performed by: INTERNAL MEDICINE

## 2018-06-29 LAB
TTG IGA SER-ACNC: 1 U/ML
TTG IGG SER-ACNC: <1 U/ML

## 2018-10-15 ENCOUNTER — OFFICE VISIT (OUTPATIENT)
Dept: URGENT CARE | Facility: URGENT CARE | Age: 48
End: 2018-10-15
Payer: COMMERCIAL

## 2018-10-15 VITALS
BODY MASS INDEX: 36.8 KG/M2 | TEMPERATURE: 97.1 F | OXYGEN SATURATION: 97 % | DIASTOLIC BLOOD PRESSURE: 76 MMHG | SYSTOLIC BLOOD PRESSURE: 123 MMHG | HEART RATE: 94 BPM | WEIGHT: 214.38 LBS

## 2018-10-15 DIAGNOSIS — J30.89 SEASONAL ALLERGIC RHINITIS DUE TO OTHER ALLERGIC TRIGGER: ICD-10-CM

## 2018-10-15 DIAGNOSIS — R09.89 CHEST CONGESTION: Primary | ICD-10-CM

## 2018-10-15 DIAGNOSIS — R09.81 CONGESTION OF PARANASAL SINUS: ICD-10-CM

## 2018-10-15 PROCEDURE — 99214 OFFICE O/P EST MOD 30 MIN: CPT | Performed by: PHYSICIAN ASSISTANT

## 2018-10-15 RX ORDER — FLUTICASONE PROPIONATE 50 MCG
1-2 SPRAY, SUSPENSION (ML) NASAL DAILY
Qty: 1 BOTTLE | Refills: 3
Start: 2018-10-15 | End: 2020-04-16

## 2018-10-15 RX ORDER — LEVALBUTEROL TARTRATE 45 UG/1
2 AEROSOL, METERED ORAL EVERY 6 HOURS PRN
Qty: 1 INHALER | Refills: 3 | Status: SHIPPED | OUTPATIENT
Start: 2018-10-15 | End: 2019-07-02

## 2018-10-15 RX ORDER — AZITHROMYCIN 250 MG/1
TABLET, FILM COATED ORAL
Qty: 6 TABLET | Refills: 0 | Status: SHIPPED | OUTPATIENT
Start: 2018-10-15 | End: 2019-02-06

## 2018-10-15 RX ORDER — BENZONATATE 200 MG/1
200 CAPSULE ORAL 3 TIMES DAILY PRN
Qty: 21 CAPSULE | Refills: 0 | Status: SHIPPED | OUTPATIENT
Start: 2018-10-15 | End: 2019-02-06

## 2018-10-16 NOTE — PROGRESS NOTES
SUBJECTIVE:   Enrico Pedroza is a 48 year old male presenting with a chief complaint of coughing, chest congestion.  Onset of symptoms was 1 week(s) ago.  Course of illness is worsening.    Severity moderate  Current and Associated symptoms: coughing and chest congestion  Treatment measures tried include OTC medications.  Predisposing factors include recent illness.    Past Medical History:   Diagnosis Date     Allergic rhinitis, cause unspecified      Intermittent asthma     exercise/allergies/molds     Nephrolithiasis         Allergies   Allergen Reactions     Albuterol Difficulty breathing     Lung pain      No Clinical Screening - See Comments Difficulty breathing     Habanero peppers and ingredients in Orange Crush; SOB, sweating         Social History   Substance Use Topics     Smoking status: Former Smoker     Packs/day: 1.00     Years: 6.00     Quit date: 1/1/1995     Smokeless tobacco: Never Used     Alcohol use 0.5 - 1.0 oz/week     1 - 2 Standard drinks or equivalent per week      Comment: once weekly       ROS:  CONSTITUTIONAL:NEGATIVE for fever, chills, change in weight  INTEGUMENTARY/SKIN: NEGATIVE for worrisome rashes, moles or lesions  ENT/MOUTH: NEGATIVE for ear, mouth and throat problems  RESP:NEGATIVE for significant cough or SOB  CV: NEGATIVE for chest pain, palpitations or peripheral edema  GI: NEGATIVE for nausea, abdominal pain, heartburn, or change in bowel habits  : POSITIVE for dysuria  MUSCULOSKELETAL: NEGATIVE for significant arthralgias or myalgia  NEURO: NEGATIVE for weakness, dizziness or paresthesias    OBJECTIVE  :/76  Pulse 94  Temp 97.1  F (36.2  C) (Oral)  Wt 214 lb 6 oz (97.2 kg)  SpO2 97%  BMI 36.8 kg/m2  GENERAL APPEARANCE: healthy, alert and no distress  EYES: EOMI,  PERRL, conjunctiva clear  HENT: TM's normal bilaterally and rhinorrhea clear  NECK: supple, nontender, no lymphadenopathy  RESP: lungs clear to auscultation - no rales, rhonchi or wheezes  CV:  regular rates and rhythm, normal S1 S2, no murmur noted  NEURO: Normal strength and tone, sensory exam grossly normal,  normal speech and mentation  SKIN: no suspicious lesions or rashes    ASSESSMENT/PLAN:      ICD-10-CM    1. Chest congestion R09.89 azithromycin (ZITHROMAX) 250 MG tablet     levalbuterol (XOPENEX HFA) 45 MCG/ACT Inhaler   2. Congestion of paranasal sinus R09.81 azithromycin (ZITHROMAX) 250 MG tablet   3. Seasonal allergic rhinitis due to other allergic trigger J30.89 benzonatate (TESSALON) 200 MG capsule     fluticasone (FLONASE) 50 MCG/ACT spray       Orders Placed This Encounter     azithromycin (ZITHROMAX) 250 MG tablet     benzonatate (TESSALON) 200 MG capsule     fluticasone (FLONASE) 50 MCG/ACT spray     levalbuterol (XOPENEX HFA) 45 MCG/ACT Inhaler       Follow up with PCP as needed  See orders in Epic

## 2019-02-06 ENCOUNTER — OFFICE VISIT (OUTPATIENT)
Dept: URGENT CARE | Facility: URGENT CARE | Age: 49
End: 2019-02-06
Payer: COMMERCIAL

## 2019-02-06 ENCOUNTER — TELEPHONE (OUTPATIENT)
Dept: INTERNAL MEDICINE | Facility: CLINIC | Age: 49
End: 2019-02-06

## 2019-02-06 VITALS
HEART RATE: 88 BPM | BODY MASS INDEX: 37.15 KG/M2 | SYSTOLIC BLOOD PRESSURE: 122 MMHG | TEMPERATURE: 96.9 F | DIASTOLIC BLOOD PRESSURE: 76 MMHG | WEIGHT: 216.44 LBS

## 2019-02-06 DIAGNOSIS — M62.830 BACK MUSCLE SPASM: Primary | ICD-10-CM

## 2019-02-06 PROCEDURE — 99214 OFFICE O/P EST MOD 30 MIN: CPT | Performed by: FAMILY MEDICINE

## 2019-02-06 RX ORDER — HYDROCODONE BITARTRATE AND ACETAMINOPHEN 5; 325 MG/1; MG/1
1 TABLET ORAL EVERY 6 HOURS PRN
Qty: 12 TABLET | Refills: 0 | Status: SHIPPED | OUTPATIENT
Start: 2019-02-06 | End: 2019-07-02

## 2019-02-06 RX ORDER — METHOCARBAMOL 750 MG/1
750 TABLET, FILM COATED ORAL 4 TIMES DAILY
Qty: 28 TABLET | Refills: 0 | Status: SHIPPED | OUTPATIENT
Start: 2019-02-06 | End: 2019-07-02

## 2019-02-06 NOTE — TELEPHONE ENCOUNTER
Prior Authorization Retail Medication Request    Medication/Dose:   ICD code (if different than what is on RX):  R09.89  Previously Tried and Failed:    Rationale:      Insurance Name:  [employer]  Insurance ID:  [id number]        Pharmacy Information (if different than what is on RX)  [facility]  Phone:  [phone]

## 2019-02-06 NOTE — PROGRESS NOTES
SUBJECTIVEHPI: Enrico Pedroza is a 48 year old male who presents for evaluation of back pain.  Symptoms began 1 day(s) ago, have been onset acute and are stable.  Pain is located in the low back bilateral region, with radiation to does not radiate.  Recent injury:turning/bending   Personal hx of back pain is recurrent self limited episodes of low back pain in the past.  Pain is exacerbated by: bending and changing position.  Pain is relieved by: none.  Associated sx include: none.  Red flag symptoms: negative.    Past Medical History:   Diagnosis Date     Allergic rhinitis, cause unspecified      Intermittent asthma     exercise/allergies/molds     Nephrolithiasis      Allergies   Allergen Reactions     Albuterol Difficulty breathing     Lung pain      No Clinical Screening - See Comments Difficulty breathing     Habanero peppers and ingredients in Orange Crush; SOB, sweating     Social History     Tobacco Use     Smoking status: Former Smoker     Packs/day: 1.00     Years: 6.00     Pack years: 6.00     Last attempt to quit: 1995     Years since quittin.1     Smokeless tobacco: Never Used   Substance Use Topics     Alcohol use: Yes     Alcohol/week: 0.5 - 1.0 oz     Types: 1 - 2 Standard drinks or equivalent per week     Comment: once weekly       ROS:CONSTITUTIONAL:NEGATIVE for fever, chills, change in weight  INTEGUMENTARY/SKIN: NEGATIVE for worrisome rashes, moles or lesions    OBJECTIVE:  /76   Pulse 88   Temp 96.9  F (36.1  C) (Oral)   Wt 98.2 kg (216 lb 7 oz)   BMI 37.15 kg/m    Back examination: Back symmetric, no curvature. ROM normal. No CVA tenderness., positive findings: limitation of motion - flexion: Moderate and extension: Moderate, paraspinal muscle spasm, tenderness to palpation.  Straight leg raise test: negative.  GENERAL APPEARANCE: healthy, alert and no distress  NEURO: Normal strength and tone with no weakness or sensory deficit noted, reflexes normal   SKIN: no suspicious  lesions or rashes      ICD-10-CM    1. Back muscle spasm M62.830 methocarbamol (ROBAXIN) 750 MG tablet     HYDROcodone-acetaminophen (NORCO) 5-325 MG tablet     Continue prn heat or ice application.    F/U PCP/IM/FP if persists, ED if worse

## 2019-02-07 NOTE — TELEPHONE ENCOUNTER
CENTRAL PRIOR AUTHORIZATION  [phone]    PA Initiation    Medication: levalbuterol - INITAITED 02/07/2019  Insurance Company: [employer] - Phone [phone] Fax [phone]  Pharmacy Filling the Rx: [id number] - [place], [place] - [address]  Filling Pharmacy Phone: [phone]  Filling Pharmacy Fax:    Start Date: 2/7/2019

## 2019-02-07 NOTE — TELEPHONE ENCOUNTER
Prior Authorization Approval    Authorization Effective Date: 1/8/2019  Authorization Expiration Date: 2/7/2020  Medication: levalbuterol - APPROVED 02/07/2019  Approved Dose/Quantity:   Reference #: [id number]   Insurance Company: [employer] - Phone [phone] Fax [phone]  Expected CoPay:       CoPay Card Available:      Foundation Assistance Needed:    Which Pharmacy is filling the prescription (Not needed for infusion/clinic administered): [id number] - [place], [place] - [address]  Pharmacy Notified: Yes  Patient Notified: Yes

## 2019-04-23 ENCOUNTER — OFFICE VISIT (OUTPATIENT)
Dept: URGENT CARE | Facility: URGENT CARE | Age: 49
End: 2019-04-23
Payer: COMMERCIAL

## 2019-04-23 VITALS
OXYGEN SATURATION: 99 % | WEIGHT: 213 LBS | DIASTOLIC BLOOD PRESSURE: 90 MMHG | TEMPERATURE: 97.8 F | HEART RATE: 67 BPM | SYSTOLIC BLOOD PRESSURE: 122 MMHG | BODY MASS INDEX: 36.56 KG/M2 | RESPIRATION RATE: 16 BRPM

## 2019-04-23 DIAGNOSIS — R05.8 PRODUCTIVE COUGH: Primary | ICD-10-CM

## 2019-04-23 DIAGNOSIS — J45.40 MODERATE PERSISTENT ASTHMA WITHOUT COMPLICATION: ICD-10-CM

## 2019-04-23 PROCEDURE — 99214 OFFICE O/P EST MOD 30 MIN: CPT | Performed by: FAMILY MEDICINE

## 2019-04-23 RX ORDER — AZITHROMYCIN 250 MG/1
TABLET, FILM COATED ORAL
Qty: 6 TABLET | Refills: 0 | Status: SHIPPED | OUTPATIENT
Start: 2019-04-23 | End: 2019-07-02

## 2019-04-23 ASSESSMENT — PAIN SCALES - GENERAL: PAINLEVEL: NO PAIN (0)

## 2019-04-23 NOTE — PROGRESS NOTES
SUBJECTIVE: Enrico Pedroza is a 48 year old male presenting with a chief complaint of nasal congestion and cough .  Onset of symptoms was day(s) ago.  Course of illness is worsening.    Severity moderate  Current and Associated symptoms: stuffy nose and cough - non-productive  Treatment measures tried include Tylenol/Ibuprofen and Inhaler (name: alb).  Predisposing factors include HX of asthma.    Past Medical History:   Diagnosis Date     Allergic rhinitis, cause unspecified      Intermittent asthma     exercise/allergies/molds     Nephrolithiasis      Allergies   Allergen Reactions     Albuterol Difficulty breathing     Lung pain      No Clinical Screening - See Comments Difficulty breathing     Habanero peppers and ingredients in Orange Crush; SOB, sweating     Social History     Tobacco Use     Smoking status: Former Smoker     Packs/day: 1.00     Years: 6.00     Pack years: 6.00     Last attempt to quit: 1995     Years since quittin.3     Smokeless tobacco: Never Used   Substance Use Topics     Alcohol use: Yes     Alcohol/week: 0.5 - 1.0 oz     Types: 1 - 2 Standard drinks or equivalent per week     Comment: once weekly       ROS:  SKIN: no rash  GI: no vomiting    OBJECTIVE:  /90 (BP Location: Right arm, Patient Position: Sitting, Cuff Size: Adult Regular)   Pulse 67   Temp 97.8  F (36.6  C) (Oral)   Resp 16   Wt 96.6 kg (213 lb)   SpO2 99%   BMI 36.56 kg/m  GENERAL APPEARANCE: healthy, alert and no distress  EYES: EOMI,  PERRL, conjunctiva clear  HENT: ear canals and TM's normal.  Nose and mouth without ulcers, erythema or lesions  NECK: supple, nontender, no lymphadenopathy  RESP: lungs clear to auscultation - no rales, rhonchi or wheezes  SKIN: no suspicious lesions or rashes      ICD-10-CM    1. Productive cough R05 azithromycin (ZITHROMAX) 250 MG tablet   2. Moderate persistent asthma without complication J45.40 beclomethasone HFA (QVAR REDIHALER) 40 MCG/ACT inhaler        Fluids/Rest, f/u if worse/not any better

## 2019-07-01 NOTE — PROGRESS NOTES
"Subjective     Enrico Pedroza is a 48 year old male who presents to clinic today for the following health issues:    HPI     Asthma Follow-Up    Was ACT completed today?    Yes    ACT Total Scores 7/2/2019   ACT TOTAL SCORE -   ASTHMA ER VISITS -   ASTHMA HOSPITALIZATIONS -   ACT TOTAL SCORE (Goal Greater than or Equal to 20) 21   In the past 12 months, how many times did you visit the emergency room for your asthma without being admitted to the hospital? 0   In the past 12 months, how many times were you hospitalized overnight because of your asthma? 0       How many days per week do you miss taking your asthma controller medication?  I do not have an asthma controller medication    Please describe any recent triggers for your asthma: smoke, animal dander and exercise or sports    Have you had any Emergency Room Visits, Urgent Care Visits, or Hospital Admissions since your last office visit?  No    2.  States is a recurrence of some toenail fungus in his left great toe.  He also notes some pain along the lateral side of the nail.  Wants to know if we would prescribe another course of antifungal medication he got years ago    3.  Several skin lesions which are irritating to him. Present for years but would like them removed    Reviewed and updated as needed this visit by Provider       Review of Systems   ROS COMP: Constitutional, HEENT, cardiovascular, pulmonary, gi and gu systems are negative, except as otherwise noted.      Objective    /74   Pulse 86   Temp 98.3  F (36.8  C) (Temporal)   Resp 18   Ht 1.626 m (5' 4\")   Wt 96.2 kg (212 lb)   SpO2 92%   BMI 36.39 kg/m    Body mass index is 36.39 kg/m .  Physical Exam   GENERAL APPEARANCE: healthy, alert and no distress  HENT: nose and mouth without ulcers or lesions and normal cephalic/atraumatic  NECK: no adenopathy, no asymmetry, masses, or scars and thyroid normal to palpation  RESP: lungs clear to auscultation - no rales, rhonchi or wheezes  CV: " "regular rates and rhythm, normal S1 S2, no S3 or S4 and no murmur, click or rub  SKIN: Small area of discoloration in the left great toe distally and laterally.  Some mild degree of curvature in the lateral border of the nail and some element of an ingrown toenail distally several seborrheic keratoses are noted on the upper extremities    Labs reviewed in Epic        Assessment & Plan     1. Moderate persistent asthma without complication  Well controlled w/steroid controller  - beclomethasone HFA (QVAR REDIHALER) 40 MCG/ACT inhaler; Inhale 1 puff into the lungs 2 times daily  Dispense: 1 Inhaler; Refill: 11  - levalbuterol (XOPENEX HFA) 45 MCG/ACT inhaler; Inhale 1-2 puffs into the lungs every 6 hours as needed for shortness of breath / dyspnea  Dispense: 1 Inhaler; Refill: 11    2. Ingrown toenail of left foot  3. Toenail fungus  Quite small involvement of fungus.  Symptoms seem more related to the ingrown nature of the nail itself.  If continues to bother him we will have him see podiatry    4. Seborrheic keratosis  Observe.  see dermatology prn        BMI:   Estimated body mass index is 36.39 kg/m  as calculated from the following:    Height as of this encounter: 1.626 m (5' 4\").    Weight as of this encounter: 96.2 kg (212 lb).           Return in about 6 months (around 1/2/2020) for Wellness Visit with labs before.    William Green MD  [facility]      "

## 2019-07-02 ENCOUNTER — OFFICE VISIT (OUTPATIENT)
Dept: INTERNAL MEDICINE | Facility: CLINIC | Age: 49
End: 2019-07-02
Payer: COMMERCIAL

## 2019-07-02 VITALS
BODY MASS INDEX: 36.19 KG/M2 | HEIGHT: 64 IN | SYSTOLIC BLOOD PRESSURE: 126 MMHG | OXYGEN SATURATION: 92 % | DIASTOLIC BLOOD PRESSURE: 74 MMHG | TEMPERATURE: 98.3 F | WEIGHT: 212 LBS | RESPIRATION RATE: 18 BRPM | HEART RATE: 86 BPM

## 2019-07-02 DIAGNOSIS — J45.40 MODERATE PERSISTENT ASTHMA WITHOUT COMPLICATION: Primary | ICD-10-CM

## 2019-07-02 DIAGNOSIS — B35.1 TOENAIL FUNGUS: ICD-10-CM

## 2019-07-02 DIAGNOSIS — Z13.6 CARDIOVASCULAR SCREENING; LDL GOAL LESS THAN 160: ICD-10-CM

## 2019-07-02 DIAGNOSIS — Z12.5 SCREENING FOR PROSTATE CANCER: ICD-10-CM

## 2019-07-02 DIAGNOSIS — L82.1 SEBORRHEIC KERATOSIS: ICD-10-CM

## 2019-07-02 DIAGNOSIS — Z13.220 LIPID SCREENING: ICD-10-CM

## 2019-07-02 DIAGNOSIS — L60.0 INGROWN TOENAIL OF LEFT FOOT: ICD-10-CM

## 2019-07-02 PROCEDURE — 99214 OFFICE O/P EST MOD 30 MIN: CPT | Performed by: INTERNAL MEDICINE

## 2019-07-02 RX ORDER — LEVALBUTEROL TARTRATE 45 UG/1
1-2 AEROSOL, METERED ORAL EVERY 6 HOURS PRN
Qty: 1 INHALER | Refills: 11 | Status: SHIPPED | OUTPATIENT
Start: 2019-07-02 | End: 2020-04-16

## 2019-07-02 ASSESSMENT — ASTHMA QUESTIONNAIRES
QUESTION_1 LAST FOUR WEEKS HOW MUCH OF THE TIME DID YOUR ASTHMA KEEP YOU FROM GETTING AS MUCH DONE AT WORK, SCHOOL OR AT HOME: NONE OF THE TIME
QUESTION_3 LAST FOUR WEEKS HOW OFTEN DID YOUR ASTHMA SYMPTOMS (WHEEZING, COUGHING, SHORTNESS OF BREATH, CHEST TIGHTNESS OR PAIN) WAKE YOU UP AT NIGHT OR EARLIER THAN USUAL IN THE MORNING: NOT AT ALL
QUESTION_5 LAST FOUR WEEKS HOW WOULD YOU RATE YOUR ASTHMA CONTROL: WELL CONTROLLED
ACT_TOTALSCORE: 21
QUESTION_2 LAST FOUR WEEKS HOW OFTEN HAVE YOU HAD SHORTNESS OF BREATH: ONCE OR TWICE A WEEK
QUESTION_4 LAST FOUR WEEKS HOW OFTEN HAVE YOU USED YOUR RESCUE INHALER OR NEBULIZER MEDICATION (SUCH AS ALBUTEROL): TWO OR THREE TIMES PER WEEK

## 2019-07-02 ASSESSMENT — MIFFLIN-ST. JEOR: SCORE: 1737.63

## 2019-07-03 ASSESSMENT — ASTHMA QUESTIONNAIRES: ACT_TOTALSCORE: 21

## 2019-07-12 ENCOUNTER — OFFICE VISIT (OUTPATIENT)
Dept: INTERNAL MEDICINE | Facility: CLINIC | Age: 49
End: 2019-07-12
Payer: COMMERCIAL

## 2019-07-12 VITALS
HEART RATE: 78 BPM | DIASTOLIC BLOOD PRESSURE: 68 MMHG | TEMPERATURE: 97.5 F | SYSTOLIC BLOOD PRESSURE: 118 MMHG | WEIGHT: 214.4 LBS | OXYGEN SATURATION: 97 % | RESPIRATION RATE: 16 BRPM | BODY MASS INDEX: 36.8 KG/M2

## 2019-07-12 DIAGNOSIS — K63.89 EPIPLOIC APPENDAGITIS: Primary | ICD-10-CM

## 2019-07-12 PROCEDURE — 99214 OFFICE O/P EST MOD 30 MIN: CPT | Performed by: INTERNAL MEDICINE

## 2019-07-12 RX ORDER — OXYCODONE HYDROCHLORIDE 5 MG/1
2.5-5 TABLET ORAL EVERY 6 HOURS PRN
Qty: 10 TABLET | Refills: 0 | Status: SHIPPED | OUTPATIENT
Start: 2019-07-12 | End: 2019-09-09

## 2019-07-12 NOTE — PROGRESS NOTES
"Subjective     Enrico Pedroza is a 48 year old male who presents to clinic today for the following health issues:    HPI     ED/UC Followup:    Facility:  [facility] ED  Date of visit: 7/9/19  Reason for visit: Abdominal pain - Epiploic appendagitis  Current Status: Feels a little bit better but not much     Seen in the ER for left lower quadrant abdominal pain.  This felt likely related to diverticulitis but CT imaging found on epiploic appendagitis instead.  Pain meds were prescribed the patient has noted slight improvement but continues with discomfort.  No new symptoms since seen      Reviewed and updated as needed this visit by Provider         Review of Systems   ROS COMP: Constitutional, HEENT, cardiovascular, pulmonary, gi and gu systems are negative, except as otherwise noted.      Objective    /68   Pulse 78   Temp 97.5  F (36.4  C) (Temporal)   Resp 16   Wt 97.3 kg (214 lb 6.4 oz)   SpO2 97%   BMI 36.80 kg/m    Body mass index is 36.8 kg/m .  Physical Exam   GENERAL APPEARANCE: alert and no distress  HENT: nose and mouth without ulcers or lesions and normal cephalic/atraumatic  NECK: no adenopathy, no asymmetry, masses, or scars and thyroid normal to palpation  RESP: lungs clear to auscultation - no rales, rhonchi or wheezes  CV: regular rates and rhythm, normal S1 S2, no S3 or S4 and no murmur, click or rub  ABDOMEN: Tender left lower quadrant.  No rebound or guarding.    Labs reviewed in Epic        Assessment & Plan     1. Epiploic appendagitis  Symptomatic pain relief is all that is indicated.  - oxyCODONE (ROXICODONE) 5 MG tablet; Take 0.5-1 tablets (2.5-5 mg) by mouth every 6 hours as needed for pain  Dispense: 10 tablet; Refill: 0     BMI:   Estimated body mass index is 36.8 kg/m  as calculated from the following:    Height as of 7/2/19: 1.626 m (5' 4\").    Weight as of this encounter: 97.3 kg (214 lb 6.4 oz).   Weight management plan: Discussed healthy diet and exercise " guidelines        Work on weight loss  Regular exercise    No follow-ups on file.    William Green MD  [facility]

## 2019-09-09 ENCOUNTER — HOSPITAL ENCOUNTER (OUTPATIENT)
Dept: CT IMAGING | Facility: CLINIC | Age: 49
Discharge: HOME | End: 2019-09-09
Attending: FAMILY MEDICINE | Admitting: FAMILY MEDICINE
Payer: COMMERCIAL

## 2019-09-09 ENCOUNTER — OFFICE VISIT (OUTPATIENT)
Dept: URGENT CARE | Facility: URGENT CARE | Age: 49
End: 2019-09-09
Payer: COMMERCIAL

## 2019-09-09 ENCOUNTER — ANCILLARY PROCEDURE (OUTPATIENT)
Dept: GENERAL RADIOLOGY | Facility: CLINIC | Age: 49
End: 2019-09-09
Attending: FAMILY MEDICINE
Payer: COMMERCIAL

## 2019-09-09 VITALS
HEART RATE: 97 BPM | TEMPERATURE: 97.9 F | WEIGHT: 216 LBS | BODY MASS INDEX: 37.08 KG/M2 | SYSTOLIC BLOOD PRESSURE: 117 MMHG | DIASTOLIC BLOOD PRESSURE: 78 MMHG | OXYGEN SATURATION: 96 % | RESPIRATION RATE: 14 BRPM

## 2019-09-09 DIAGNOSIS — R19.7 DIARRHEA, UNSPECIFIED TYPE: ICD-10-CM

## 2019-09-09 DIAGNOSIS — R10.12 LUQ ABDOMINAL PAIN: ICD-10-CM

## 2019-09-09 DIAGNOSIS — K52.9 CHRONIC DIARRHEA: ICD-10-CM

## 2019-09-09 DIAGNOSIS — K52.9 COLITIS: ICD-10-CM

## 2019-09-09 DIAGNOSIS — R10.12 LUQ ABDOMINAL PAIN: Primary | ICD-10-CM

## 2019-09-09 LAB
ALBUMIN UR-MCNC: NEGATIVE MG/DL
AMYLASE SERPL-CCNC: 64 U/L (ref 30–110)
APPEARANCE UR: CLEAR
BASOPHILS # BLD AUTO: 0 10E9/L (ref 0–0.2)
BASOPHILS NFR BLD AUTO: 0.4 %
BILIRUB UR QL STRIP: NEGATIVE
COLOR UR AUTO: YELLOW
DIFFERENTIAL METHOD BLD: NORMAL
EOSINOPHIL # BLD AUTO: 0.1 10E9/L (ref 0–0.7)
EOSINOPHIL NFR BLD AUTO: 0.7 %
ERYTHROCYTE [DISTWIDTH] IN BLOOD BY AUTOMATED COUNT: 13.2 % (ref 10–15)
GLUCOSE UR STRIP-MCNC: NEGATIVE MG/DL
HCT VFR BLD AUTO: 45.1 % (ref 40–53)
HGB BLD-MCNC: 14.7 G/DL (ref 13.3–17.7)
HGB UR QL STRIP: NEGATIVE
KETONES UR STRIP-MCNC: NEGATIVE MG/DL
LEUKOCYTE ESTERASE UR QL STRIP: NEGATIVE
LIPASE SERPL-CCNC: 68 U/L (ref 73–393)
LYMPHOCYTES # BLD AUTO: 1.5 10E9/L (ref 0.8–5.3)
LYMPHOCYTES NFR BLD AUTO: 22 %
MCH RBC QN AUTO: 29.8 PG (ref 26.5–33)
MCHC RBC AUTO-ENTMCNC: 32.6 G/DL (ref 31.5–36.5)
MCV RBC AUTO: 91 FL (ref 78–100)
MONOCYTES # BLD AUTO: 0.6 10E9/L (ref 0–1.3)
MONOCYTES NFR BLD AUTO: 9.1 %
NEUTROPHILS # BLD AUTO: 4.6 10E9/L (ref 1.6–8.3)
NEUTROPHILS NFR BLD AUTO: 67.8 %
NITRATE UR QL: NEGATIVE
PH UR STRIP: 6 PH (ref 5–7)
PLATELET # BLD AUTO: 353 10E9/L (ref 150–450)
RBC # BLD AUTO: 4.94 10E12/L (ref 4.4–5.9)
RBC #/AREA URNS AUTO: NORMAL /HPF
SOURCE: NORMAL
SP GR UR STRIP: 1.02 (ref 1–1.03)
UROBILINOGEN UR STRIP-ACNC: 0.2 EU/DL (ref 0.2–1)
WBC # BLD AUTO: 6.9 10E9/L (ref 4–11)
WBC #/AREA URNS AUTO: NORMAL /HPF

## 2019-09-09 PROCEDURE — 36415 COLL VENOUS BLD VENIPUNCTURE: CPT | Performed by: FAMILY MEDICINE

## 2019-09-09 PROCEDURE — 99214 OFFICE O/P EST MOD 30 MIN: CPT | Performed by: FAMILY MEDICINE

## 2019-09-09 PROCEDURE — 74177 CT ABD & PELVIS W/CONTRAST: CPT

## 2019-09-09 PROCEDURE — 85025 COMPLETE CBC W/AUTO DIFF WBC: CPT | Performed by: FAMILY MEDICINE

## 2019-09-09 PROCEDURE — 81001 URINALYSIS AUTO W/SCOPE: CPT | Performed by: FAMILY MEDICINE

## 2019-09-09 PROCEDURE — 25000128 H RX IP 250 OP 636: Performed by: FAMILY MEDICINE

## 2019-09-09 PROCEDURE — 83690 ASSAY OF LIPASE: CPT | Performed by: FAMILY MEDICINE

## 2019-09-09 PROCEDURE — 25000125 ZZHC RX 250: Performed by: FAMILY MEDICINE

## 2019-09-09 PROCEDURE — 82150 ASSAY OF AMYLASE: CPT | Performed by: FAMILY MEDICINE

## 2019-09-09 PROCEDURE — 74019 RADEX ABDOMEN 2 VIEWS: CPT

## 2019-09-09 RX ORDER — MECLIZINE HYDROCHLORIDE 25 MG/1
25 TABLET ORAL PRN
COMMUNITY

## 2019-09-09 RX ORDER — IOPAMIDOL 755 MG/ML
106 INJECTION, SOLUTION INTRAVASCULAR ONCE
Status: COMPLETED | OUTPATIENT
Start: 2019-09-09 | End: 2019-09-09

## 2019-09-09 RX ADMIN — SODIUM CHLORIDE 70 ML: 9 INJECTION, SOLUTION INTRAVENOUS at 18:56

## 2019-09-09 RX ADMIN — IOPAMIDOL 106 ML: 755 INJECTION, SOLUTION INTRAVENOUS at 18:55

## 2019-09-10 ENCOUNTER — TELEPHONE (OUTPATIENT)
Dept: URGENT CARE | Facility: URGENT CARE | Age: 49
End: 2019-09-10

## 2019-09-10 NOTE — TELEPHONE ENCOUNTER
Reason for Call:  Request for results:    Name of test or procedure: Ct    Date of test of procedure: 9/9/19    Location of the test or procedure: [facility]    OK to leave the result message on voice mail or with a family member? YES    Phone number Patient can be reached at:  Cell number on file:    Telephone Information:   Mobile [phone]       Additional comments: Pt was wondering why there's nothing yet on [employer].    Call taken on 9/10/2019 at 10:27 AM by MARIA DEL ROSARIO VILLANUEVA

## 2019-09-10 NOTE — PROGRESS NOTES
SUBJECTIVE  HPI: Enrico Pedroza is a 49 year old male  who presents with the CC of abdominal/pelvic pain.   Pain is located in the LUQ area, with radiation to None    The pain is characterized as cramping.    Pain has been present for 2 day(s) and is stable.     EXACERBATING FACTORS: NEGATIVE.   RELIEVING FACTORS: NEGATIVE.    ASSOCIATED SX: chronic diarrhea.     Past Medical History:   Diagnosis Date     Allergic rhinitis, cause unspecified      Intermittent asthma     exercise/allergies/molds     Nephrolithiasis      Allergies   Allergen Reactions     Albuterol Difficulty breathing     Lung pain      No Clinical Screening - See Comments Difficulty breathing     Habanero peppers and ingredients in Orange Crush; SOB, sweating     Social History     Tobacco Use     Smoking status: Former Smoker     Packs/day: 1.00     Years: 6.00     Pack years: 6.00     Last attempt to quit: 1995     Years since quittin.7     Smokeless tobacco: Never Used   Substance Use Topics     Alcohol use: Yes     Alcohol/week: 0.5 - 1.0 oz     Types: 1 - 2 Standard drinks or equivalent per week     Comment: once weekly       ROS:CONSTITUTIONAL:NEGATIVE for fever, chills, change in weight  GI: NEGATIVE for nausea and vomiting  : negative for dysuria, hematuria    EXAMINATION:  /78   Pulse 97   Temp 97.9  F (36.6  C) (Oral)   Resp 14   Wt 98 kg (216 lb)   SpO2 96%   BMI 37.08 kg/m  GENERAL APPEARANCE: healthy, alert and no distress  ABDOMEN: soft, normal bowel sounds, tenderness mild LUQ, no guarding/rigidity/rebound    Study Result     CT ABDOMEN PELVIS WITH CONTRAST   2019 7:02 PM      HISTORY: Left upper quadrant pain. Chronic diarrhea.     TECHNIQUE: 106 mL Isovue-370 IV were administered. After contrast  administration, volumetric helical sections were acquired from the  lung bases to the ischial tuberosities. Coronal images were also  reconstructed. Radiation dose for this scan was reduced using  automated  exposure control, adjustment of the mA and/or kV according  to patient size, or iterative reconstruction technique.     COMPARISON: CT of the abdomen and pelvis without IV contrast performed  7/13/2016.      FINDINGS: Mild diffuse bowel wall thickening in the descending and  sigmoid colon suggests a nonspecific colitis, most likely infectious  or inflammatory in etiology. No bowel obstruction. Unremarkable  appendix. No free fluid in the pelvis. Few scattered sigmoid  diverticula are noted, without convincing evidence for diverticulitis.  The liver, gallbladder, spleen, adrenal glands, pancreas, and kidneys  are unremarkable. No hydronephrosis. No intra-abdominal fluid  collections. No free intraperitoneal air. The visualized lung bases  are clear.                                                                      IMPRESSION:   1. Mild diffuse bowel wall thickening in the descending and sigmoid  colon suggests a nonspecific colitis, most likely infectious or  inflammatory in etiology. Please clinically correlate.  2. Scattered sigmoid diverticulosis, without convincing evidence for  diverticulitis.     CASANDRA HALEY MD         ICD-10-CM    1. LUQ abdominal pain R10.12 CBC with platelets differential     Amylase     Lipase     UA with Microscopic reflex to Culture     XR Abdomen 2 Views     GASTROENTEROLOGY ADULT REF CONSULT ONLY     Enteric Bacteria and Virus Panel by [facility] Stool     Ova and Parasite Exam Routine     CANCELED: CT Abdomen w Contrast   2. Chronic diarrhea K52.9 GASTROENTEROLOGY ADULT REF CONSULT ONLY     Enteric Bacteria and Virus Panel by [facility] Stool     Ova and Parasite Exam Routine     CANCELED: CT Abdomen w Contrast   3. Colitis K52.9 Enteric Bacteria and Virus Panel by [facility] Stool     Ova and Parasite Exam Routine     Pt will be following up with Dr. Sheffield this week, ED if worse

## 2019-09-10 NOTE — TELEPHONE ENCOUNTER
Contacted patient, had already spoken to Dr. Monroe and referred to GI.  Patient states that had contacted GI but patient states that appointment was too far out, was given another name of GI specialist but not able to find.    Patient was recommended to contact Dr. Sheffield for follow up, [phone].     Please contact patient with the specialist name and number    Corey Eller MD, MD  September 10, 2019 5:20 PM

## 2019-09-10 NOTE — TELEPHONE ENCOUNTER
Patient has called 4 times.  Please call patient back with his results at [phone].    Patient was informed that he would be receiving a call later on 09/09/19.

## 2019-09-11 PROCEDURE — 87177 OVA AND PARASITES SMEARS: CPT | Performed by: FAMILY MEDICINE

## 2019-09-11 PROCEDURE — 87506 IADNA-DNA/RNA PROBE TQ 6-11: CPT | Performed by: FAMILY MEDICINE

## 2019-09-11 PROCEDURE — 87209 SMEAR COMPLEX STAIN: CPT | Performed by: FAMILY MEDICINE

## 2019-09-11 NOTE — TELEPHONE ENCOUNTER
Spoke with pt who states he was seen today at a GI specialist office. Phone number below not needed.

## 2019-09-12 DIAGNOSIS — K52.9 COLITIS: ICD-10-CM

## 2019-09-12 DIAGNOSIS — R10.12 LUQ ABDOMINAL PAIN: ICD-10-CM

## 2019-09-12 DIAGNOSIS — K52.9 CHRONIC DIARRHEA: ICD-10-CM

## 2019-09-13 LAB
C COLI+JEJUNI+LARI FUSA STL QL NAA+PROBE: NOT DETECTED
EC STX1 GENE STL QL NAA+PROBE: NOT DETECTED
EC STX2 GENE STL QL NAA+PROBE: NOT DETECTED
ENTERIC PATHOGEN COMMENT: NORMAL
NOROV GI+II ORF1-ORF2 JNC STL QL NAA+PR: NOT DETECTED
O+P STL MICRO: NORMAL
O+P STL MICRO: NORMAL
RVA NSP5 STL QL NAA+PROBE: NOT DETECTED
SALMONELLA SP RPOD STL QL NAA+PROBE: NOT DETECTED
SHIGELLA SP+EIEC IPAH STL QL NAA+PROBE: NOT DETECTED
SPECIMEN SOURCE: NORMAL
V CHOL+PARA RFBL+TRKH+TNAA STL QL NAA+PR: NOT DETECTED
Y ENTERO RECN STL QL NAA+PROBE: NOT DETECTED

## 2019-09-24 ENCOUNTER — TRANSFERRED RECORDS (OUTPATIENT)
Dept: HEALTH INFORMATION MANAGEMENT | Facility: CLINIC | Age: 49
End: 2019-09-24

## 2019-10-02 ENCOUNTER — HEALTH MAINTENANCE LETTER (OUTPATIENT)
Age: 49
End: 2019-10-02

## 2019-10-08 ENCOUNTER — TRANSFERRED RECORDS (OUTPATIENT)
Dept: HEALTH INFORMATION MANAGEMENT | Facility: CLINIC | Age: 49
End: 2019-10-08

## 2019-12-16 ENCOUNTER — OFFICE VISIT (OUTPATIENT)
Dept: PODIATRY | Facility: CLINIC | Age: 49
End: 2019-12-16
Payer: COMMERCIAL

## 2019-12-16 VITALS
WEIGHT: 217 LBS | DIASTOLIC BLOOD PRESSURE: 90 MMHG | SYSTOLIC BLOOD PRESSURE: 122 MMHG | HEIGHT: 71 IN | BODY MASS INDEX: 30.38 KG/M2

## 2019-12-16 DIAGNOSIS — Z79.899 MEDICATION MANAGEMENT: ICD-10-CM

## 2019-12-16 DIAGNOSIS — B35.1 ONYCHOMYCOSIS: ICD-10-CM

## 2019-12-16 DIAGNOSIS — B35.3 TINEA PEDIS OF BOTH FEET: ICD-10-CM

## 2019-12-16 DIAGNOSIS — M79.671 HEEL PAIN, BILATERAL: Primary | ICD-10-CM

## 2019-12-16 DIAGNOSIS — M79.672 HEEL PAIN, BILATERAL: Primary | ICD-10-CM

## 2019-12-16 LAB
ALBUMIN SERPL-MCNC: 3.9 G/DL (ref 3.4–5)
ALP SERPL-CCNC: 65 U/L (ref 40–150)
ALT SERPL W P-5'-P-CCNC: 48 U/L (ref 0–70)
AST SERPL W P-5'-P-CCNC: 24 U/L (ref 0–45)
BILIRUB DIRECT SERPL-MCNC: 0.1 MG/DL (ref 0–0.2)
BILIRUB SERPL-MCNC: 0.4 MG/DL (ref 0.2–1.3)
PROT SERPL-MCNC: 7 G/DL (ref 6.8–8.8)

## 2019-12-16 PROCEDURE — 36415 COLL VENOUS BLD VENIPUNCTURE: CPT | Performed by: PODIATRIST

## 2019-12-16 PROCEDURE — 99203 OFFICE O/P NEW LOW 30 MIN: CPT | Performed by: PODIATRIST

## 2019-12-16 PROCEDURE — 80076 HEPATIC FUNCTION PANEL: CPT | Performed by: PODIATRIST

## 2019-12-16 ASSESSMENT — MIFFLIN-ST. JEOR: SCORE: 1866.44

## 2019-12-16 NOTE — PATIENT INSTRUCTIONS
Thank you for choosing [facility] Podiatry / Foot & Ankle Surgery!    DR. DUMONT'S CLINIC LOCATIONS     MONDAY - [place] WEDNESDAY (AM ONLY) - [place]   [address] [place] [address] [address] [address]   [phone] / [phone] / [phone]       THURSDAY - [place] SCHEDULE SURGERY: [phone]   [address] APPOINTMENTS: [phone]   [place] [address] BILLING QUESTIONS: [phone] / [phone]       ATHLETE'S FOOT (TINEA PEDIS)  It is a rash that is caused by a fungus (most commonly Dermatophytes) in the outer layer of skin on the foot or in the nails. It can occur between the toes or on the instep and heel areas of the feet. Fungus will grow in warm and moist environments. The fungus that causes athlete's foot is contagious and you can get it from touching someone who has it of walking around bare foot around swimming pools, gyms, saunas, communal baths and showers, fitness centers or locker rooms.     SYMPTOMS  The symptoms of athlete's foot are numerous and include; itching, burning or stinging between the toes or on the soles of the feet, blisters, cracked and peeling skin, excessive dryness or excessive scaling on the bottom or sides of the feet, redness and softness with breaking down of the skin, especially between the toes, foot odor and thick, crumbly nails. Older males or people who live in warm humid environments are more prone to get it but it can develop at any age.    TREATMENT OPTIONS  Typically it can be treated with antifungal creams, lotions, ointments, sprays, or gels.  Many are available over the counter, some are prescription. It is important that you use them long enough, typically 4 weeks, to clear the rash. If an infection is particularly bad, your provider may prescribe oral medication. It is important that you follow the directions for any medication carefully to prevent recurrence of the  rash.    HOME TREATMENT  1.  Keep feet clean and dry  2.  Dry between your toes any time your feet become wet  3.  Wear socks that are made of cotton, wool, or fibers designed to wick moisture away  from skin. Nylon, lycra, and spandex tend to trap moisture.  4.  If you have blistering, you may soak your feet in Burow's solution (aluminum acetate is active ingredient. Domeboro is a brand sold at [employer]). This is available over the counter.  5.  Treat shows with antifungal powder  6.  Tea Tree oil may help reduce symptoms but does not cure the rash.    PREVENTION  1.  Change socks or stockings regularly.  2.  Use talcum powder on your feet if they sweat a lot.  3.  Do not borrow shoes.  4.  Let shoes dry out for 24 hours before wearing them again.  5.  Treat shoes with fungal powder  6.  Wear shoes that are well ventilated such as leather shoes or sandals.  Avoid shoes  made of synthetic materials such as vinyl or rubber.  7.  Wear water proof sandals when you are in public areas such as locker rooms, pools, communal baths, showers, saunas, and fitness centers.    FYI: Call or seek medical attention IMMEDIATELY if:  - You develop a fever over 100.4F for more than 24 hrs.  - There is a discharge of pus from infected areas.  - Red streaks develop from the affected areas  - Increase in redness, warmth, pain, swelling, or tenderness in the affected areas.    NAIL FUNGUS / ONYCHOMYCOSIS   Nail fungus is not a hygiene problem and will not likely lead to significant medical   problems. The nails may get thick causing pain and possibly local skin infection.   Treatments include debridement (trimming), oral antifungals, topical antifungals and complete removal of the nail. Most fungal nails are not treated.   Topicals such as tea tree oil can be helpful for surface fungus and may, at best, limit   progression. Over the counter creams (such as Lamisil) can also be used however, their effectiveness is also quite low.   Topical treatment with Pen lac is expensive and often not covered by insurance. Pen lac has an approximate 8% success rate. Topical therapy recommendations is to apply twice a day for at least 3-4 months as it takes 9 months for new nail to grow out.    Experts suggest soaking your feet for 15 to 20 minutes in a mixture of 1 cup vinegar to 4 cups warm water. Be sure to rinse well and pat your feet dry when you're done. You can soak your feet like this daily. But if your skin becomes irritated, try soaking only two to three times a week. Vicks VapoRub, as with vinegar, there have been no controlled clinical trials to assess the effectiveness of Vicks VapoRub on nail fungus, but there have been numerous anecdotal reports that it works. There's no consensus on how often to apply this product, so check with your doctor before using it on your nails.     Oral therapies include Sporanox and Lamisil. Oral therapies are also expensive and not very effective. Side effects such as liver disease are the main concern. Return of fungus is common even if the treatment worked.     Other Tips:  - Penlac nail medication apply daily x 4 months; remove old polish first day of each week  - Antifungal cream/powder (Zeasorb) - apply daily to feet and shoes x 2 months  - Clean shoes with Lysol or in washing machine every few weeks  - Rotate shoe gear; give them 24 hours to dry out between days wearing them  - Clean pair of socks in morning, clean pair in afternoon if your feet sweat  - Shower shoes used in public showers/pools    PLANTAR FASCIITIS  Plantar fasciitis is often referred to as heel spurs or heel pain. Plantar fasciitis is a very common problem that affects people of all foot shapes, age, weight and activity level. Pain may be in the arch or on the weight-bearing surface of the heel. The pain may come on without injury or identifiable cause. Pain is generally present when first getting out of bed in the morning or up from a  seated break.     CAUSES  The plantar fascia is a dense fibrous band of tissue that stretches across the bottom surface of the foot. The fascia helps support the foot muscles and arch. Plantar fasciitis is thought to be caused by mechanical strain or overload. Frequent walking without shoes or wearing unsupportive shoes is thought to cause structural overload and ultimately inflammation of the plantar fascia. Some people have heel spurs that can be seen on x-ray. The heel spur is actually a minor component of plantar fascitis and is largely ignored.       SELF TREATMENT   The easiest solution is to stop walking around your home without shoes. Plantar fasciitis is largely a shoe problem. Shoes are either not being worn often enough or your current shoes are inadequate for your weight, foot structure or activity level. The majority of shoes on the market today are not sufficient to resist development of plantar fasciitis or to promote healing. Assume that your current shoes are inadequate and will need to be replaced. Even high quality shoes wear out with 6 months to one year of frequent use. Weight loss is another option. Losing ten pounds in the next two months may be enough to resolve the problem. Ice applied to the area of pain two to three times per day for ten minutes each session can be very helpful. Warm foot soaks in epsom salts can also relieve pain. This should continue until the problem resolves. Achilles tendon stretching is essential. Stretch multiple times daily to promote healing and to prevent recurrence in the future. Over all stretching of the body is helpful as well such as the calves, thighs and lower back. Normally when one area of the body is tight, other areas are too. Gentle Yoga can be good for this.     Over the counter topical anti inflammatories can be helpful such as biofreeze, bengay, salon pas, ect...  Oral ibuprofen or aleve is recommended as well to try to calm down inflammation.      Night splints can be helpful to gradually stretch the foot at night as a lot of pain is when you get up in the morning. Taking a towel or thera band and stretching the foot back multiple times before you get ou of bed can be beneficial as well.     MEDICAL TREATMENT  Medical treatments often include custom arch supports, cortisone injections, physical therapy, splints to be worn in bed, prescription medications and surgery. The home treatments listed above will be necessary regardless of these advanced medical treatments. Surgery is rarely needed but is very helpful in selected cases.     PROGNOSIS  Plantar fasciitis can last from one day to a lifetime. Some people get intermittent fascitis that is very short-lived. Others suffer daily for years. Excessive body weight, frequent bare foot walking, long hours on the feet, inadequate shoes, predisposing foot structures and excessive activity such as running are all potential issues that lead to chronic and/or recurring plantar fascitis. Having plantar fasciitis means that you are forever prone to this problem and will require modification of some of the above factors. Most people seek treatment within one to four months. Healing usually requires a similar one to four month time frame. Healing time is relative to the amount of effort spent treating the problem.   Plantar fasciitis is highly recurrent. Risk factors often continue, including return to bare foot walking, inadequate shoes, excessive body weight, excessive activities, etc. Your life style and foot structure may predispose you to recurrent plantar fasciitis. A daily prevention regimen can be very helpful. Ongoing use of shoe inserts, careful attention to appropriate shoes, daily Achilles stretching, etc. may prevent recurrence. Prompt attention at the earliest warning signs of heel pain can resolve the problem in as short as a few days.     EXERCISES  Stair Exercise: Step on the stairs with the ball of  your foot and hold your position for at least 15 seconds, then slowly step down with the heels of your foot. You can do this daily and as often as you want.   Picking the Towel: Sit comfortably and then pick the towel up with your toes. You can use any object other than a towel as long as the material can be soft and you can pick it up with your toes.  Rolling the Bottle: Use a small ball or frozen water bottle and then roll it around with your foot.   Flex the Toes: Sit comfortably and then flex your toes by pointing it towards the floor or towards your body. This will relax and flex your foot and exercise your plantar fascia, the calf, and the Achilles tendon. The inability of the foot to stretch often causes the bunching up of the plantar fascia area leading to the pain.  Calf/Achilles Stretching: Lay on you back and raise one foot, then point your toes towards the floor. See photo below:               Hold each stretch for 10 seconds. Stretch 10 times per set, three sets per day. Morning, afternoon and evening. If your heel pain is very severe in the morning, consider doing the first set of stretches before you get out of bed.        [facility] LOCATIONS  [facility]  [address]  [place] [address]  Phone: [phone]  Fax: [phone] [facility]  [address]  [place] [address]  Phone: [phone]   Fax: [phone]   [facility]  [address]  [place] [address]  Phone: [phone]  Fax: [phone] [facility]  [address]  [place] [address]  Phone: [phone]   Fax: [phone]   [facility]   [address]  [place] [address]  Phone: [phone]  Fax: [phone] * Please call any location listed to make an appointment for a casting/fitting. Your referral was sent to their central office and they will all  have the order on file.     WEARING YOUR CUSTOM FOOT ORTHOTICS   Most insurance plans cover one pair of orthotics per year. You must check with your   insurance plan to see what your payment responsibility will be. Please call your   insurance company by calling the number on the back of your insurance card.   Orthotic's are non-refundable and non-returnable.   Orthotics are made of various designs. Some orthotics are covered with material that extends beyond your toes. If your orthotic is of this design, you will likely need to trim the toe end to get a proper fit. The insole from your shoe can be used as a template. Simply overlay the shoe insert on top of the custom orthotic. Align the heel end while tracing the length of the insert onto the custom orthotic. Use a large scissor to trim the toe end until you get a proper fit in the shoe.   The orthotic needs to be pushed as far back in the shoe as possible. The heel portion should not ride forward so as not to irritate your heel.   Orthotics are designed to work with socks. Excessive perspiration will shorten the life span of the orthotics. Remove the orthotic from the shoe frequently for proper drying.   The break-in period lasts for weeks. People new to orthotics will likely experience new aches and pains. The orthotic is forcing your foot into a new position. Arch, foot and leg muscle aches and fatigue are common during these weeks. Minor discomfort can be considered normal break in phenomenon. Start wearing your orthotic around your home your first day. Limited activity for one to two hours is recommended. You can increase one or two additional hours each day provided the aches and pains are subsiding. The degree of discomfort, fatigue and problems will dictate the speed of break in. You may require multiple weeks to work up to full time use.   Do not continue wearing your orthotics if they are creating problems such as blisters or sores. Do not hesitate to  call the clinic to speak with a nurse regarding orthotic   break in, fit, trimming, etc. You may also need to see the doctor if the orthotics are   simply not working out. Adjustments are sometimes made to improve orthotic   function.     Orthotics will only work in certain styles and types of shoes. Orthotics rarely work in dress shoes. Slip-ons, clogs, sandals and heels are particularly troublesome. Specially designed orthotics may be necessary for these types of shoes. Your custom orthotic was designed for activities that require appropriate walking or running shoes. Lace up athletic shoes, walking shoes or work boots should work appropriately. You may need a wider or longer shoe. Shoes with a removable  or insert work best. In general, you want to remove an insert from the shoe before placing the orthotic into the shoe. Shoes without a removable liner may not work as well.     When purchasing new shoes, bring your orthotics along to get a proper fit. Shop at stores that are familiar with orthotics.   Frequent washing of the orthotic may shorten the life span of the top cover. The top cover can be replaced but will generally last one to five years depending on use and foot perspiration.             FYI: BODY WEIGHT AND YOUR FEET  The following information is included in the after visit summary for all patients. Body weight can be a sensitive issue to discuss in clinic, but we think the following information is very important. Although we focus on the feet and ankles, we do support the overall health of our patients.     Many things can cause foot and ankle problems. Foot structure, activity level, foot mechanics and injuries are common causes of pain. One very important issue that often goes unmentioned, is body weight. Extra weight can cause increased stress on muscles, ligaments, bones and tendons. Sometimes just a few extra pounds is all it takes to put one over her/his threshold. Without reducing  that stress, it can be difficult to alleviate pain. As Foot & Ankle specialists, our job is addressing the lower extremity problem and possible causes. Regarding extra body weight, we encourage patients to discuss diet and weight management plans with their primary care doctors. It is this team approach that gives you the best opportunity for pain relief and getting you back on your feet.      [facility] has a Comprehensive Weight Management Program. This program includes counseling, education, non-surgical and surgical approaches to weight loss. If you are interested in learning more either talk to you primary care provider or call [phone].    Enrico to follow up with Primary Care provider regarding elevated blood pressure.

## 2019-12-16 NOTE — PROGRESS NOTES
ASSESSMENT/PLAN:  Encounter Diagnoses   Name Primary?     Heel pain, bilateral Yes     Tinea pedis of both feet      Onychomycosis      Medication management      The patient was educated about the causes and nature of arch and heel pain.  The anatomy and function of the plantar fascia was discussed.  The treatment plan discussed included icing, calf and plantar fascial stretching, avoidance of barefoot walking, wearing sturdy, supportive, stiffer-soled athletic-type shoes, activity modification, and over-the-counter arch supports versus custom orthoses.  A comprehensive After-Visit Summary was provided.     Enrico Pedroza is referred to [facility] for prescription orthoses.      (B35.3) Tinea pedis of both feet  Comment: interdigital  Plan: I suggested a spray, powder or gel topical.      (B35.1) Onychomycosis  Comment: reported prior improvement with oral antifungal medication  Plan: Hepatic panel (Albumin, ALT, AST, Bili, Alk         Phos, TP)   If hepatic panel normal, will prescribe oral terbinafine.     I did explain that results vary and it is often not a long-term fix. He stated understanding.    Body mass index is 30.27 kg/m .    Weight management plan: Patient was referred to their PCP to discuss a diet and exercise plan.      John Kilgore DPM, FACFAS, MS    [facility]le [facility]      ____________________________________________________________________    HPI:         Chief Complaint: bilateral foot pain  Onset of problem: 1 month  Pain/ discomfort is described as:  Ache, throbbing  Ratin/10   Frequency:  daily    The pain is made worse with standing, walking, after driving for extended periods  Previous treatment: no    Secondary concerns:  He asked about athletes' foot around his 4th and 5th toes, as well as treatment for nail fungus. He had some success with prior oral antifungal. The problem has slowly returned.     *  Patient Active  Problem List   Diagnosis     Allergic rhinitis     Lumbago     CARDIOVASCULAR SCREENING; LDL GOAL LESS THAN 160     Elbow pain     Moderate persistent asthma     Ureteral stone     Chronic diarrhea   *  *  Past Surgical History:   Procedure Laterality Date     COMBINED CYSTOSCOPY, RETROGRADES, URETEROSCOPY, INSERT STENT  3/5/2014    Procedure: COMBINED CYSTOSCOPY, RETROGRADES, URETEROSCOPY, INSERT STENT;  CYSTOSCOPY, FLEXIBLE URETEROSCOPY, LEFT STENT PLACEMENT, LEFT RETROGRADES, STONE EXTRACTION;  Surgeon: Kenny Marroquin MD;  Location:  OR     NO HISTORY OF SURGERY     *  *  Current Outpatient Medications   Medication Sig Dispense Refill     Acetaminophen (TYLENOL PO) Take by mouth as needed for mild pain or fever        cetirizine (ZYRTEC) 10 MG tablet Take 10 mg by mouth daily as needed  90 tablet 3     levalbuterol (XOPENEX HFA) 45 MCG/ACT inhaler Inhale 1-2 puffs into the lungs every 6 hours as needed for shortness of breath / dyspnea 1 Inhaler 11     meclizine (ANTIVERT) 25 MG tablet Take 25 mg by mouth as needed for dizziness       omeprazole (PRILOSEC) 20 MG DR capsule Take 1 capsule (20 mg) by mouth daily as needed (heartburn)       raNITIdine HCl (ZANTAC PO) Take by mouth as needed for heartburn       beclomethasone HFA (QVAR REDIHALER) 40 MCG/ACT inhaler Inhale 1 puff into the lungs 2 times daily (Patient not taking: Reported on 12/16/2019) 1 Inhaler 11     fluticasone (FLONASE) 50 MCG/ACT spray Spray 1-2 sprays into both nostrils daily (Patient not taking: Reported on 9/9/2019) 1 Bottle 3       ROS:     A 10-point review of systems was performed and is positive for that noted above in the HPI and as seen below.  All other areas are negative.     Numbness in feet?  no   Calf pain with walking? no  Recent foot/ankle injury? no  Weight change over past 12 months? no  Self perception as overweight? yes  Recent flu-like symptoms? no  Joint pain other than feet ? knees    Social History: Employment:  ;  " Exercise/Physical activity:  \"low\";  Tobacco use:  no  Social History     Socioeconomic History     Marital status:      Spouse name: Not on file     Number of children: Not on file     Years of education: Not on file     Highest education level: Not on file   Occupational History     Not on file   Social Needs     Financial resource strain: Not on file     Food insecurity:     Worry: Not on file     Inability: Not on file     Transportation needs:     Medical: Not on file     Non-medical: Not on file   Tobacco Use     Smoking status: Former Smoker     Packs/day: 1.00     Years: 6.00     Pack years: 6.00     Last attempt to quit: 1995     Years since quittin.9     Smokeless tobacco: Never Used   Substance and Sexual Activity     Alcohol use: Yes     Alcohol/week: 0.8 - 1.7 standard drinks     Types: 1 - 2 Standard drinks or equivalent per week     Comment: once weekly     Drug use: No     Sexual activity: Yes     Partners: Female   Lifestyle     Physical activity:     Days per week: Not on file     Minutes per session: Not on file     Stress: Not on file   Relationships     Social connections:     Talks on phone: Not on file     Gets together: Not on file     Attends Sabianism service: Not on file     Active member of club or organization: Not on file     Attends meetings of clubs or organizations: Not on file     Relationship status: Not on file     Intimate partner violence:     Fear of current or ex partner: Not on file     Emotionally abused: Not on file     Physically abused: Not on file     Forced sexual activity: Not on file   Other Topics Concern     Parent/sibling w/ CABG, MI or angioplasty before 65F 55M? Not Asked   Social History Narrative     Not on file       Family history:  Family History   Problem Relation Age of Onset     Diabetes Other         cousin and grandfather     C.A.D. Paternal Grandfather          MI     Breast Cancer Sister      Hypertension Mother      Breast " "Cancer Mother      Coronary Artery Disease Father      Prostate Cancer Father        Rheumatoid arthritis:  no  Foot Problems: no  Diabetes: cousin      EXAM:    Vitals: BP (!) 122/90   Ht 1.803 m (5' 11\")   Wt 98.4 kg (217 lb)   BMI 30.27 kg/m    BMI: Body mass index is 30.27 kg/m .  Height: 5' 11\"    Constitutional/ general:  Pt is in no apparent distress, appears well-nourished.  Cooperative with history and physical exam.     Vascular:  Pedal pulses are palpable bilaterally for both the DP and PT arteries.  CFT < 3 sec.  No edema.  Pedal hair growth noted.     Neuro:  Alert and oriented x 3. Coordinated gait.  Light touch sensation is intact to the L4, L5, S1 distributions. No obvious deficits.  No evidence of neurological-based weakness, spasticity, or contracture in the lower extremities.     Derm: Normal texture and turgor.  No erythema, ecchymosis, or cyanosis.  No open lesions.   Several nails are mildly thickened and discolored. This involves the right hallux and 2nd toe.     Musculoskeletal:    Lower extremity muscle strength is normal.  Patient is ambulatory without an assistive device or brace .  No gross deformities.  Pain on palpation to the plantar medial aspect of the bilateral heel.  No significant pain with side-to-side compression of the heel.  No nodularity noted.      "

## 2019-12-16 NOTE — LETTER
2019         RE: Enrico Pedroza  [address] [facility] [address]        Dear Colleague,    Thank you for referring your patient, Enrico Pedroza, to the [facility]. Please see a copy of my visit note below.      ASSESSMENT/PLAN:  Encounter Diagnoses   Name Primary?     Heel pain, bilateral Yes     Tinea pedis of both feet      Onychomycosis      Medication management      The patient was educated about the causes and nature of arch and heel pain.  The anatomy and function of the plantar fascia was discussed.  The treatment plan discussed included icing, calf and plantar fascial stretching, avoidance of barefoot walking, wearing sturdy, supportive, stiffer-soled athletic-type shoes, activity modification, and over-the-counter arch supports versus custom orthoses.  A comprehensive After-Visit Summary was provided.     Enrico Pedroza is referred to [facility] for prescription orthoses.      (B35.3) Tinea pedis of both feet  Comment: interdigital  Plan: I suggested a spray, powder or gel topical.      (B35.1) Onychomycosis  Comment: reported prior improvement with oral antifungal medication  Plan: Hepatic panel (Albumin, ALT, AST, Bili, Alk         Phos, TP)   If hepatic panel normal, will prescribe oral terbinafine.     I did explain that results vary and it is often not a long-term fix. He stated understanding.    Body mass index is 30.27 kg/m .    Weight management plan: Patient was referred to their PCP to discuss a diet and exercise plan.      John Kilgore, JIM, FACFAS, MS    [facility]      ____________________________________________________________________    HPI:         Chief Complaint: bilateral foot pain  Onset of problem: 1 month  Pain/ discomfort is described as:  Ache, throbbing  Ratin/10   Frequency:  daily    The pain is made worse with standing, walking, after driving for  extended periods  Previous treatment: no    Secondary concerns:  He asked about athletes' foot around his 4th and 5th toes, as well as treatment for nail fungus. He had some success with prior oral antifungal. The problem has slowly returned.     *  Patient Active Problem List   Diagnosis     Allergic rhinitis     Lumbago     CARDIOVASCULAR SCREENING; LDL GOAL LESS THAN 160     Elbow pain     Moderate persistent asthma     Ureteral stone     Chronic diarrhea   *  *  Past Surgical History:   Procedure Laterality Date     COMBINED CYSTOSCOPY, RETROGRADES, URETEROSCOPY, INSERT STENT  3/5/2014    Procedure: COMBINED CYSTOSCOPY, RETROGRADES, URETEROSCOPY, INSERT STENT;  CYSTOSCOPY, FLEXIBLE URETEROSCOPY, LEFT STENT PLACEMENT, LEFT RETROGRADES, STONE EXTRACTION;  Surgeon: Kenny Marroquin MD;  Location:  OR     NO HISTORY OF SURGERY     *  *  Current Outpatient Medications   Medication Sig Dispense Refill     Acetaminophen (TYLENOL PO) Take by mouth as needed for mild pain or fever        cetirizine (ZYRTEC) 10 MG tablet Take 10 mg by mouth daily as needed  90 tablet 3     levalbuterol (XOPENEX HFA) 45 MCG/ACT inhaler Inhale 1-2 puffs into the lungs every 6 hours as needed for shortness of breath / dyspnea 1 Inhaler 11     meclizine (ANTIVERT) 25 MG tablet Take 25 mg by mouth as needed for dizziness       omeprazole (PRILOSEC) 20 MG DR capsule Take 1 capsule (20 mg) by mouth daily as needed (heartburn)       raNITIdine HCl (ZANTAC PO) Take by mouth as needed for heartburn       beclomethasone HFA (QVAR REDIHALER) 40 MCG/ACT inhaler Inhale 1 puff into the lungs 2 times daily (Patient not taking: Reported on 12/16/2019) 1 Inhaler 11     fluticasone (FLONASE) 50 MCG/ACT spray Spray 1-2 sprays into both nostrils daily (Patient not taking: Reported on 9/9/2019) 1 Bottle 3       ROS:     A 10-point review of systems was performed and is positive for that noted above in the HPI and as seen below.  All other areas are  "negative.     Numbness in feet?  no   Calf pain with walking? no  Recent foot/ankle injury? no  Weight change over past 12 months? no  Self perception as overweight? yes  Recent flu-like symptoms? no  Joint pain other than feet ? knees    Social History: Employment:  ;  Exercise/Physical activity:  \"low\";  Tobacco use:  no  Social History     Socioeconomic History     Marital status:      Spouse name: Not on file     Number of children: Not on file     Years of education: Not on file     Highest education level: Not on file   Occupational History     Not on file   Social Needs     Financial resource strain: Not on file     Food insecurity:     Worry: Not on file     Inability: Not on file     Transportation needs:     Medical: Not on file     Non-medical: Not on file   Tobacco Use     Smoking status: Former Smoker     Packs/day: 1.00     Years: 6.00     Pack years: 6.00     Last attempt to quit: 1995     Years since quittin.9     Smokeless tobacco: Never Used   Substance and Sexual Activity     Alcohol use: Yes     Alcohol/week: 0.8 - 1.7 standard drinks     Types: 1 - 2 Standard drinks or equivalent per week     Comment: once weekly     Drug use: No     Sexual activity: Yes     Partners: Female   Lifestyle     Physical activity:     Days per week: Not on file     Minutes per session: Not on file     Stress: Not on file   Relationships     Social connections:     Talks on phone: Not on file     Gets together: Not on file     Attends Hoahaoism service: Not on file     Active member of club or organization: Not on file     Attends meetings of clubs or organizations: Not on file     Relationship status: Not on file     Intimate partner violence:     Fear of current or ex partner: Not on file     Emotionally abused: Not on file     Physically abused: Not on file     Forced sexual activity: Not on file   Other Topics Concern     Parent/sibling w/ CABG, MI or angioplasty before 65F 55M? Not Asked   Social " "History Narrative     Not on file       Family history:  Family History   Problem Relation Age of Onset     Diabetes Other         cousin and grandfather     C.A.D. Paternal Grandfather          MI     Breast Cancer Sister      Hypertension Mother      Breast Cancer Mother      Coronary Artery Disease Father      Prostate Cancer Father        Rheumatoid arthritis:  no  Foot Problems: no  Diabetes: cousin      EXAM:    Vitals: BP (!) 122/90   Ht 1.803 m (5' 11\")   Wt 98.4 kg (217 lb)   BMI 30.27 kg/m     BMI: Body mass index is 30.27 kg/m .  Height: 5' 11\"    Constitutional/ general:  Pt is in no apparent distress, appears well-nourished.  Cooperative with history and physical exam.     Vascular:  Pedal pulses are palpable bilaterally for both the DP and PT arteries.  CFT < 3 sec.  No edema.  Pedal hair growth noted.     Neuro:  Alert and oriented x 3. Coordinated gait.  Light touch sensation is intact to the L4, L5, S1 distributions. No obvious deficits.  No evidence of neurological-based weakness, spasticity, or contracture in the lower extremities.     Derm: Normal texture and turgor.  No erythema, ecchymosis, or cyanosis.  No open lesions.   Several nails are mildly thickened and discolored. This involves the right hallux and 2nd toe.     Musculoskeletal:    Lower extremity muscle strength is normal.  Patient is ambulatory without an assistive device or brace .  No gross deformities.  Pain on palpation to the plantar medial aspect of the bilateral heel.  No significant pain with side-to-side compression of the heel.  No nodularity noted.          Again, thank you for allowing me to participate in the care of your patient.        Sincerely,        John Kilgore, JIM    "

## 2019-12-17 RX ORDER — TERBINAFINE HYDROCHLORIDE 250 MG/1
250 TABLET ORAL DAILY
Qty: 90 TABLET | Refills: 0 | Status: SHIPPED | OUTPATIENT
Start: 2019-12-17 | End: 2020-04-16

## 2020-03-04 ENCOUNTER — OFFICE VISIT (OUTPATIENT)
Dept: URGENT CARE | Facility: URGENT CARE | Age: 50
End: 2020-03-04
Payer: COMMERCIAL

## 2020-03-04 VITALS
OXYGEN SATURATION: 97 % | TEMPERATURE: 97.5 F | RESPIRATION RATE: 20 BRPM | SYSTOLIC BLOOD PRESSURE: 122 MMHG | HEART RATE: 104 BPM | DIASTOLIC BLOOD PRESSURE: 72 MMHG

## 2020-03-04 DIAGNOSIS — J45.41 MODERATE PERSISTENT ASTHMA WITH ACUTE EXACERBATION: Primary | ICD-10-CM

## 2020-03-04 PROCEDURE — 99214 OFFICE O/P EST MOD 30 MIN: CPT | Performed by: FAMILY MEDICINE

## 2020-03-04 RX ORDER — LEVALBUTEROL TARTRATE 45 UG/1
2 AEROSOL, METERED ORAL EVERY 4 HOURS PRN
Qty: 1 INHALER | Refills: 0 | Status: SHIPPED | OUTPATIENT
Start: 2020-03-04 | End: 2020-03-18

## 2020-03-04 RX ORDER — AZITHROMYCIN 250 MG/1
TABLET, FILM COATED ORAL
Qty: 6 TABLET | Refills: 0 | Status: SHIPPED | OUTPATIENT
Start: 2020-03-04 | End: 2020-04-16

## 2020-03-04 NOTE — LETTER
[facility][1]  [address] [address]  [phone]      March 4, 2020    RE:  Enrico Pedroza                                                                                                                                                       [address] [facility] [address]            To whom it may concern:    Enrico Pedroza is under my professional care for Moderate persistent asthma with acute exacerbation.           Sincerely,        Eloise García MD    [facility]

## 2020-03-05 NOTE — PATIENT INSTRUCTIONS
"  Patient Education     Asthma (Adult)  Asthma is a disease where the medium and  small air passages within the lung go into spasm and restrict the flow of air. Inflammation and swelling of the airways cause further blockage. During an acute asthma attack, these factors cause trouble breathing, wheezing, cough and chest tightness.    An asthma attack can be triggered by many things. Common triggers include infections such as the common cold, bronchitis, and pneumonia. Irritants such as smoke or pollutants in the air, very cold air, emotional upset, and exercise can also trigger an attack. In many adults with asthma, allergies to dust, mold, pollen and animal dander can cause an asthma attack. Skipping doses of daily asthma medicine can also bring on an asthma attack.  Asthma can be controlled using the proper medicines prescribed by your healthcare provider and avoiding exposure to known triggers including allergens and irritants.  Home care    Take prescribed medicine exactly at the times advised. If you need medicine such as from a hand held inhaler or aerosol breathing machine more than every 4 hours, contact your healthcare provider or seek immediate medical attention. If prescribed an antibiotic or prednisone, take all of the medicine as prescribed, even if you are feeling better after a few days.    Don't smoke. Avoid being exposed to the smoke of others.    Some people with asthma have worsening of their symptoms when they take aspirin and non-steroidal or fever-reducing medicines like ibuprofen and naproxen. Talk to your healthcare provider if you think this may apply to you.  Follow-up care  Follow up with your healthcare provider, or as advised. Always bring all of your current medicines to any appointments with your healthcare provider. Also bring a complete list of medicines even those not taken for asthma. If you don't already have one, talk to your healthcare provider about developing your own \"Asthma " "Action Plan.\"  A pneumococcal (pneumonia) vaccine and yearly flu shot (every fall) are recommended. Ask your doctor about this.  When to seek medical advice  Call your healthcare provider right away if any of these occur:     Increased wheezing or shortness of breath    Need to use your inhalers more often than usual without relief    Fever of 100.4 F (38 C) or higher, or as directed by your healthcare provider    Coughing up lots of dark-colored or bloody sputum (mucus)    Chest pain with each breath    If you use a peak flow meter as part of an Asthma Action Plan, and you are still in the yellow zone (50% to 80%) 15 minutes after using inhaler medicine.  Call 911  Call 911 if any of the following occur    Trouble walking or talking because of shortness of breath    If you use a peak flow meter as part of an Asthma Action Plan and you are still in the red zone (less than 50%) 15 minutes after using inhaler medicine    Lips or fingernails turning gray or blue  Date Last Reviewed: 5/1/2017 2000-2019 [employer]. [address] [place] [address], [place] [address]. All rights reserved. This information is not intended as a substitute for professional medical care. Always follow your healthcare professional's instructions.           "

## 2020-03-12 ENCOUNTER — TELEPHONE (OUTPATIENT)
Dept: INTERNAL MEDICINE | Facility: CLINIC | Age: 50
End: 2020-03-12

## 2020-03-12 ENCOUNTER — E-VISIT (OUTPATIENT)
Dept: INTERNAL MEDICINE | Facility: CLINIC | Age: 50
End: 2020-03-12
Payer: COMMERCIAL

## 2020-03-12 DIAGNOSIS — J45.41 MODERATE PERSISTENT ASTHMA WITH ACUTE EXACERBATION: Primary | ICD-10-CM

## 2020-03-12 PROCEDURE — 99421 OL DIG E/M SVC 5-10 MIN: CPT | Performed by: INTERNAL MEDICINE

## 2020-03-12 RX ORDER — PREDNISONE 20 MG/1
20 TABLET ORAL 2 TIMES DAILY
Qty: 12 TABLET | Refills: 0 | Status: SHIPPED | OUTPATIENT
Start: 2020-03-12 | End: 2020-04-16

## 2020-03-12 NOTE — PATIENT INSTRUCTIONS
Thank you for choosing us for your care. I have placed an order for a prescription so that you can start treatment. View your full visit summary for details by clicking on the link below. Your pharmacist will able to address any questions you may have about the medication.     If you're not feeling better within 5-7 days, please schedule an appointment.  You can schedule an appointment right here in [facility], or call [phone]  If the visit is for the same symptoms as your e-visit, we'll refund the cost of your e-visit if seen within seven days.      Asthma (Adult)  Asthma is a disease where the medium and  small air passages within the lung go into spasm and restrict the flow of air. Inflammation and swelling of the airways cause further blockage. During an acute asthma attack, these factors cause trouble breathing, wheezing, cough and chest tightness.    An asthma attack can be triggered by many things. Common triggers include infections such as the common cold, bronchitis, and pneumonia. Irritants such as smoke or pollutants in the air, very cold air, emotional upset, and exercise can also trigger an attack. In many adults with asthma, allergies to dust, mold, pollen and animal dander can cause an asthma attack. Skipping doses of daily asthma medicine can also bring on an asthma attack.  Asthma can be controlled using the proper medicines prescribed by your healthcare provider and avoiding exposure to known triggers including allergens and irritants.  Home care    Take prescribed medicine exactly at the times advised. If you need medicine such as from a hand held inhaler or aerosol breathing machine more than every 4 hours, contact your healthcare provider or seek immediate medical attention. If prescribed an antibiotic or prednisone, take all of the medicine as prescribed, even if you are feeling better after a few days.    Don't smoke. Avoid being exposed to the smoke of others.    Some people with asthma  "have worsening of their symptoms when they take aspirin and non-steroidal or fever-reducing medicines like ibuprofen and naproxen. Talk to your healthcare provider if you think this may apply to you.  Follow-up care  Follow up with your healthcare provider, or as advised. Always bring all of your current medicines to any appointments with your healthcare provider. Also bring a complete list of medicines even those not taken for asthma. If you don't already have one, talk to your healthcare provider about developing your own \"Asthma Action Plan.\"  A pneumococcal (pneumonia) vaccine and yearly flu shot (every fall) are recommended. Ask your doctor about this.  When to seek medical advice  Call your healthcare provider right away if any of these occur:     Increased wheezing or shortness of breath    Need to use your inhalers more often than usual without relief    Fever of 100.4 F (38 C) or higher, or as directed by your healthcare provider    Coughing up lots of dark-colored or bloody sputum (mucus)    Chest pain with each breath    If you use a peak flow meter as part of an Asthma Action Plan, and you are still in the yellow zone (50% to 80%) 15 minutes after using inhaler medicine.  Call 911  Call 911 if any of the following occur    Trouble walking or talking because of shortness of breath    If you use a peak flow meter as part of an Asthma Action Plan and you are still in the red zone (less than 50%) 15 minutes after using inhaler medicine    Lips or fingernails turning gray or blue  Date Last Reviewed: 5/1/2017 2000-2019 [employer]. [address] [place] [address], [place] [id number]. All rights reserved. This information is not intended as a substitute for professional medical care. Always follow your healthcare professional's instructions.        "

## 2020-03-18 DIAGNOSIS — J45.41 MODERATE PERSISTENT ASTHMA WITH ACUTE EXACERBATION: ICD-10-CM

## 2020-03-18 RX ORDER — LEVALBUTEROL TARTRATE 45 UG/1
AEROSOL, METERED ORAL
Qty: 15 G | Refills: 0 | Status: SHIPPED | OUTPATIENT
Start: 2020-03-18 | End: 2020-08-05

## 2020-03-25 ENCOUNTER — E-VISIT (OUTPATIENT)
Dept: INTERNAL MEDICINE | Facility: CLINIC | Age: 50
End: 2020-03-25
Payer: COMMERCIAL

## 2020-03-25 DIAGNOSIS — R36.1 HEMATOSPERMIA: Primary | ICD-10-CM

## 2020-03-25 PROCEDURE — 99421 OL DIG E/M SVC 5-10 MIN: CPT | Performed by: INTERNAL MEDICINE

## 2020-03-26 DIAGNOSIS — Z12.5 SCREENING FOR PROSTATE CANCER: ICD-10-CM

## 2020-03-26 DIAGNOSIS — Z13.220 LIPID SCREENING: ICD-10-CM

## 2020-03-26 DIAGNOSIS — R36.1 HEMATOSPERMIA: ICD-10-CM

## 2020-03-26 DIAGNOSIS — Z13.6 CARDIOVASCULAR SCREENING; LDL GOAL LESS THAN 160: ICD-10-CM

## 2020-03-26 LAB
ALBUMIN UR-MCNC: NEGATIVE MG/DL
ANION GAP SERPL CALCULATED.3IONS-SCNC: 5 MMOL/L (ref 3–14)
APPEARANCE UR: CLEAR
BILIRUB UR QL STRIP: NEGATIVE
BUN SERPL-MCNC: 14 MG/DL (ref 7–30)
CALCIUM SERPL-MCNC: 9.4 MG/DL (ref 8.5–10.1)
CHLORIDE SERPL-SCNC: 106 MMOL/L (ref 94–109)
CHOLEST SERPL-MCNC: 254 MG/DL
CO2 SERPL-SCNC: 28 MMOL/L (ref 20–32)
COLOR UR AUTO: YELLOW
CREAT SERPL-MCNC: 0.91 MG/DL (ref 0.66–1.25)
GFR SERPL CREATININE-BSD FRML MDRD: >90 ML/MIN/{1.73_M2}
GLUCOSE SERPL-MCNC: 93 MG/DL (ref 70–99)
GLUCOSE UR STRIP-MCNC: NEGATIVE MG/DL
HDLC SERPL-MCNC: 58 MG/DL
HGB UR QL STRIP: NEGATIVE
KETONES UR STRIP-MCNC: NEGATIVE MG/DL
LDLC SERPL CALC-MCNC: 118 MG/DL
LEUKOCYTE ESTERASE UR QL STRIP: NEGATIVE
NITRATE UR QL: NEGATIVE
NONHDLC SERPL-MCNC: 196 MG/DL
PH UR STRIP: 5 PH (ref 5–7)
POTASSIUM SERPL-SCNC: 4.2 MMOL/L (ref 3.4–5.3)
PSA SERPL-ACNC: 1.47 UG/L (ref 0–4)
SODIUM SERPL-SCNC: 139 MMOL/L (ref 133–144)
SOURCE: NORMAL
SP GR UR STRIP: >1.03 (ref 1–1.03)
TRIGL SERPL-MCNC: 391 MG/DL
UROBILINOGEN UR STRIP-ACNC: 0.2 EU/DL (ref 0.2–1)

## 2020-03-26 PROCEDURE — 81003 URINALYSIS AUTO W/O SCOPE: CPT | Performed by: INTERNAL MEDICINE

## 2020-03-26 PROCEDURE — 36415 COLL VENOUS BLD VENIPUNCTURE: CPT | Performed by: INTERNAL MEDICINE

## 2020-03-26 PROCEDURE — 80048 BASIC METABOLIC PNL TOTAL CA: CPT | Performed by: INTERNAL MEDICINE

## 2020-03-26 PROCEDURE — 80061 LIPID PANEL: CPT | Performed by: INTERNAL MEDICINE

## 2020-03-26 PROCEDURE — G0103 PSA SCREENING: HCPCS | Performed by: INTERNAL MEDICINE

## 2020-04-01 ENCOUNTER — TELEPHONE (OUTPATIENT)
Dept: UROLOGY | Facility: CLINIC | Age: 50
End: 2020-04-01

## 2020-04-01 NOTE — TELEPHONE ENCOUNTER
[facility] Call Center    Phone Message    May a detailed message be left on voicemail: yes     Reason for Call: Other: NP referred to urology for Hematospermia [R36.1]    Action Taken: Message routed to:  [facility] Urology    Travel Screening: Not Applicable

## 2020-04-14 NOTE — TELEPHONE ENCOUNTER
MEDICAL RECORDS REQUEST   [facility]  Urology Clinic  [address] [place] [address]  PHONE: [phone]  Fax: [phone]        FUTURE VISIT INFORMATION                                                   Enrico Pedroza, : 1970 scheduled for future visit at [facility]    APPOINTMENT INFORMATION:    Date: 20 1PM    Provider: Konrad Aguayo MD    Reason for Visit/Diagnosis: Hematospermia    REFERRAL INFORMATION:    Referring provider:  William Green    Specialty: MD    Referring providers clinic:  [facility]     Clinic contact number:  [phone]    RECORDS REQUESTED FOR VISIT                                                     NOTES  STATUS/DETAILS   OFFICE NOTE from referring provider  yes   OFFICE NOTE from other specialist  no   DISCHARGE SUMMARY from hospital  no   DISCHARGE REPORT from the ER  no   OPERATIVE REPORT  no   MEDICATION LIST  no     PRE-VISIT CHECKLIST      Record collection complete Yes- Internal recs in epic   Appointment appropriately scheduled           (right time/right provider) Yes   MyChart activation Yes   Questionnaire complete If no, please explain: In process      Completed by: Flavia Marquez

## 2020-04-15 ENCOUNTER — PRE VISIT (OUTPATIENT)
Dept: UROLOGY | Facility: CLINIC | Age: 50
End: 2020-04-15

## 2020-04-15 NOTE — TELEPHONE ENCOUNTER
Reason for Visit: Consult    Diagnosis: hematospermia    Orders/Procedures/Records: in system    Contact Patient: n/a    Rooming Requirements: Telephone Visit      Amanda Zhang LPN  04/15/20  3:12 PM

## 2020-04-16 ENCOUNTER — PRE VISIT (OUTPATIENT)
Dept: UROLOGY | Facility: CLINIC | Age: 50
End: 2020-04-16

## 2020-04-16 ENCOUNTER — VIRTUAL VISIT (OUTPATIENT)
Dept: UROLOGY | Facility: CLINIC | Age: 50
End: 2020-04-16
Attending: INTERNAL MEDICINE
Payer: COMMERCIAL

## 2020-04-16 ENCOUNTER — TELEPHONE (OUTPATIENT)
Dept: UROLOGY | Facility: CLINIC | Age: 50
End: 2020-04-16

## 2020-04-16 DIAGNOSIS — R36.1 HEMATOSPERMIA: ICD-10-CM

## 2020-04-16 DIAGNOSIS — N41.0 ACUTE PROSTATITIS: Primary | ICD-10-CM

## 2020-04-16 RX ORDER — LOPERAMIDE HCL 2 MG
CAPSULE ORAL
COMMUNITY
Start: 2019-10-07 | End: 2021-09-14

## 2020-04-16 RX ORDER — ONDANSETRON 4 MG/1
4 TABLET, ORALLY DISINTEGRATING ORAL
COMMUNITY
Start: 2019-07-09 | End: 2020-04-16

## 2020-04-16 RX ORDER — IBUPROFEN 600 MG/1
600 TABLET, FILM COATED ORAL
COMMUNITY
Start: 2019-07-09 | End: 2021-09-14

## 2020-04-16 RX ORDER — DICYCLOMINE HYDROCHLORIDE 10 MG/1
CAPSULE ORAL
COMMUNITY
Start: 2019-09-24 | End: 2021-10-15

## 2020-04-16 RX ORDER — SULFAMETHOXAZOLE/TRIMETHOPRIM 800-160 MG
1 TABLET ORAL 2 TIMES DAILY
Qty: 20 TABLET | Refills: 1 | Status: SHIPPED | OUTPATIENT
Start: 2020-04-16 | End: 2020-08-07

## 2020-04-16 RX ORDER — TAMSULOSIN HYDROCHLORIDE 0.4 MG/1
CAPSULE ORAL
COMMUNITY
Start: 2020-01-26 | End: 2020-04-16

## 2020-04-16 RX ORDER — CELECOXIB 200 MG/1
CAPSULE ORAL
COMMUNITY
Start: 2019-10-15 | End: 2021-09-14

## 2020-04-16 ASSESSMENT — PAIN SCALES - GENERAL: PAINLEVEL: EXTREME PAIN (8)

## 2020-04-16 NOTE — PROGRESS NOTES
"Enrico Pedroza is a 49 year old male who is being evaluated via a billable telephone visit.      The patient has been notified of following:     \"This telephone visit will be conducted via a call between you and your physician/provider. We have found that certain health care needs can be provided without the need for a physical exam.  This service lets us provide the care you need with a short phone conversation.  If a prescription is necessary we can send it directly to your pharmacy.  If lab work is needed we can place an order for that and you can then stop by our lab to have the test done at a later time.    If during the course of the call the physician/provider feels a telephone visit is not appropriate, you will not be charged for this service.\"     Patient has given verbal consent for Telephone visit?  Yes    Enrico Pedroza complains of  chief complaint      I have reviewed and updated the patient's Past Medical History, Social History, Family History and Medication List.    ALLERGIES  Albuterol and No clinical screening - see comments    Phone call duration: 25 minutes, starting at 1302 hrs        I am seeing Enrico Pedroza in consultation from William Green for evaluation of hematospermia.    HPI:  Enrico Pedroza is a 49 year old male with hematospermia for several months off and on.  Fairly mild amount of blood most of the time- but can be gpink tinged to red color.  A few weeks ago he had a very painful episode of ejaculation with semen that was very dark like old blood.  Since then, has not had many ejaculations but back to slightly pink color when he did check.  Since this time he has also been noting sensitive/tender testes also.    History of abdominal discomforts - vague-  in the past also.  His PCP deloris blood that was normal.  No prescription.    UA was crystal clear 3/26/20  Lab Results   Component Value Date    PSA 1.47 03/26/2020     No gross hematuria.       REVIEW OF " SYSTEMS:  General: negative  Skin: negative  Eyes: negative  Ears/Nose/Throat: negative  Respiratory: negative  Cardiovascular: negative  Gastrointestinal: negative  Genitourinary: see HPI  Musculoskeletal: negative  Neurologic: negative  Psychiatric: negative  Hematologic/Lymphatic/Immunologic: negative  Endocrine: negative    PAST MEDICAL HX:  Past Medical History:   Diagnosis Date     Allergic rhinitis, cause unspecified      Intermittent asthma     exercise/allergies/molds     Nephrolithiasis        PAST SURG HX:  Past Surgical History:   Procedure Laterality Date     COMBINED CYSTOSCOPY, RETROGRADES, URETEROSCOPY, INSERT STENT  3/5/2014    Procedure: COMBINED CYSTOSCOPY, RETROGRADES, URETEROSCOPY, INSERT STENT;  CYSTOSCOPY, FLEXIBLE URETEROSCOPY, LEFT STENT PLACEMENT, LEFT RETROGRADES, STONE EXTRACTION;  Surgeon: Kenny Marroquin MD;  Location: [facility] OR     NO HISTORY OF SURGERY          FAMILY HX:  Family History   Problem Relation Age of Onset     Diabetes Other         cousin and grandfather     C.A.D. Paternal Grandfather          MI     Breast Cancer Sister      Hypertension Mother      Breast Cancer Mother      Coronary Artery Disease Father      Prostate Cancer Father        SOCIAL HX:  Social History     Tobacco Use     Smoking status: Former Smoker     Packs/day: 1.00     Years: 6.00     Pack years: 6.00     Last attempt to quit: 1995     Years since quittin.3     Smokeless tobacco: Never Used   Substance Use Topics     Alcohol use: Yes     Alcohol/week: 0.8 - 1.7 standard drinks     Types: 1 - 2 Standard drinks or equivalent per week     Comment: once weekly     Drug use: No       MEDICATIONS:  Current Outpatient Medications   Medication Sig     Acetaminophen (TYLENOL PO) Take by mouth as needed for mild pain or fever      beclomethasone HFA (QVAR REDIHALER) 40 MCG/ACT inhaler Inhale 1 puff into the lungs 2 times daily     cetirizine (ZYRTEC) 10 MG tablet Take 10 mg by mouth daily  as needed      dicyclomine (BENTYL) 10 MG capsule      ibuprofen (ADVIL/MOTRIN) 600 MG tablet Take 600 mg by mouth     levalbuterol (XOPENEX HFA) 45 MCG/ACT inhaler INHALE 2 PUFFS INTO THE LUNGS EVERY 4 HOURS AS NEEDED FOR SHORTNESS OF BREATH OR DIFFICULT BREATHING OR WHEEZING     meclizine (ANTIVERT) 25 MG tablet Take 25 mg by mouth as needed for dizziness     omeprazole (PRILOSEC) 20 MG DR capsule Take 1 capsule (20 mg) by mouth daily as needed (heartburn)     raNITIdine HCl (ZANTAC PO) Take by mouth as needed for heartburn     celecoxib (CELEBREX) 200 MG capsule      loperamide (IMODIUM) 2 MG capsule      No current facility-administered medications for this visit.        ALLERGIES:  Albuterol and No clinical screening - see comments      GENERAL PHYSICAL EXAM:   .unable due to phone visit.    Imaging/labs:  Lab Results   Component Value Date    CR 0.91 03/26/2020    CR 0.94 06/28/2018    CR 0.95 07/13/2016     Lab Results   Component Value Date    PSA 1.47 03/26/2020       ASSESSMENT:     Hematospermia     PLAN:    Discussed that hematospermia often does not have a clear cause.    We have excluded prostate cancer to the best we can, with normal PSA value.  Unable to do MAYITO until the COVID situation settles down.    UA was crystal clear- no evidence of kidney stone or bladder cancer.    Discussed that we can try treating empirically for prostatitis with bactrim - he would like to give this a try.    Discussed observation overall is recommended.  Imaging would be optional at this time.  He would like to pursue imaging at this time.    Prostate MRI ordered, to evaluate for anatomic abnormalities of the prostate and rule-out any suspicious lesions.      Copied cc to Consulting provider William Green        Thank-you for the kind consultation.  Konrad Aguayo MD     Urological Surgeon

## 2020-04-16 NOTE — TELEPHONE ENCOUNTER
Left a detailed VM with the number to radiology for the patient to call and get his MRI scheduled.

## 2020-04-16 NOTE — LETTER
"4/16/2020       RE: Enrico Pedroza  [address] [facility] [address]     Dear Colleague,    Thank you for referring your patient, Enrico Pedroza, to the [facility] UROLOGY AND INST FOR PROSTATE AND UROLOGIC CANCERS at [facility]. Please see a copy of my visit note below.    Enrico Pedroza is a 49 year old male who is being evaluated via a billable telephone visit.      The patient has been notified of following:     \"This telephone visit will be conducted via a call between you and your physician/provider. We have found that certain health care needs can be provided without the need for a physical exam.  This service lets us provide the care you need with a short phone conversation.  If a prescription is necessary we can send it directly to your pharmacy.  If lab work is needed we can place an order for that and you can then stop by our lab to have the test done at a later time.    If during the course of the call the physician/provider feels a telephone visit is not appropriate, you will not be charged for this service.\"     Patient has given verbal consent for Telephone visit?  Yes    Enrico Pedroza complains of  chief complaint      I have reviewed and updated the patient's Past Medical History, Social History, Family History and Medication List.    ALLERGIES  Albuterol and No clinical screening - see comments    Phone call duration: 25 minutes, starting at 1302 hrs        I am seeing Enrico Pedroza in consultation from William Green for evaluation of hematospermia.    HPI:  Enrico Pedroza is a 49 year old male with hematospermia for several months off and on.  Fairly mild amount of blood most of the time- but can be gpink tinged to red color.  A few weeks ago he had a very painful episode of ejaculation with semen that was very dark like old blood.  Since then, has not had many ejaculations but back to slightly pink color when he did check.  " Since this time he has also been noting sensitive/tender testes also.    History of abdominal discomforts - vague-  in the past also.  His PCP deloris blood that was normal.  No prescription.    UA was crystal clear 3/26/20  Lab Results   Component Value Date    PSA 1.47 2020     No gross hematuria.       REVIEW OF SYSTEMS:  General: negative  Skin: negative  Eyes: negative  Ears/Nose/Throat: negative  Respiratory: negative  Cardiovascular: negative  Gastrointestinal: negative  Genitourinary: see HPI  Musculoskeletal: negative  Neurologic: negative  Psychiatric: negative  Hematologic/Lymphatic/Immunologic: negative  Endocrine: negative    PAST MEDICAL HX:  Past Medical History:   Diagnosis Date     Allergic rhinitis, cause unspecified      Intermittent asthma     exercise/allergies/molds     Nephrolithiasis        PAST SURG HX:  Past Surgical History:   Procedure Laterality Date     COMBINED CYSTOSCOPY, RETROGRADES, URETEROSCOPY, INSERT STENT  3/5/2014    Procedure: COMBINED CYSTOSCOPY, RETROGRADES, URETEROSCOPY, INSERT STENT;  CYSTOSCOPY, FLEXIBLE URETEROSCOPY, LEFT STENT PLACEMENT, LEFT RETROGRADES, STONE EXTRACTION;  Surgeon: Kenny Marroquin MD;  Location:  OR     NO HISTORY OF SURGERY          FAMILY HX:  Family History   Problem Relation Age of Onset     Diabetes Other         cousin and grandfather     C.A.D. Paternal Grandfather          MI     Breast Cancer Sister      Hypertension Mother      Breast Cancer Mother      Coronary Artery Disease Father      Prostate Cancer Father        SOCIAL HX:  Social History     Tobacco Use     Smoking status: Former Smoker     Packs/day: 1.00     Years: 6.00     Pack years: 6.00     Last attempt to quit: 1995     Years since quittin.3     Smokeless tobacco: Never Used   Substance Use Topics     Alcohol use: Yes     Alcohol/week: 0.8 - 1.7 standard drinks     Types: 1 - 2 Standard drinks or equivalent per week     Comment: once weekly     Drug  use: No       MEDICATIONS:  Current Outpatient Medications   Medication Sig     Acetaminophen (TYLENOL PO) Take by mouth as needed for mild pain or fever      beclomethasone HFA (QVAR REDIHALER) 40 MCG/ACT inhaler Inhale 1 puff into the lungs 2 times daily     cetirizine (ZYRTEC) 10 MG tablet Take 10 mg by mouth daily as needed      dicyclomine (BENTYL) 10 MG capsule      ibuprofen (ADVIL/MOTRIN) 600 MG tablet Take 600 mg by mouth     levalbuterol (XOPENEX HFA) 45 MCG/ACT inhaler INHALE 2 PUFFS INTO THE LUNGS EVERY 4 HOURS AS NEEDED FOR SHORTNESS OF BREATH OR DIFFICULT BREATHING OR WHEEZING     meclizine (ANTIVERT) 25 MG tablet Take 25 mg by mouth as needed for dizziness     omeprazole (PRILOSEC) 20 MG DR capsule Take 1 capsule (20 mg) by mouth daily as needed (heartburn)     raNITIdine HCl (ZANTAC PO) Take by mouth as needed for heartburn     celecoxib (CELEBREX) 200 MG capsule      loperamide (IMODIUM) 2 MG capsule      No current facility-administered medications for this visit.        ALLERGIES:  Albuterol and No clinical screening - see comments      GENERAL PHYSICAL EXAM:   .unable due to phone visit.    Imaging/labs:  Lab Results   Component Value Date    CR 0.91 03/26/2020    CR 0.94 06/28/2018    CR 0.95 07/13/2016     Lab Results   Component Value Date    PSA 1.47 03/26/2020       ASSESSMENT:     Hematospermia     PLAN:    Discussed that hematospermia often does not have a clear cause.    We have excluded prostate cancer to the best we can, with normal PSA value.  Unable to do MAYITO until the COVID situation settles down.    UA was crystal clear- no evidence of kidney stone or bladder cancer.    Discussed that we can try treating empirically for prostatitis with bactrim - he would like to give this a try.    Discussed observation overall is recommended.  Imaging would be optional at this time.  He would like to pursue imaging at this time.    Prostate MRI ordered, to evaluate for anatomic abnormalities of  the prostate and rule-out any suspicious lesions.      Copied cc to Consulting provider William Green        Thank-you for the kind consultation.  Konrad Aguayo MD     Urological Surgeon    Again, thank you for allowing me to participate in the care of your patient.      Sincerely,    Konrad Aguayo MD

## 2020-04-16 NOTE — NURSING NOTE
Called the pt 04/16/20, and 12:26 PM and reviewed his medications, history, and allergies. I then asked that he be in a quiet location with limited distractions so they can hear the provider well.    Chief Complaint   Patient presents with     Consult     New patient consult for hematospermia       There were no vitals taken for this visit. There is no height or weight on file to calculate BMI.    Patient Active Problem List   Diagnosis     Allergic rhinitis     Lumbago     CARDIOVASCULAR SCREENING; LDL GOAL LESS THAN 160     Elbow pain     Moderate persistent asthma     Ureteral stone     Chronic diarrhea       Allergies   Allergen Reactions     Albuterol Difficulty breathing     Lung pain      No Clinical Screening - See Comments Difficulty breathing     Habanero peppers and ingredients in Orange Crush; SOB, sweating       Current Outpatient Medications   Medication Sig Dispense Refill     ibuprofen (ADVIL/MOTRIN) 600 MG tablet Take 600 mg by mouth       ondansetron (ZOFRAN-ODT) 4 MG ODT tab Place 4 mg under the tongue       Acetaminophen (TYLENOL PO) Take by mouth as needed for mild pain or fever        beclomethasone HFA (QVAR REDIHALER) 40 MCG/ACT inhaler Inhale 1 puff into the lungs 2 times daily 1 Inhaler 11     celecoxib (CELEBREX) 200 MG capsule        cetirizine (ZYRTEC) 10 MG tablet Take 10 mg by mouth daily as needed  90 tablet 3     dicyclomine (BENTYL) 10 MG capsule        levalbuterol (XOPENEX HFA) 45 MCG/ACT inhaler INHALE 2 PUFFS INTO THE LUNGS EVERY 4 HOURS AS NEEDED FOR SHORTNESS OF BREATH OR DIFFICULT BREATHING OR WHEEZING 15 g 0     loperamide (IMODIUM) 2 MG capsule        meclizine (ANTIVERT) 25 MG tablet Take 25 mg by mouth as needed for dizziness       omeprazole (PRILOSEC) 20 MG DR capsule Take 1 capsule (20 mg) by mouth daily as needed (heartburn)       raNITIdine HCl (ZANTAC PO) Take by mouth as needed for heartburn       tamsulosin (FLOMAX) 0.4 MG capsule          Social History      Tobacco Use     Smoking status: Former Smoker     Packs/day: 1.00     Years: 6.00     Pack years: 6.00     Last attempt to quit: 1995     Years since quittin.3     Smokeless tobacco: Never Used   Substance Use Topics     Alcohol use: Yes     Alcohol/week: 0.8 - 1.7 standard drinks     Types: 1 - 2 Standard drinks or equivalent per week     Comment: once weekly     Drug use: No       eLia Ken CMA,  2020  12:26 PM

## 2020-04-16 NOTE — TELEPHONE ENCOUNTER
----- Message from BENNIE Baron sent at 4/16/2020  1:46 PM CDT -----  Regarding: Scheduling Imaging  Hello,     Could you please call this patient to schedule a prostate MRI? See below for urgency.     Thanks!  Barak   ----- Message -----  From: Chandni Boyd CMA  Sent: 4/16/2020   1:36 PM CDT  To: BENNIE Baron      ----- Message -----  From: Konrad Aguayo MD  Sent: 4/16/2020   1:24 PM CDT  To: [facility]    Need prostate MRI scheduled please.  Time frame is whenever radiology will let him get it.  Its urgent but not at all emergent    -Thank You

## 2020-04-21 ENCOUNTER — TELEPHONE (OUTPATIENT)
Dept: UROLOGY | Facility: CLINIC | Age: 50
End: 2020-04-21

## 2020-04-21 NOTE — TELEPHONE ENCOUNTER
----- Message from BENNIE Baron sent at 4/16/2020  1:46 PM CDT -----  Regarding: Scheduling Imaging  Hello,     Could you please call this patient to schedule a prostate MRI? See below for urgency.     Thanks!  Barak   ----- Message -----  From: Chandni Boyd CMA  Sent: 4/16/2020   1:36 PM CDT  To: BENNIE Baron      ----- Message -----  From: Konrad Aguayo MD  Sent: 4/16/2020   1:24 PM CDT  To: [facility]    Need prostate MRI scheduled please.  Time frame is whenever radiology will let him get it.  Its urgent but not at all emergent    -Thank You

## 2020-04-23 ENCOUNTER — ANCILLARY PROCEDURE (OUTPATIENT)
Dept: MRI IMAGING | Facility: CLINIC | Age: 50
End: 2020-04-23
Attending: UROLOGY
Payer: COMMERCIAL

## 2020-04-23 DIAGNOSIS — R36.1 HEMATOSPERMIA: ICD-10-CM

## 2020-04-23 RX ORDER — GADOBUTROL 604.72 MG/ML
10 INJECTION INTRAVENOUS ONCE
Status: COMPLETED | OUTPATIENT
Start: 2020-04-23 | End: 2020-04-23

## 2020-04-23 RX ADMIN — GADOBUTROL 10 ML: 604.72 INJECTION INTRAVENOUS at 13:20

## 2020-04-23 NOTE — DISCHARGE INSTRUCTIONS
MRI Contrast Discharge Instructions    The IV contrast you received today will pass out of your body in your  urine. This will happen in the next 24 hours. You will not feel this process.  Your urine will not change color.    Drink at least 4 extra glasses of water or juice today (unless your doctor  has restricted your fluids). This reduces the stress on your kidneys.  You may take your regular medicines.    If you are on dialysis: It is best to have dialysis today.    If you have a reaction: Most reactions happen right away. If you have  any new symptoms after leaving the hospital (such as hives or swelling),  call your hospital at the correct number below. Or call your family doctor.  If you have breathing distress or wheezing, call 911.    Special instructions: ***    I have read and understand the above information.    Signature:______________________________________ Date:___________    Staff:__________________________________________ Date:___________     Time:__________    [facility] Departments:    ___[place]: [phone]  ___[facility]: [phone]  ___[place]: [phone] ___[facility]: [phone]  ___[facility]: [phone]  ___[facility]: [phone]  ___[phone]  ___[facility]: [phone]  ___[phone]

## 2020-04-24 NOTE — RESULT ENCOUNTER NOTE
Dear Enrico     Here are your recent test results which are NORMAL.  There are no concerns on the prostate MRI. No evidence of malignancy.      Thank You,    Please let me know if you have any questions!    Demetri KENT

## 2020-08-07 ENCOUNTER — VIRTUAL VISIT (OUTPATIENT)
Dept: INTERNAL MEDICINE | Facility: CLINIC | Age: 50
End: 2020-08-07
Payer: COMMERCIAL

## 2020-08-07 ENCOUNTER — TELEPHONE (OUTPATIENT)
Dept: INTERNAL MEDICINE | Facility: CLINIC | Age: 50
End: 2020-08-07

## 2020-08-07 DIAGNOSIS — J45.41 MODERATE PERSISTENT ASTHMA WITH ACUTE EXACERBATION: ICD-10-CM

## 2020-08-07 DIAGNOSIS — J45.20 MILD INTERMITTENT ASTHMA WITHOUT COMPLICATION: Primary | ICD-10-CM

## 2020-08-07 PROCEDURE — 99213 OFFICE O/P EST LOW 20 MIN: CPT | Mod: 95 | Performed by: INTERNAL MEDICINE

## 2020-08-07 RX ORDER — LEVALBUTEROL TARTRATE 45 UG/1
AEROSOL, METERED ORAL
Qty: 15 G | Refills: 11 | Status: SHIPPED | OUTPATIENT
Start: 2020-08-07 | End: 2021-09-23

## 2020-08-07 ASSESSMENT — ASTHMA QUESTIONNAIRES
QUESTION_3 LAST FOUR WEEKS HOW OFTEN DID YOUR ASTHMA SYMPTOMS (WHEEZING, COUGHING, SHORTNESS OF BREATH, CHEST TIGHTNESS OR PAIN) WAKE YOU UP AT NIGHT OR EARLIER THAN USUAL IN THE MORNING: NOT AT ALL
QUESTION_4 LAST FOUR WEEKS HOW OFTEN HAVE YOU USED YOUR RESCUE INHALER OR NEBULIZER MEDICATION (SUCH AS ALBUTEROL): NOT AT ALL
QUESTION_5 LAST FOUR WEEKS HOW WOULD YOU RATE YOUR ASTHMA CONTROL: WELL CONTROLLED
ACT_TOTALSCORE: 24
QUESTION_2 LAST FOUR WEEKS HOW OFTEN HAVE YOU HAD SHORTNESS OF BREATH: NOT AT ALL
QUESTION_1 LAST FOUR WEEKS HOW MUCH OF THE TIME DID YOUR ASTHMA KEEP YOU FROM GETTING AS MUCH DONE AT WORK, SCHOOL OR AT HOME: NONE OF THE TIME

## 2020-08-07 NOTE — TELEPHONE ENCOUNTER
PA Initiation    Medication: levalbuterol (XOPENEX HFA) 45 MCG/ACT inhaler   Insurance Company: [employer] - Phone [phone] Fax [phone]  Pharm[facility] the Rx[id number] [place] - [address]  Filling Pharmacy Phone: [phone]  Filling Pharmacy Fax: [phone]  Start Date: 8/7/2020

## 2020-08-07 NOTE — TELEPHONE ENCOUNTER
Prior Authorization Retail Medication Request    Medication/Dose:   ICD code (if different than what is on RX):  j45.41  Previously Tried and Failed:    Rationale:      Insurance Name:  [employer]  Insurance ID:  [id number]      Pharmacy Information (if different than what is on RX)  Name:  [facility]  Phone:  [phone]

## 2020-08-07 NOTE — PROGRESS NOTES
"Enrico Pedroza is a 49 year old male who is being evaluated via a billable telephone visit.      The patient has been notified of following:     \"This video visit will be conducted via a call between you and your physician/provider. We have found that certain health care needs can be provided without the need for an in-person physical exam.  This service lets us provide the care you need with a video conversation.  If a prescription is necessary we can send it directly to your pharmacy.  If lab work is needed we can place an order for that and you can then stop by our lab to have the test done at a later time.    Video visits are billed at different rates depending on your insurance coverage.  Please reach out to your insurance provider with any questions.    If during the course of the call the physician/provider feels a video visit is not appropriate, you will not be charged for this service.\"    Patient has given verbal consent for Video visit? Yes  How would you like to obtain your AVS? MyChart  If you are dropped from the video visit, the video invite should be resent to: Text to cell phone: [phone]  Will anyone else be joining your video visit? No      Subjective     Enrico Pedroza is a 49 year old male who presents today via video visit for the following health issues:    HPI    Asthma Follow-Up    Was ACT completed today?    Yes    ACT Total Scores 8/7/2020   ACT TOTAL SCORE -   ASTHMA ER VISITS -   ASTHMA HOSPITALIZATIONS -   ACT TOTAL SCORE (Goal Greater than or Equal to 20) 24   In the past 12 months, how many times did you visit the emergency room for your asthma without being admitted to the hospital? 0   In the past 12 months, how many times were you hospitalized overnight because of your asthma? 0         How many days per week do you miss taking your asthma controller medication?  3    Please describe any recent triggers for your asthma: dust mites and mold    Have you had any Emergency " Room Visits, Urgent Care Visits, or Hospital Admissions since your last office visit?  No      How many servings of fruits and vegetables do you eat daily?  0-1    On average, how many sweetened beverages do you drink each day (Examples: soda, juice, sweet tea, etc.  Do NOT count diet or artificially sweetened beverages)?   2    How many days per week do you exercise enough to make your heart beat faster? 3 or less    How many minutes a day do you exercise enough to make your heart beat faster? 10 - 19  How many days per week do you miss taking your medication? 4    What makes it hard for you to take your medications?  remembering to take    Long hx of paradoxical reaction to albuterol 15-20 yrs ago.  Wheezing, chest pain confirmed with challenge of medication in ER.  Relieved with levoalbuterol rx and symptoms improved to a lesser extent with atrovent.   Currently not using controller QVAR- sx not bothersome enough that feels the need to use this daily.             Reviewed and updated as needed this visit by Provider         Review of Systems   Constitutional, HEENT, cardiovascular, pulmonary, gi and gu systems are negative, except as otherwise noted.      Objective             Physical Exam     GENERAL: Healthy, alert and no distress  RESP: No audible wheeze, cough.  Talks comfortably    PSYCH: Mentation appears normal, affect normal/bright, judgement and insight intact, normal speech and appearance well-groomed.      none         Assessment & Plan     1. Mild intermittent asthma without complication  Ok for rescue inhaler only. Given long hx of paradoxical rxn to plain albuterol as documented above continue with levalbuterol  - levalbuterol (XOPENEX HFA) 45 MCG/ACT inhaler; INHALE 1 TO 2 PUFFS INTO THE LUNGS EVERY 6 HOURS AS NEEDED FOR SHORTNESS OF BREATH OR DIFFICULT BREATHING  Dispense: 15 g; Refill: 11     BMI:   Estimated body mass index is 30.27 kg/m  as calculated from the following:    Height as of  "12/16/19: 1.803 m (5' 11\").    Weight as of 12/16/19: 98.4 kg (217 lb).           See Patient Instructions    Telephone time : 23 min     No follow-ups on file.    William Green MD  [facility]  No follow-ups on file.       William Green MD        "

## 2020-08-08 ASSESSMENT — ASTHMA QUESTIONNAIRES: ACT_TOTALSCORE: 24

## 2020-08-10 NOTE — TELEPHONE ENCOUNTER
Prior Authorization Approval    Authorization Effective Date: 7/8/2020  Authorization Expiration Date: 8/7/2021  Medication: levalbuterol (XOPENEX HFA) 45 MCG/ACT inhaler   Approved Dose/Quantity:   Reference #: [id number]   Insurance Company: [employer] - Phone [phone] Fax [phone]  Expected CoPay:       CoPay Card Available:      Foundation Assistance Needed:    Which Pharmacy is filling the prescription (Not needed for infusion/clinic administered): [id number] - [place], [place] - [address]  Pharmacy Notified: Yes  Patient Notified: Yes, **Instructed pharmacy to notify patient when script is ready to /ship.**

## 2021-01-15 ENCOUNTER — HEALTH MAINTENANCE LETTER (OUTPATIENT)
Age: 51
End: 2021-01-15

## 2021-02-04 NOTE — MR AVS SNAPSHOT
After Visit Summary   10/15/2018    Enrico Pedroza    MRN: [id number]           Patient Information     Date Of Birth          1970        Visit Information        Provider Department      10/15/2018 4:05 PM Tate Del Real PA-C [facility]        Today's Diagnoses     Chest congestion    -  1    Congestion of paranasal sinus        Seasonal allergic rhinitis due to other allergic trigger           Follow-ups after your visit        Who to contact     If you have questions or need follow up information about today's clinic visit or your schedule please contact [facility] directly at [phone].  Normal or non-critical lab and imaging results will be communicated to you by Memolanehart, letter or phone within 4 business days after the clinic has received the results. If you do not hear from us within 7 days, please contact the clinic through Memolanehart or phone. If you have a critical or abnormal lab result, we will notify you by phone as soon as possible.  Submit refill requests through CloudCrowd or call your pharmacy and they will forward the refill request to us. Please allow 3 business days for your refill to be completed.          Additional Information About Your Visit        MyChart Information     CloudCrowd gives you secure access to your electronic health record. If you see a primary care provider, you can also send messages to your care team and make appointments. If you have questions, please call your primary care clinic.  If you do not have a primary care provider, please call [phone] and they will assist you.        Care EveryWhere ID     This is your Care EveryWhere ID. This could be used by other organizations to access your [facility] medical records  [id number]        Your Vitals Were     Pulse Temperature Pulse Oximetry BMI (Body Mass Index)          94 97.1  F (36.2  C) (Oral) 97% 36.8 kg/m2         Blood Pressure from Last  Head, normocephalic, atraumatic, Face, Face within normal limits, Ears, External ears within normal limits, Nose/Nasopharynx, External nose  normal appearance, nares patent, no nasal discharge, Mouth and Throat, Oral cavity appearance normal, Breath odor normal, Lips, Appearance normal 3 Encounters:   10/15/18 123/76   06/26/18 128/80   02/26/18 124/74    Weight from Last 3 Encounters:   10/15/18 214 lb 6 oz (97.2 kg)   06/26/18 216 lb 8 oz (98.2 kg)   02/26/18 212 lb 8 oz (96.4 kg)              Today, you had the following     No orders found for display         Today's Medication Changes          These changes are accurate as of 10/15/18 11:59 PM.  If you have any questions, ask your nurse or doctor.               Start taking these medicines.        Dose/Directions    azithromycin 250 MG tablet   Commonly known as:  ZITHROMAX   Used for:  Chest congestion, Congestion of paranasal sinus   Started by:  Tate Del Real PA-C        2 tabs po qd day 1, then 1 tab po qd days 2-5   Quantity:  6 tablet   Refills:  0       benzonatate 200 MG capsule   Commonly known as:  TESSALON   Used for:  Seasonal allergic rhinitis due to other allergic trigger   Started by:  Tate Del Real PA-C        Dose:  200 mg   Take 1 capsule (200 mg) by mouth 3 times daily as needed for cough   Quantity:  21 capsule   Refills:  0         These medicines have changed or have updated prescriptions.        Dose/Directions    * fluticasone 50 MCG/ACT spray   Commonly known as:  FLONASE   This may have changed:  Another medication with the same name was added. Make sure you understand how and when to take each.   Used for:  Acute seasonal allergic rhinitis, unspecified trigger   Changed by:  Tate Del Real PA-C        Dose:  1-2 spray   Spray 1-2 sprays into both nostrils daily   Quantity:  1 Bottle   Refills:  0       * fluticasone 50 MCG/ACT spray   Commonly known as:  FLONASE   This may have changed:  You were already taking a medication with the same name, and this prescription was added. Make sure you understand how and when to take each.   Used for:  Seasonal allergic rhinitis due to other allergic trigger   Changed by:  Tate Del Real PA-C        Dose:  1-2 spray   Spray 1-2 sprays into both nostrils daily    Quantity:  1 Bottle   Refills:  3       * levalbuterol 45 MCG/ACT Inhaler   Commonly known as:  XOPENEX HFA   This may have changed:  Another medication with the same name was added. Make sure you understand how and when to take each.   Used for:  Moderate persistent asthma without complication   Changed by:  Tate Del Real PA-C        Dose:  1-2 puff   Inhale 1-2 puffs into the lungs every 6 hours as needed for shortness of breath / dyspnea   Quantity:  2 Inhaler   Refills:  11       * levalbuterol 45 MCG/ACT Inhaler   Commonly known as:  XOPENEX HFA   This may have changed:  You were already taking a medication with the same name, and this prescription was added. Make sure you understand how and when to take each.   Used for:  Chest congestion   Changed by:  Tate Del Real PA-C        Dose:  2 puff   Inhale 2 puffs into the lungs every 6 hours as needed for shortness of breath / dyspnea or wheezing   Quantity:  1 Inhaler   Refills:  3       * Notice:  This list has 4 medication(s) that are the same as other medications prescribed for you. Read the directions carefully, and ask your doctor or other care provider to review them with you.         Where to get your medicines      These medications were sent to [facility]/pharmacy [address] [place], [place] - [address] [address]     Phone:  [phone]     azithromycin 250 MG tablet    benzonatate 200 MG capsule    levalbuterol 45 MCG/ACT Inhaler         Some of these will need a paper prescription and others can be bought over the counter.  Ask your nurse if you have questions.     You don't need a prescription for these medications     fluticasone 50 MCG/ACT spray                Primary Care Provider Office Phone # Fax #    William Green [phone]       [address] [place] [id number]        Equal Access to Services     MARIE VERGARA AH: fortunato Ivan qaybta  jerod collado haydaisy canelaafua miranda ah. [facility] [phone].    ATENCIÓN: Si ni martin, tiene a aguilar disposición servicios gratuitos de asistencia lingüística. Tommy al [phone].    We comply with applicable federal civil rights laws and [place] laws. We do not discriminate on the basis of race, color, national origin, age, disability, sex, sexual orientation, or gender identity.            Thank you!     Thank you for choosing [facility]  for your care. Our goal is always to provide you with excellent care. Hearing back from our patients is one way we can continue to improve our services. Please take a few minutes to complete the written survey that you may receive in the mail after your visit with us. Thank you!             Your Updated Medication List - Protect others around you: Learn how to safely use, store and throw away your medicines at www.disposemymeds.org.          This list is accurate as of 10/15/18 11:59 PM.  Always use your most recent med list.                   Brand Name Dispense Instructions for use Diagnosis    azithromycin 250 MG tablet    ZITHROMAX    6 tablet    2 tabs po qd day 1, then 1 tab po qd days 2-5    Chest congestion, Congestion of paranasal sinus       beclomethasone 80 MCG/ACT Aers IS A DISCONTINUED MEDICATION    QVAR    1 Inhaler    Inhale 2 puffs into the lungs 2 times daily    Moderate persistent asthma without complication       benzonatate 200 MG capsule    TESSALON    21 capsule    Take 1 capsule (200 mg) by mouth 3 times daily as needed for cough    Seasonal allergic rhinitis due to other allergic trigger       cetirizine 10 MG tablet    zyrTEC    90 tablet    Take 10 mg by mouth daily as needed        * fluticasone 50 MCG/ACT spray    FLONASE    1 Bottle    Spray 1-2 sprays into both nostrils daily    Acute seasonal allergic rhinitis, unspecified trigger       * fluticasone 50 MCG/ACT spray    FLONASE    1 Bottle     Spray 1-2 sprays into both nostrils daily    Seasonal allergic rhinitis due to other allergic trigger       ibuprofen 800 MG tablet    ADVIL/MOTRIN    30 tablet    Take 1 tablet (800 mg) by mouth every 8 hours as needed for moderate pain    Acute low back pain without sciatica, unspecified back pain laterality       * levalbuterol 45 MCG/ACT Inhaler    XOPENEX HFA    2 Inhaler    Inhale 1-2 puffs into the lungs every 6 hours as needed for shortness of breath / dyspnea    Moderate persistent asthma without complication       * levalbuterol 45 MCG/ACT Inhaler    XOPENEX HFA    1 Inhaler    Inhale 2 puffs into the lungs every 6 hours as needed for shortness of breath / dyspnea or wheezing    Chest congestion       meclizine 25 MG tablet    ANTIVERT    60    1-2 TABLETS  EVERY 6 HOURS AS NEED FOR  DIZZNESS    Labyrinthitis, unspecified       OMEPRAZOLE PO      Take by mouth every morning        TYLENOL PO      Take 1,000 mg by mouth as needed for mild pain or fever        * Notice:  This list has 4 medication(s) that are the same as other medications prescribed for you. Read the directions carefully, and ask your doctor or other care provider to review them with you.

## 2021-04-26 ENCOUNTER — IMMUNIZATION (OUTPATIENT)
Dept: NURSING | Facility: CLINIC | Age: 51
End: 2021-04-26
Payer: COMMERCIAL

## 2021-04-26 PROCEDURE — 0001A PR COVID VAC PFIZER DIL RECON 30 MCG/0.3 ML IM: CPT

## 2021-04-26 PROCEDURE — 91300 PR COVID VAC PFIZER DIL RECON 30 MCG/0.3 ML IM: CPT

## 2021-05-17 ENCOUNTER — IMMUNIZATION (OUTPATIENT)
Dept: NURSING | Facility: CLINIC | Age: 51
End: 2021-05-17
Attending: INTERNAL MEDICINE
Payer: COMMERCIAL

## 2021-05-17 PROCEDURE — 91300 PR COVID VAC PFIZER DIL RECON 30 MCG/0.3 ML IM: CPT

## 2021-05-17 PROCEDURE — 0002A PR COVID VAC PFIZER DIL RECON 30 MCG/0.3 ML IM: CPT

## 2021-08-19 ENCOUNTER — TELEPHONE (OUTPATIENT)
Dept: INTERNAL MEDICINE | Facility: CLINIC | Age: 51
End: 2021-08-19

## 2021-08-19 ENCOUNTER — TELEPHONE (OUTPATIENT)
Dept: INTERNAL MEDICINE | Facility: CLINIC | Age: 51
End: 2021-08-19
Payer: COMMERCIAL

## 2021-08-19 NOTE — TELEPHONE ENCOUNTER
That is useless  rec'd check 02 sats. If anything < 94% inform us.   If interested given his BMI could be considered for monoclonal antibody rx.  I/he have to submit an application to the state to do this.  If interested he or I can do this at:     [email or web address]

## 2021-08-19 NOTE — TELEPHONE ENCOUNTER
Dr. Green,     Patient tested positive for COVID on 8/18/21, his wife was tested today.     Reports headache, has some mild heaviness//tighness in his chest ( inhaler helps with that), fatigue, body aches, stuffy/runny nose.     Patient requesting your thoughts about this medication and would  you prescribe this - Hydroxychloroquine    Can we leave a detailed message on this number? YES  Phone number patient can be reached at: Cell number on file:    Telephone Information:   Mobile [phone]       Melissa Wong RN  [facility] Triage

## 2021-08-19 NOTE — TELEPHONE ENCOUNTER
Reviewed all with pt, has oximeter at home,will let us know if worsening sx. Was unsure if would sign up for monoclonial abx, but sent pt website, and STRONGLY encouraged to fill out form if symptoms got any worse in the next 24 hours, as tx is time sensitive, and to call w questions.    No further questions at this time.  Nicolle Duarte, RN  [facility] RN Triage Team

## 2021-08-19 NOTE — TELEPHONE ENCOUNTER
Reason for call:  Patient reporting a symptom    Symptom or request: fatigue, aches pains, stuffy runny nose    Duration (how long have symptoms been present): 3-4 days    Have you been treated for this before? Yes    Additional comments: did receive a positive COVID test on 8/18, he is concerned what he should be doing especially with his asthma    Phone Number patient can be reached at:  Cell number on file:    Telephone Information:   Mobile [phone]       Best Time:  any    Can we leave a detailed message on this number:  YES    Call taken on 8/19/2021 at 12:21 PM by Priti Solitario

## 2021-09-05 ENCOUNTER — HEALTH MAINTENANCE LETTER (OUTPATIENT)
Age: 51
End: 2021-09-05

## 2021-09-14 ENCOUNTER — OFFICE VISIT (OUTPATIENT)
Dept: URGENT CARE | Facility: URGENT CARE | Age: 51
End: 2021-09-14
Payer: COMMERCIAL

## 2021-09-14 VITALS
SYSTOLIC BLOOD PRESSURE: 116 MMHG | TEMPERATURE: 98.8 F | DIASTOLIC BLOOD PRESSURE: 61 MMHG | HEART RATE: 94 BPM | OXYGEN SATURATION: 96 % | RESPIRATION RATE: 11 BRPM

## 2021-09-14 DIAGNOSIS — R06.02 SOB (SHORTNESS OF BREATH): ICD-10-CM

## 2021-09-14 DIAGNOSIS — R68.83 CHILLS: ICD-10-CM

## 2021-09-14 DIAGNOSIS — J01.01 ACUTE RECURRENT MAXILLARY SINUSITIS: ICD-10-CM

## 2021-09-14 DIAGNOSIS — R05.9 COUGH: Primary | ICD-10-CM

## 2021-09-14 PROCEDURE — U0005 INFEC AGEN DETEC AMPLI PROBE: HCPCS | Performed by: PHYSICIAN ASSISTANT

## 2021-09-14 PROCEDURE — 99213 OFFICE O/P EST LOW 20 MIN: CPT | Performed by: PHYSICIAN ASSISTANT

## 2021-09-14 PROCEDURE — U0003 INFECTIOUS AGENT DETECTION BY NUCLEIC ACID (DNA OR RNA); SEVERE ACUTE RESPIRATORY SYNDROME CORONAVIRUS 2 (SARS-COV-2) (CORONAVIRUS DISEASE [COVID-19]), AMPLIFIED PROBE TECHNIQUE, MAKING USE OF HIGH THROUGHPUT TECHNOLOGIES AS DESCRIBED BY CMS-2020-01-R: HCPCS | Performed by: PHYSICIAN ASSISTANT

## 2021-09-14 RX ORDER — NAPROXEN SODIUM 220 MG
220-275 TABLET ORAL 2 TIMES DAILY PRN
COMMUNITY
End: 2022-12-22

## 2021-09-14 ASSESSMENT — ENCOUNTER SYMPTOMS
FATIGUE: 1
SHORTNESS OF BREATH: 0
SINUS PRESSURE: 1
FEVER: 0
COUGH: 1
SINUS PAIN: 1
RHINORRHEA: 1
APPETITE CHANGE: 1
SORE THROAT: 0

## 2021-09-14 NOTE — PATIENT INSTRUCTIONS
Patient Education     Acute Bacterial Rhinosinusitis (ABRS)    Acute bacterial rhinosinusitis (ABRS) is an infection of your nasal cavity and sinuses. It s caused by bacteria. Acute means that you ve had symptoms for less than 4 weeks, but possibly up to 12 weeks.  Understanding your sinuses  The nasal cavity is the large air-filled space behind your nose. The sinuses are a group of spaces formed by the bones of your face. They connect with your nasal cavity. ABRS causes the tissue lining these spaces to become inflamed. Mucus may not drain normally. This leads to facial pain and other symptoms.  What causes ABRS?  ABRS most often follows an upper respiratory infection caused by a virus. Bacteria then infect the lining of your nasal cavity and sinuses. But you can also get ABRS if you have:    Nasal allergies    Long-term nasal swelling and congestion not caused by allergies    Blockage in the nose  Symptoms of ABRS  The symptoms of ABRS may be different for each person and include:    Nasal congestion or blockage    Pain or pressure in the face    Thick, colored drainage from the nose  Other symptoms may include:    Runny nose    Fluid draining from the nose down the throat (postnasal drip)    Headache    Cough    Pain    Fever  Diagnosing ABRS  ABRS may be diagnosed if you ve had an upper respiratory infection like a cold and cough for 10 or more days without improvement or with worsening symptoms. Your healthcare provider will ask about your symptoms and your medical history. The provider will check your vital signs, including your temperature. You ll have a physical exam. The healthcare provider will check your ears, nose, and throat. You likely won t need any tests. If ABRS comes back, you may have a culture or other tests.  Treatment for ABRS  Treatment may include:    Antibiotic medicine. This is for symptoms that last for at least 10 to 14 days.    Nasal corticosteroid medicine. Drops or spray used in the  nose can lessen swelling and congestion.    Over-the-counter pain medicine. This is to lessen sinus pain and pressure.    Nasal decongestant medicine. Spray or drops may help to lessen congestion. Do not use them for more than a few days.    Salt wash (saline irrigation). This can help to loosen mucus.  Possible complications of ABRS  ABRS may come back or become long-term (chronic). In rare cases, ABRS may cause complications such as:     Inflamed tissue around the brain and spinal cord (meningitis)    Inflamed tissue around the eyes (orbital cellulitis)    Inflamed bones around the sinuses (osteitis)  These problems may need to be treated in a hospital with intravenous (IV) antibiotic medicine or surgery.  When to call the healthcare provider  Call your healthcare provider if you have any of the following:    Symptoms that don t get better, or get worse    Symptoms that don t get better after 3 to 5 days on antibiotics    Trouble seeing    Swelling around your eyes    Confusion or trouble staying awake   Janis last reviewed this educational content on 5/1/2017 2000-2021 [employer]. All rights reserved. This information is not intended as a substitute for professional medical care. Always follow your healthcare professional's instructions.

## 2021-09-14 NOTE — PROGRESS NOTES
Assessment & Plan     Cough    - Symptomatic COVID-19 Virus (Coronavirus) by PCR Nose  - amoxicillin-clavulanate (AUGMENTIN) 875-125 MG tablet  Dispense: 20 tablet; Refill: 0    SOB (shortness of breath)    - Symptomatic COVID-19 Virus (Coronavirus) by PCR Nose  - amoxicillin-clavulanate (AUGMENTIN) 875-125 MG tablet  Dispense: 20 tablet; Refill: 0    Chills    - Symptomatic COVID-19 Virus (Coronavirus) by PCR Nose  - amoxicillin-clavulanate (AUGMENTIN) 875-125 MG tablet  Dispense: 20 tablet; Refill: 0    Acute recurrent maxillary sinusitis    - amoxicillin-clavulanate (AUGMENTIN) 875-125 MG tablet  Dispense: 20 tablet; Refill: 0     Take antibiotic as directed. COVID pending. Increase fluids with water, Pedialyte, Gatorade, or rehydrating beverages. Alternate Tylenol and Ibuprofen as needed for aches, pains or fever. Follow clear liquid to BRAT diet (bananas, rice, applesauce, toast) as needed for any upset stomach. Rest as much as possible. Use OTC cough and cold medication. Run humidifier at night. Follow up in clinic if symptoms persist or worsen. This usually can last 7-10 days.       Return in about 1 week (around 9/21/2021), or if symptoms worsen or fail to improve.        Subjective     Vinod is a 51 year old male who presents to clinic today self for the following health issues:  Chief Complaint   Patient presents with     Urgent Care     cough, sob, headache, running stuffy nose, chills, fatigue, pain in ears and loss of taste and smell for over a month - reports he tested positive for covid in August 2021.      Vinod presents with reports of COVID symptoms x 1 month. He is having some sinus issues but related this to allergies. He has tried Nyquil which offers some relief but Zyrtec makes it worse. He has been fully vaccinated.           Review of Systems   Constitutional: Positive for appetite change and fatigue. Negative for fever.   HENT: Positive for congestion, postnasal drip, rhinorrhea, sinus  pressure and sinus pain. Negative for ear discharge, ear pain and sore throat.    Respiratory: Positive for cough. Negative for shortness of breath.    Cardiovascular: Negative for chest pain.           Objective    There were no vitals taken for this visit.  Physical Exam  Constitutional:       Appearance: He is ill-appearing.   HENT:      Head: Normocephalic and atraumatic.      Right Ear: Tympanic membrane normal.      Left Ear: Tympanic membrane normal.      Nose:      Right Turbinates: Swollen.      Left Turbinates: Swollen.      Right Sinus: Maxillary sinus tenderness present.      Left Sinus: Maxillary sinus tenderness present.      Mouth/Throat:      Lips: Pink.      Mouth: Mucous membranes are moist.   Cardiovascular:      Rate and Rhythm: Normal rate and regular rhythm.      Heart sounds: Normal heart sounds.   Pulmonary:      Effort: Pulmonary effort is normal.      Breath sounds: Normal breath sounds.   Musculoskeletal:      Cervical back: Normal range of motion and neck supple.   Neurological:      Mental Status: He is alert.              Nirmal Simpson PA-C

## 2021-09-15 LAB — SARS-COV-2 RNA RESP QL NAA+PROBE: NEGATIVE

## 2021-09-21 DIAGNOSIS — J45.41 MODERATE PERSISTENT ASTHMA WITH ACUTE EXACERBATION: ICD-10-CM

## 2021-09-21 NOTE — LETTER
BERNABE [address] [address]  [phone]  September 29, 2021  Enrico Pedroza  [address] [facility] [address]    Dear Vinod,    I am contacting you regarding the refill request we received for you. After reviewing your chart it looks like you are overdue for your annual and for a med check. Please call [phone] or schedule this through my chart to continue to receive refills. If you anticipate running out before your appointment let us know and we can send in a meek refill.       Thank you,     BERNA[facility] nursing staff

## 2021-09-23 RX ORDER — LEVALBUTEROL TARTRATE 45 UG/1
AEROSOL, METERED ORAL
Qty: 15 G | Refills: 0 | Status: SHIPPED | OUTPATIENT
Start: 2021-09-23 | End: 2021-10-15

## 2021-09-23 NOTE — TELEPHONE ENCOUNTER
Routing refill request to provider for review/approval because:  Patient needs to be seen because it has been more than 6 months since last office visit   ACT Total Scores 6/26/2018 7/2/2019 8/7/2020   ACT TOTAL SCORE - - -   ASTHMA ER VISITS - - -   ASTHMA HOSPITALIZATIONS - - -   ACT TOTAL SCORE (Goal Greater than or Equal to 20) 21 21 24   In the past 12 months, how many times did you visit the emergency room for your asthma without being admitted to the hospital? 0 0 0   In the past 12 months, how many times were you hospitalized overnight because of your asthma? 0 0 0     Floridalma Swift, RN

## 2021-09-24 ENCOUNTER — TELEPHONE (OUTPATIENT)
Dept: INTERNAL MEDICINE | Facility: CLINIC | Age: 51
End: 2021-09-24

## 2021-09-24 NOTE — TELEPHONE ENCOUNTER
Prior Authorization Retail Medication Request    Medication/Dose: levalbuterol (XOPENEX HFA) 45 MCG/ACT inhaler  ICD code (if different than what is on RX): [J45.41]    Insurance Name: [employer]       Insurance ID:    Financial Summary               Enrico Brantley ([id number])     : 1970    SSN: [id number]      [address] [place] [address] THIERRYJOSÉ MIGUEL S    [facility] [address]    Home: [phone]       Work: [phone]    PCP: VANESSA ORANTES    Center: [facility]         No account information on file for this patient.      Employment Status: Full Time    Employer: OTHER      No account information on file for this patient.    Payor:              [employer]  Plan:               [employer]  Sponsor Code:       [id number]  Subscriber ID:      [id number]  Subscriber Name:    ENRICO BRANTLEY  Subscriber SSN:     [id number]  Subscriber Address: [address] [place] [address]    Effective From:     09/01/15  Effective To:         Group Number:       [id number]  Group Name  : Not Available              Pharmacy Information (if different than what is on RX)  Name:  [facility]  Phone:  [phone]

## 2021-09-27 NOTE — TELEPHONE ENCOUNTER
Prior Authorization Approval    Authorization Effective Date: 8/28/2021  Authorization Expiration Date: 9/27/2022  Medication: levalbuterol (XOPENEX HFA) 45 MCG/ACT inhaler  Approved Dose/Quantity:    Reference #:     Insurance Company: [employer] - Phone [phone] Fax [phone]  Expected CoPay:       CoPay Card Available:      Foundation Assistance Needed:    Which Pharmacy is filling the prescription (Not needed for infusion/clinic administered): [id number] - [place], [place] - [address]  Pharmacy Notified: Yes  Patient Notified: Yes  **Instructed pharmacy to notify patient when script is ready to /ship.**

## 2021-09-27 NOTE — TELEPHONE ENCOUNTER
Central Prior Authorization Team   Phone: [phone]    PA Initiation    Medication: levalbuterol (XOPENEX HFA) 45 MCG/ACT inhaler  Insurance Company: [employer] - Phone [phone] Fax [phone]  Pharmacy Filling the Rx: [id number] - [place], [place] - [address] AT [address] [place] [address] & [facility]  Filling Pharmacy Phone: [phone]  Filling Pharmacy Fax:    Start Date: 9/27/2021

## 2021-10-11 DIAGNOSIS — J45.40 MODERATE PERSISTENT ASTHMA WITHOUT COMPLICATION: ICD-10-CM

## 2021-10-11 NOTE — TELEPHONE ENCOUNTER
Reason for Call:  Medication or medication refill:    Do you use a [facility]?  Name of the pharmacy and phone number for the current request:  [id number] - [place], [place] - [address]    Name of the medication requested: levalbuterol (XOPENEX HFA) 45 MCG/ACT inhaler    Other request: Patient is currently messaging back and forth with scheduling pool through [employer] to find an appointment, either in clinic or virtual, and he is out of his rescue inhaler. Asking for refill to supply him until he is able to get in for appointment. Please advise.     Can we leave a detailed message on this number? YES    Phone number patient can be reached at: Cell number on file:    Telephone Information:   Mobile [phone]       Best Time: any    Call taken on 10/11/2021 at 11:58 AM by Kip Tamayo

## 2021-10-11 NOTE — TELEPHONE ENCOUNTER
Dr. Patel,     Patient is out of medication, virtual appointment made for Friday 10/15/21.     Pended script for review.     Melissa Wong RN

## 2021-10-15 ENCOUNTER — VIRTUAL VISIT (OUTPATIENT)
Dept: INTERNAL MEDICINE | Facility: CLINIC | Age: 51
End: 2021-10-15
Payer: COMMERCIAL

## 2021-10-15 DIAGNOSIS — J45.40 MODERATE PERSISTENT ASTHMA WITHOUT COMPLICATION: Primary | ICD-10-CM

## 2021-10-15 DIAGNOSIS — J45.41 MODERATE PERSISTENT ASTHMA WITH ACUTE EXACERBATION: ICD-10-CM

## 2021-10-15 DIAGNOSIS — H81.03 MENIERE'S DISEASE, BILATERAL: ICD-10-CM

## 2021-10-15 DIAGNOSIS — Z86.16 HISTORY OF COVID-19: ICD-10-CM

## 2021-10-15 PROCEDURE — 99214 OFFICE O/P EST MOD 30 MIN: CPT | Mod: 95 | Performed by: INTERNAL MEDICINE

## 2021-10-15 RX ORDER — LEVALBUTEROL TARTRATE 45 UG/1
1-2 AEROSOL, METERED ORAL EVERY 4 HOURS PRN
Qty: 15 G | Refills: 11 | Status: SHIPPED | OUTPATIENT
Start: 2021-10-15 | End: 2022-10-24

## 2021-10-15 ASSESSMENT — ASTHMA QUESTIONNAIRES
ACT_TOTALSCORE: 18
QUESTION_2 LAST FOUR WEEKS HOW OFTEN HAVE YOU HAD SHORTNESS OF BREATH: ONCE OR TWICE A WEEK
QUESTION_5 LAST FOUR WEEKS HOW WOULD YOU RATE YOUR ASTHMA CONTROL: SOMEWHAT CONTROLLED
QUESTION_3 LAST FOUR WEEKS HOW OFTEN DID YOUR ASTHMA SYMPTOMS (WHEEZING, COUGHING, SHORTNESS OF BREATH, CHEST TIGHTNESS OR PAIN) WAKE YOU UP AT NIGHT OR EARLIER THAN USUAL IN THE MORNING: NOT AT ALL
QUESTION_4 LAST FOUR WEEKS HOW OFTEN HAVE YOU USED YOUR RESCUE INHALER OR NEBULIZER MEDICATION (SUCH AS ALBUTEROL): ONE OR TWO TIMES PER DAY
QUESTION_1 LAST FOUR WEEKS HOW MUCH OF THE TIME DID YOUR ASTHMA KEEP YOU FROM GETTING AS MUCH DONE AT WORK, SCHOOL OR AT HOME: A LITTLE OF THE TIME

## 2021-10-15 NOTE — PROGRESS NOTES
Vinod is a 51 year old who is being evaluated via a billable video visit.      How would you like to obtain your AVS? MyChart  If the video visit is dropped, the invitation should be resent by: Send to e-mail at: [email or web address]  Will anyone else be joining your video visit? No      Video Start Time: 909    Assessment & Plan     Moderate persistent asthma without complication  History of COVID-19  - overall, sx seem quite well controlled. I do recommend he restart his QVAR though so that his reliance on his rescue inh can be reduced.    Meniere's disease, bilateral  Reports having this for yrs. Flares recently  Antiemetic acutely  Vestibular rehab prn   If sx seem more pronounced going ahead would consider -reeval by ENT/review rx options                   No follow-ups on file.    William Green MD  [facility]   Vinod is a 51 year old who presents for the following health issues     HPI   Dx covid 8/2021  Seen in  due to ongoing cough. At this point this has resolved     Asthma Follow-Up    Was ACT completed today?    Yes    ACT Total Scores 10/15/2021   ACT TOTAL SCORE -   ASTHMA ER VISITS -   ASTHMA HOSPITALIZATIONS -   ACT TOTAL SCORE (Goal Greater than or Equal to 20) 18   In the past 12 months, how many times did you visit the emergency room for your asthma without being admitted to the hospital? 0   In the past 12 months, how many times were you hospitalized overnight because of your asthma? 0         How many days per week do you miss taking your asthma controller medication?  Not currently taking controller medication    Please describe any recent triggers for your asthma: hay fever season, dust,     Have you had any Emergency Room Visits, Urgent Care Visits, or Hospital Admissions since your last office visit?  No    Reports acute bout of vertigo in last 2 days. Used meclizine w/only partial resolution.           Review of Systems   Constitutional,  HEENT, cardiovascular, pulmonary, gi and gu systems are negative, except as otherwise noted.      Objective           Vitals:  No vitals were obtained today due to virtual visit.    Physical Exam   GENERAL: Healthy, alert and no distress  EYES: Eyes grossly normal to inspection.  No discharge or erythema, or obvious scleral/conjunctival abnormalities.  RESP: No audible wheeze, cough, or visible cyanosis.  No visible retractions or increased work of breathing.    SKIN: Visible skin clear. No significant rash, abnormal pigmentation or lesions.  NEURO: Cranial nerves grossly intact.  Mentation and speech appropriate for age.  PSYCH: Mentation appears normal, affect normal/bright, judgement and insight intact, normal speech and appearance well-groomed.                Video-Visit Details    Type of service:  Video Visit    Video End Time:9:37 AM    Originating Location (pt. Location): Home    Distant Location (provider location):  [facility]     Platform used for Video Visit: [employer]

## 2021-10-16 ASSESSMENT — ASTHMA QUESTIONNAIRES: ACT_TOTALSCORE: 18

## 2021-10-18 RX ORDER — LEVALBUTEROL TARTRATE 45 UG/1
AEROSOL, METERED ORAL
Qty: 15 G | Refills: 0 | OUTPATIENT
Start: 2021-10-18

## 2021-10-18 NOTE — TELEPHONE ENCOUNTER
This refill request is a duplicate previously sent to pharmacy or currently in review.  Refused medication to pharmacy as duplicate.   Filomena Nelson RN

## 2022-02-02 ENCOUNTER — OFFICE VISIT (OUTPATIENT)
Dept: URGENT CARE | Facility: URGENT CARE | Age: 52
End: 2022-02-02
Payer: COMMERCIAL

## 2022-02-02 VITALS
TEMPERATURE: 97.8 F | WEIGHT: 206 LBS | SYSTOLIC BLOOD PRESSURE: 126 MMHG | HEART RATE: 76 BPM | DIASTOLIC BLOOD PRESSURE: 79 MMHG | RESPIRATION RATE: 18 BRPM | OXYGEN SATURATION: 99 % | BODY MASS INDEX: 28.73 KG/M2

## 2022-02-02 DIAGNOSIS — R22.9 LOCALIZED SUPERFICIAL SWELLING, MASS, OR LUMP: ICD-10-CM

## 2022-02-02 DIAGNOSIS — M62.830 SPASM OF BACK MUSCLES: Primary | ICD-10-CM

## 2022-02-02 PROCEDURE — 99214 OFFICE O/P EST MOD 30 MIN: CPT | Performed by: PHYSICIAN ASSISTANT

## 2022-02-02 RX ORDER — CYCLOBENZAPRINE HCL 10 MG
10 TABLET ORAL 3 TIMES DAILY PRN
Qty: 30 TABLET | Refills: 0 | Status: SHIPPED | OUTPATIENT
Start: 2022-02-02 | End: 2022-02-12

## 2022-02-02 NOTE — PATIENT INSTRUCTIONS
Patient Education     Relieving Tension in Your Back  Being relaxed helps keep your mind healthy and your back ready to move. Take short breaks often. Walk around. Stretch. Switch tasks. Also give the following a try.  Make time to relax. Start by setting aside 5 minutes daily.  Deep breathing    Deep breathing is a simple way to reduce stress. You can do it almost any time you need to relax.    Inhale slowly through your nose. Let your lungs and stomach expand.    Hold your breath for 2 to 3 seconds.    Exhale slowly through your mouth until your lungs feel empty. Repeat 3 to 4 times.  Relieve tension  Muscle tension can create tender spots called trigger points. The tips below may help relieve muscle tension.    Press the trigger point if you can reach it. If not, lie on a soft tennis ball, or ask a friend to press the spot. Use steady pressure for 10 to 15 seconds. Breathe deeply. Repeat a few times.    Massage trigger points with ice for 2 to 5 minutes. Press lightly at first. Slowly increase firmness.  [employer] last reviewed this educational content on 5/1/2018 2000-2021 [employer]. All rights reserved. This information is not intended as a substitute for professional medical care. Always follow your healthcare professional's instructions.           Patient Education     Lipoma, No Treatment  A lipoma is a non-cancerous (benign) tumor made up of fat tissue. It appears as a soft raised area, just under the skin. It's usually less than 2 inches across.   Home care  General information regarding lipoma includes:    No special care is needed for a lipoma.    You can consider removal for cosmetic reasons.    Sometimes lipomas are uncomfortable because they put pressure on surrounding tissues. This is also a reason to have a lipoma removed.  Follow-up care  Follow up with your healthcare provider, or as advised if you want to have the lipoma removed at a later time.   When to seek medical  advice  Call your healthcare provider right away if any of the following occur:    Redness, pain, tenderness, or drainage from the lipoma    Lipoma starts to enlarge, change shape, or become more solid    Changes in the color of the skin over the lipoma  [employer] last reviewed this educational content on 7/1/2019 2000-2021 [employer]. All rights reserved. This information is not intended as a substitute for professional medical care. Always follow your healthcare professional's instructions.           Patient Education     Back Spasm (No Trauma)    Spasm of the back muscles can occur after a sudden forceful twisting or bending such as in a car accident. A spasm can also happen after a simple awkward movement, or after lifting something heavy with poor body positioning. In any case, muscle spasm adds to the pain. Sleeping in an awkward position or on a poor quality mattress can also cause this. Some people respond to emotional stress by tensing the muscles of their back.  Pain that continues may need further assessment or other types of treatment such as physical therapy.  You don't always need X-rays for the first assessment of back pain, unless you had a physical injury such as from a car accident or fall. If your pain continues and doesn't respond to medical treatment, X-rays and other tests may then be done.   Home care    As soon as possible, start sitting or walking again. This will help prevent problems from a long bed rest. These problems include muscle weakness, worsening back stiffness and pain, and blood clots in the legs.    When in bed, try to find a position of comfort. A firm mattress is best. Try lying flat on your back with pillows under your knees. You can also try lying on your side with your knees bent up toward your chest and a pillow between your knees.    Don't sit for long periods. Also limit car rides and travel. This puts more stress on the lower back than standing or  walking.     During the first 24 to 72 hours after an injury or flare-up, put an ice pack on the painful area for 20 minutes, then remove it for 20 minutes. Do this over a period of 60 to 90 minutes, or several times a day. This will reduce swelling and pain. Always wrap ice packs in a thin towel.    You can start with ice, then switch to heat. Heat from a hot shower, hot bath, or heating pad reduces pain and works well for muscle spasms. Put heat on the painful area for 20 minutes, then remove it for 20 minutes. Do this over a period of 60 to 90 minutes, or several times a day. Don't sleep on a heating pad. It can burn or damage skin.    Alternate using ice and heat.    Be aware of safe lifting methods. don't lift anything over 15 pounds until all the pain is gone.  Gentle stretching will help your back heal faster. Do this simple routine 2 to 3 times a day until your back is feeling better.    Lie on your back with your knees bent and both feet on the ground.    Slowly raise your left knee to your chest as you flatten your lower back against the floor. Hold for 20 to 30 seconds.    Relax and repeat the exercise with your right knee.    Do 2 to 3 of these exercises for each leg.    Repeat, hugging both knees to your chest at the same time.    Don't bounce, but use a gentle pull.  Medicines  Talk with your doctor before using medicine, especially if you have other medical problems or are taking other medicines.  You may use over-the-counter medicines such as acetaminophen, ibuprofen, or naprosyn to control pain, unless your healthcare provider prescribed another pain medicine. Talk with your healthcare provider if you have a chronic condition such as diabetes, liver or kidney disease, stomach ulcer, or digestive bleeding, or are taking blood thinners.  Be careful if you are given prescription pain medicine, opioids, or medicine for muscle spasm. They can cause drowsiness, and affect your coordination, reflexes, and  judgment. Don't drive or operate heavy machinery when taking these medicines. Take pain medicine only as prescribed by your healthcare provider.  Follow-up care  Follow up with your doctor, or as advised. You may need physical therapy or more tests.  If X-rays were taken, they may be reviewed by a radiologist. You will be told of any new findings that may affect your care.  Call   Call if any of these occur:    Trouble breathing    Confusion    Drowsiness or trouble awakening    Fainting or loss of consciousness    Rapid or very slow heart rate    Loss of bowel or bladder control  When to seek medical advice  Call your healthcare provider right away if any of these occur:    Pain becomes worse or spreads to your legs    Weakness or numbness in one or both legs    Numbness in the groin or genital area    Fever of 100.4 F (38 C) or higher , or as directed by your healthcare provider    Chills    Burning or pain when passing urine  [employer] last reviewed this educational content on 11/1/2018 2000-2021 [employer]. All rights reserved. This information is not intended as a substitute for professional medical care. Always follow your healthcare professional's instructions.           Patient Education     Back Spasm (No Trauma)    Spasm of the back muscles can occur after a sudden forceful twisting or bending such as in a car accident. A spasm can also happen after a simple awkward movement, or after lifting something heavy with poor body positioning. In any case, muscle spasm adds to the pain. Sleeping in an awkward position or on a poor quality mattress can also cause this. Some people respond to emotional stress by tensing the muscles of their back.  Pain that continues may need further assessment or other types of treatment such as physical therapy.  You don't always need X-rays for the first assessment of back pain, unless you had a physical injury such as from a car accident or fall. If your pain  continues and doesn't respond to medical treatment, X-rays and other tests may then be done.   Home care    As soon as possible, start sitting or walking again. This will help prevent problems from a long bed rest. These problems include muscle weakness, worsening back stiffness and pain, and blood clots in the legs.    When in bed, try to find a position of comfort. A firm mattress is best. Try lying flat on your back with pillows under your knees. You can also try lying on your side with your knees bent up toward your chest and a pillow between your knees.    Don't sit for long periods. Also limit car rides and travel. This puts more stress on the lower back than standing or walking.     During the first 24 to 72 hours after an injury or flare-up, put an ice pack on the painful area for 20 minutes, then remove it for 20 minutes. Do this over a period of 60 to 90 minutes, or several times a day. This will reduce swelling and pain. Always wrap ice packs in a thin towel.    You can start with ice, then switch to heat. Heat from a hot shower, hot bath, or heating pad reduces pain and works well for muscle spasms. Put heat on the painful area for 20 minutes, then remove it for 20 minutes. Do this over a period of 60 to 90 minutes, or several times a day. Don't sleep on a heating pad. It can burn or damage skin.    Alternate using ice and heat.    Be aware of safe lifting methods. don't lift anything over 15 pounds until all the pain is gone.  Gentle stretching will help your back heal faster. Do this simple routine 2 to 3 times a day until your back is feeling better.    Lie on your back with your knees bent and both feet on the ground.    Slowly raise your left knee to your chest as you flatten your lower back against the floor. Hold for 20 to 30 seconds.    Relax and repeat the exercise with your right knee.    Do 2 to 3 of these exercises for each leg.    Repeat, hugging both knees to your chest at the same  time.    Don't bounce, but use a gentle pull.  Medicines  Talk with your doctor before using medicine, especially if you have other medical problems or are taking other medicines.  You may use over-the-counter medicines such as acetaminophen, ibuprofen, or naprosyn to control pain, unless your healthcare provider prescribed another pain medicine. Talk with your healthcare provider if you have a chronic condition such as diabetes, liver or kidney disease, stomach ulcer, or digestive bleeding, or are taking blood thinners.  Be careful if you are given prescription pain medicine, opioids, or medicine for muscle spasm. They can cause drowsiness, and affect your coordination, reflexes, and judgment. Don't drive or operate heavy machinery when taking these medicines. Take pain medicine only as prescribed by your healthcare provider.  Follow-up care  Follow up with your doctor, or as advised. You may need physical therapy or more tests.  If X-rays were taken, they may be reviewed by a radiologist. You will be told of any new findings that may affect your care.  Call   Call if any of these occur:    Trouble breathing    Confusion    Drowsiness or trouble awakening    Fainting or loss of consciousness    Rapid or very slow heart rate    Loss of bowel or bladder control  When to seek medical advice  Call your healthcare provider right away if any of these occur:    Pain becomes worse or spreads to your legs    Weakness or numbness in one or both legs    Numbness in the groin or genital area    Fever of 100.4 F (38 C) or higher , or as directed by your healthcare provider    Chills    Burning or pain when passing urine  [employer] last reviewed this educational content on 11/1/2018 2000-2021 [employer]. All rights reserved. This information is not intended as a substitute for professional medical care. Always follow your healthcare professional's instructions.           Patient Education     Understanding a  Lipoma     A lipoma is a lump under the skin that s made of fat. It s not cancer (benign). It feels soft like rubber when you press it, and in most cases it doesn t hurt. Some people have more than one. A lipoma grows slowly over time and doesn t cause many problems. Lipomas occur most often in adults from ages 40 to 60. They are more common in men.   How to say it  [id number]  What causes a lipoma?  Experts don't know yet what causes lipomas. They are still learning more. They may be partly caused by a problem in a gene. They can run in families. Familial multiple lipomatosis is when 2 or more family members have many lipomas.  Symptoms of a lipoma  The main symptom of a lipoma is a soft lump under the skin that doesn t hurt unless it is pressing on a nerve. It may be small, around 1/4 inch across. Or it may be larger, up to 4 inches across or more.  There are different kinds of lipomas. The most common kind occurs under the skin of the shoulders, chest, back, belly, or under the arms. In some cases, a lipoma can occur on the legs. In rare cases, one may occur deeper in the body or in a muscle.  Treatment for a lipoma  In most cases, a lipoma doesn t need treatment. Your healthcare provider may look at it during regular checkups to see if it changes.  But if the lipoma is painful or you want it removed for cosmetic reasons, it can be removed with surgery. The surgery is called excision. The lipoma will most likely not grow back after surgery. During surgery, the area around the lipoma is numbed. If you have a deep lipoma, you may need medicine to numb a larger area (regional anesthesia). Or you may need medicine to put you to sleep during the procedure (general anesthesia). Then the doctor makes a cut over the area of the lipoma. He or she removes the lump of fat. The cut is then closed with stitches.  Possible complications of a lipoma  A large lipoma inside the body can press on organs, nerves, or other tissues  and cause problems. For example, it can cause problems with breathing or digestion.  Living with a lipoma  Your healthcare provider may look at the lipoma during regular checkups to see if it changes or is causing problems.  When to call your healthcare provider  Call your healthcare provider right away if you have any of these:    Lipoma that grows quickly, causes pain, or feels hard    New lipomas  [employer] last reviewed this educational content on 11/1/2018 2000-2021 [employer]. All rights reserved. This information is not intended as a substitute for professional medical care. Always follow your healthcare professional's instructions.

## 2022-02-04 NOTE — PROGRESS NOTES
SUBJECTIVE:  Chief Complaint   Patient presents with     Urgent Care     back pain, HX of back pain      Enrico Pedroza is a 51 year old male presents with a chief complaint of left back pain.  The injury occurred 3 day(s) ago.   Woke up with pain.  The patient complained of moderate pain  and has not had decreased ROM.  Pain exacerbated by running and movement.  Relieved by ice.  He treated it initially with ice. This is not the first time this type of injury has occurred to this patient.     Past Medical History:   Diagnosis Date     Allergic rhinitis, cause unspecified      Intermittent asthma     exercise/allergies/molds     Nephrolithiasis      Current Outpatient Medications   Medication Sig Dispense Refill     Acetaminophen (TYLENOL PO) Take by mouth as needed for mild pain or fever        beclomethasone HFA (QVAR REDIHALER) 40 MCG/ACT inhaler Inhale 1 puff into the lungs 2 times daily 10.6 g 0     cetirizine (ZYRTEC) 10 MG tablet Take 10 mg by mouth daily as needed  90 tablet 3     cyclobenzaprine (FLEXERIL) 10 MG tablet Take 1 tablet (10 mg) by mouth 3 times daily as needed for muscle spasms 30 tablet 0     levalbuterol (XOPENEX HFA) 45 MCG/ACT inhaler Inhale 1-2 puffs into the lungs every 4 hours as needed for shortness of breath / dyspnea or wheezing 15 g 11     meclizine (ANTIVERT) 25 MG tablet Take 25 mg by mouth as needed for dizziness        naproxen sodium (ANAPROX) 220 MG tablet Take 220-275 mg by mouth 2 times daily as needed for moderate pain        omeprazole (PRILOSEC) 20 MG DR capsule Take 20 mg by mouth daily as needed (heartburn)        Social History     Tobacco Use     Smoking status: Former Smoker     Packs/day: 1.00     Years: 6.00     Pack years: 6.00     Quit date: 1995     Years since quittin.1     Smokeless tobacco: Never Used   Substance Use Topics     Alcohol use: Yes     Alcohol/week: 0.8 - 1.7 standard drinks     Types: 1 - 2 Standard drinks or equivalent per week      Comment: once weekly       ROS:  10 point ROS negative except as listed above      EXAM:   /79   Pulse 76   Temp 97.8  F (36.6  C)   Resp 18   Wt 93.4 kg (206 lb)   SpO2 99%   BMI 28.73 kg/m    Gen: healthy,alert,no distress  Extremity: back has paraspinal tenderness.   GENERAL APPEARANCE: healthy, alert and no distress  CHEST: clear to auscultation  CV: regular rate and rhythm  MS: no gross deformities noted, no evidence of inflammation in joints, FROM in all extremities.  SKIN: nontender mass at left scapula  NEURO: Normal strength and tone, sensory exam grossly normal, mentation intact and speech normal        ASSESSMENT:   (M62.830) Spasm of back muscles  (primary encounter diagnosis)  Plan: cyclobenzaprine (FLEXERIL) 10 MG tablet      (R22.9) Localized superficial swelling, mass, or lump  Comment: likely lipoma  Plan: follow up with changes, consider assessment by PCP    Patient Instructions     Patient Education     Relieving Tension in Your Back  Being relaxed helps keep your mind healthy and your back ready to move. Take short breaks often. Walk around. Stretch. Switch tasks. Also give the following a try.  Make time to relax. Start by setting aside 5 minutes daily.  Deep breathing    Deep breathing is a simple way to reduce stress. You can do it almost any time you need to relax.    Inhale slowly through your nose. Let your lungs and stomach expand.    Hold your breath for 2 to 3 seconds.    Exhale slowly through your mouth until your lungs feel empty. Repeat 3 to 4 times.  Relieve tension  Muscle tension can create tender spots called trigger points. The tips below may help relieve muscle tension.    Press the trigger point if you can reach it. If not, lie on a soft tennis ball, or ask a friend to press the spot. Use steady pressure for 10 to 15 seconds. Breathe deeply. Repeat a few times.    Massage trigger points with ice for 2 to 5 minutes. Press lightly at first. Slowly increase  firmness.  [employer] last reviewed this educational content on 5/1/2018 2000-2021 [employer]. All rights reserved. This information is not intended as a substitute for professional medical care. Always follow your healthcare professional's instructions.           Patient Education     Lipoma, No Treatment  A lipoma is a non-cancerous (benign) tumor made up of fat tissue. It appears as a soft raised area, just under the skin. It's usually less than 2 inches across.   Home care  General information regarding lipoma includes:    No special care is needed for a lipoma.    You can consider removal for cosmetic reasons.    Sometimes lipomas are uncomfortable because they put pressure on surrounding tissues. This is also a reason to have a lipoma removed.  Follow-up care  Follow up with your healthcare provider, or as advised if you want to have the lipoma removed at a later time.   When to seek medical advice  Call your healthcare provider right away if any of the following occur:    Redness, pain, tenderness, or drainage from the lipoma    Lipoma starts to enlarge, change shape, or become more solid    Changes in the color of the skin over the lipoma  [employer] last reviewed this educational content on 7/1/2019 2000-2021 [employer]. All rights reserved. This information is not intended as a substitute for professional medical care. Always follow your healthcare professional's instructions.           Patient Education     Back Spasm (No Trauma)    Spasm of the back muscles can occur after a sudden forceful twisting or bending such as in a car accident. A spasm can also happen after a simple awkward movement, or after lifting something heavy with poor body positioning. In any case, muscle spasm adds to the pain. Sleeping in an awkward position or on a poor quality mattress can also cause this. Some people respond to emotional stress by tensing the muscles of their back.  Pain that  continues may need further assessment or other types of treatment such as physical therapy.  You don't always need X-rays for the first assessment of back pain, unless you had a physical injury such as from a car accident or fall. If your pain continues and doesn't respond to medical treatment, X-rays and other tests may then be done.   Home care    As soon as possible, start sitting or walking again. This will help prevent problems from a long bed rest. These problems include muscle weakness, worsening back stiffness and pain, and blood clots in the legs.    When in bed, try to find a position of comfort. A firm mattress is best. Try lying flat on your back with pillows under your knees. You can also try lying on your side with your knees bent up toward your chest and a pillow between your knees.    Don't sit for long periods. Also limit car rides and travel. This puts more stress on the lower back than standing or walking.     During the first 24 to 72 hours after an injury or flare-up, put an ice pack on the painful area for 20 minutes, then remove it for 20 minutes. Do this over a period of 60 to 90 minutes, or several times a day. This will reduce swelling and pain. Always wrap ice packs in a thin towel.    You can start with ice, then switch to heat. Heat from a hot shower, hot bath, or heating pad reduces pain and works well for muscle spasms. Put heat on the painful area for 20 minutes, then remove it for 20 minutes. Do this over a period of 60 to 90 minutes, or several times a day. Don't sleep on a heating pad. It can burn or damage skin.    Alternate using ice and heat.    Be aware of safe lifting methods. don't lift anything over 15 pounds until all the pain is gone.  Gentle stretching will help your back heal faster. Do this simple routine 2 to 3 times a day until your back is feeling better.    Lie on your back with your knees bent and both feet on the ground.    Slowly raise your left knee to your  chest as you flatten your lower back against the floor. Hold for 20 to 30 seconds.    Relax and repeat the exercise with your right knee.    Do 2 to 3 of these exercises for each leg.    Repeat, hugging both knees to your chest at the same time.    Don't bounce, but use a gentle pull.  Medicines  Talk with your doctor before using medicine, especially if you have other medical problems or are taking other medicines.  You may use over-the-counter medicines such as acetaminophen, ibuprofen, or naprosyn to control pain, unless your healthcare provider prescribed another pain medicine. Talk with your healthcare provider if you have a chronic condition such as diabetes, liver or kidney disease, stomach ulcer, or digestive bleeding, or are taking blood thinners.  Be careful if you are given prescription pain medicine, opioids, or medicine for muscle spasm. They can cause drowsiness, and affect your coordination, reflexes, and judgment. Don't drive or operate heavy machinery when taking these medicines. Take pain medicine only as prescribed by your healthcare provider.  Follow-up care  Follow up with your doctor, or as advised. You may need physical therapy or more tests.  If X-rays were taken, they may be reviewed by a radiologist. You will be told of any new findings that may affect your care.  Call   Call if any of these occur:    Trouble breathing    Confusion    Drowsiness or trouble awakening    Fainting or loss of consciousness    Rapid or very slow heart rate    Loss of bowel or bladder control  When to seek medical advice  Call your healthcare provider right away if any of these occur:    Pain becomes worse or spreads to your legs    Weakness or numbness in one or both legs    Numbness in the groin or genital area    Fever of 100.4 F (38 C) or higher , or as directed by your healthcare provider    Chills    Burning or pain when passing urine  [employer] last reviewed this educational content on 11/1/2018     5114-2841 [employer]. All rights reserved. This information is not intended as a substitute for professional medical care. Always follow your healthcare professional's instructions.           Patient Education     Back Spasm (No Trauma)    Spasm of the back muscles can occur after a sudden forceful twisting or bending such as in a car accident. A spasm can also happen after a simple awkward movement, or after lifting something heavy with poor body positioning. In any case, muscle spasm adds to the pain. Sleeping in an awkward position or on a poor quality mattress can also cause this. Some people respond to emotional stress by tensing the muscles of their back.  Pain that continues may need further assessment or other types of treatment such as physical therapy.  You don't always need X-rays for the first assessment of back pain, unless you had a physical injury such as from a car accident or fall. If your pain continues and doesn't respond to medical treatment, X-rays and other tests may then be done.   Home care    As soon as possible, start sitting or walking again. This will help prevent problems from a long bed rest. These problems include muscle weakness, worsening back stiffness and pain, and blood clots in the legs.    When in bed, try to find a position of comfort. A firm mattress is best. Try lying flat on your back with pillows under your knees. You can also try lying on your side with your knees bent up toward your chest and a pillow between your knees.    Don't sit for long periods. Also limit car rides and travel. This puts more stress on the lower back than standing or walking.     During the first 24 to 72 hours after an injury or flare-up, put an ice pack on the painful area for 20 minutes, then remove it for 20 minutes. Do this over a period of 60 to 90 minutes, or several times a day. This will reduce swelling and pain. Always wrap ice packs in a thin towel.    You can start with  ice, then switch to heat. Heat from a hot shower, hot bath, or heating pad reduces pain and works well for muscle spasms. Put heat on the painful area for 20 minutes, then remove it for 20 minutes. Do this over a period of 60 to 90 minutes, or several times a day. Don't sleep on a heating pad. It can burn or damage skin.    Alternate using ice and heat.    Be aware of safe lifting methods. don't lift anything over 15 pounds until all the pain is gone.  Gentle stretching will help your back heal faster. Do this simple routine 2 to 3 times a day until your back is feeling better.    Lie on your back with your knees bent and both feet on the ground.    Slowly raise your left knee to your chest as you flatten your lower back against the floor. Hold for 20 to 30 seconds.    Relax and repeat the exercise with your right knee.    Do 2 to 3 of these exercises for each leg.    Repeat, hugging both knees to your chest at the same time.    Don't bounce, but use a gentle pull.  Medicines  Talk with your doctor before using medicine, especially if you have other medical problems or are taking other medicines.  You may use over-the-counter medicines such as acetaminophen, ibuprofen, or naprosyn to control pain, unless your healthcare provider prescribed another pain medicine. Talk with your healthcare provider if you have a chronic condition such as diabetes, liver or kidney disease, stomach ulcer, or digestive bleeding, or are taking blood thinners.  Be careful if you are given prescription pain medicine, opioids, or medicine for muscle spasm. They can cause drowsiness, and affect your coordination, reflexes, and judgment. Don't drive or operate heavy machinery when taking these medicines. Take pain medicine only as prescribed by your healthcare provider.  Follow-up care  Follow up with your doctor, or as advised. You may need physical therapy or more tests.  If X-rays were taken, they may be reviewed by a radiologist. You will  be told of any new findings that may affect your care.  Call   Call if any of these occur:    Trouble breathing    Confusion    Drowsiness or trouble awakening    Fainting or loss of consciousness    Rapid or very slow heart rate    Loss of bowel or bladder control  When to seek medical advice  Call your healthcare provider right away if any of these occur:    Pain becomes worse or spreads to your legs    Weakness or numbness in one or both legs    Numbness in the groin or genital area    Fever of 100.4 F (38 C) or higher , or as directed by your healthcare provider    Chills    Burning or pain when passing urine  [employer] last reviewed this educational content on 11/1/2018 2000-2021 [employer]. All rights reserved. This information is not intended as a substitute for professional medical care. Always follow your healthcare professional's instructions.           Patient Education     Understanding a Lipoma     A lipoma is a lump under the skin that s made of fat. It s not cancer (benign). It feels soft like rubber when you press it, and in most cases it doesn t hurt. Some people have more than one. A lipoma grows slowly over time and doesn t cause many problems. Lipomas occur most often in adults from ages 40 to 60. They are more common in men.   How to say it  [id number]-salvador  What causes a lipoma?  Experts don't know yet what causes lipomas. They are still learning more. They may be partly caused by a problem in a gene. They can run in families. Familial multiple lipomatosis is when 2 or more family members have many lipomas.  Symptoms of a lipoma  The main symptom of a lipoma is a soft lump under the skin that doesn t hurt unless it is pressing on a nerve. It may be small, around 1/4 inch across. Or it may be larger, up to 4 inches across or more.  There are different kinds of lipomas. The most common kind occurs under the skin of the shoulders, chest, back, belly, or under the arms. In some cases, a  lipoma can occur on the legs. In rare cases, one may occur deeper in the body or in a muscle.  Treatment for a lipoma  In most cases, a lipoma doesn t need treatment. Your healthcare provider may look at it during regular checkups to see if it changes.  But if the lipoma is painful or you want it removed for cosmetic reasons, it can be removed with surgery. The surgery is called excision. The lipoma will most likely not grow back after surgery. During surgery, the area around the lipoma is numbed. If you have a deep lipoma, you may need medicine to numb a larger area (regional anesthesia). Or you may need medicine to put you to sleep during the procedure (general anesthesia). Then the doctor makes a cut over the area of the lipoma. He or she removes the lump of fat. The cut is then closed with stitches.  Possible complications of a lipoma  A large lipoma inside the body can press on organs, nerves, or other tissues and cause problems. For example, it can cause problems with breathing or digestion.  Living with a lipoma  Your healthcare provider may look at the lipoma during regular checkups to see if it changes or is causing problems.  When to call your healthcare provider  Call your healthcare provider right away if you have any of these:    Lipoma that grows quickly, causes pain, or feels hard    New lipomas  [employer] last reviewed this educational content on 11/1/2018 2000-2021 [employer]. All rights reserved. This information is not intended as a substitute for professional medical care. Always follow your healthcare professional's instructions.

## 2022-02-19 ENCOUNTER — HEALTH MAINTENANCE LETTER (OUTPATIENT)
Age: 52
End: 2022-02-19

## 2022-07-11 ENCOUNTER — E-VISIT (OUTPATIENT)
Dept: INTERNAL MEDICINE | Facility: CLINIC | Age: 52
End: 2022-07-11
Payer: COMMERCIAL

## 2022-07-11 DIAGNOSIS — M54.50 ACUTE MIDLINE LOW BACK PAIN WITHOUT SCIATICA: Primary | ICD-10-CM

## 2022-07-11 PROCEDURE — 99421 OL DIG E/M SVC 5-10 MIN: CPT | Performed by: INTERNAL MEDICINE

## 2022-07-11 RX ORDER — NAPROXEN 500 MG/1
500 TABLET ORAL 2 TIMES DAILY WITH MEALS
Qty: 14 TABLET | Refills: 0 | Status: SHIPPED | OUTPATIENT
Start: 2022-07-11 | End: 2022-07-18

## 2022-07-11 RX ORDER — CYCLOBENZAPRINE HCL 10 MG
10 TABLET ORAL 3 TIMES DAILY PRN
Qty: 21 TABLET | Refills: 0 | Status: SHIPPED | OUTPATIENT
Start: 2022-07-11 | End: 2022-07-18

## 2022-07-11 NOTE — PATIENT INSTRUCTIONS
Caring for Your Back    You are not alone.    Low back pain is very common. Nearly half of all adults will have low back pain in any given year. The good news is that back pain is rarely a danger to your health. Most people can manage their back pain on their own. About half of people start feeling better within two weeks. In 9 out of 10 cases, low back pain goes away or no longer limits daily activity within 6 weeks.     Your outlook is good!     Your symptoms tell us that your low back pain is most likely not a danger to you. Most of the time we will not know the exact cause of low back pain, even if you see a doctor or have an MRI. However, treatment can still work without knowing the cause of the pain. Less than 1 in 100 people need surgery for their back pain.     What can I do about my low back pain?     There are three basic things you can do to ease low back pain and help it go away.     Use heat or cold packs.    Take medicine as directed.    Use positions, movements and exercises.    Using heat or cold packs:    Try cold packs or gentle heat to ease your pain.  Use whichever gives you the most relief. Apply the cold pack or heat for 15 minutes at a time, as often as needed.    Taking medicine:    If taking over-the-counter medicine:    Take ibuprofen (Advil, Motrin) 600 mg three times a day as needed for pain.  OR    Take Aleve (naproxen) 220 to 440 mg two times a day as needed for pain. If your doctor prescribed a muscle relaxant (cyclobenzaprine 10 mg.):    Take   to 1 tablet at bedtime.    Do not drive when taking this medicine. This drug may make you sleepy.     Using positions, movements and exercises:    Research tells us that moving your joints and muscles can help you recover from back pain. Such activity should be simple and gentle. Use the positions below as well as walking to help relieve your pain. Try taking a short walk every 3 to 4 hours during the day. Walk for a few minutes inside your  home or take longer walks outside, on a treadmill or at a mall. Slowly increase the amount of time you walk. Expect discomfort when you begin, but it should lessen as your back starts to heal. When your back feels better, walk daily to keep your back and body healthy.    Finding a position that is comfortable:    When your back pain is new, certain positions will ease your pain. Gently try each of the positions below until you find one that is helpful. Once you find a position of comfort, use it as often as you like when you are resting. You will recover faster if you combine rest with activity.    * Lie on your back with your legs bent. You can do this by placing a pillow under your knees or lie on the floor and rest your lower legs on the seat of a chair.  * Lie on your side with your knees bent and place a pillow between your knees.    Lie on your stomach over pillows.       When should I call my doctor?    Your back pain should improve over the first couple of weeks. As it improves, you should be able to return to your normal activities.  But call your doctor if:      You have a sudden change in your ability to control your bladder or bowels.    You begin to feel tingling in your groin or legs.    The pain spreads down your leg and into your foot.    Your toes, feet or leg muscles begin to feel weak.    You feel generally unwell or sick.    Your pain gets worse.    If you are deaf or hard of hearing, please let us know. We provide many free services including sign language interpreters, oral interpreters, TTYs, telephone amplifiers, note takers and written materials.    For informational purposes only. Not to replace the advice of your health care provider. Copyright   2013 [facility]. All rights reserved. [employer] 933191 - 04/13.    Mini Relaxation Exercises  Source www.tobaccofreeu.org    Mini relaxation exercises are focused breathing techniques that help reduce  anxiety and tension  immediately. You can do them with your eyes open or  closed. You can do them any place, at any time; no one will know that you are  doing them.    Good Times to Do a  Mini     Before taking an exam    While at the computer lab waiting for your document to print    While waiting in line    When someone says something that bothers you    While waiting for the announcement of a grade    While waiting for a phone call    In the dentist s chair    When you feel overwhelmed by what you need to accomplish in the near  future    When in pain    When you want to     Mini Version 1- Breath Focused  Position yourself in a supportive comfortable chair or lying in a position that is least painful. (Often this is on your back with pillows under your knees)    a. Count very slowly to yourself from ten down to zero, one number for  each breath. Thus, with the first diaphragmatic breath, you say  ten  to  yourself, with the next breath, you say  nine , etc. If you start feeling  light-headed or dizzy, slow down the counting. When you get to  zero ,  see how you are feeling. If you are feeling better, great! If not, try to  do it again.    b. As you inhale, count very slowly up to four; as you exhale, count slowly  back down to one. Thus, as you inhale, you say to yourself  one, two,  three, four , as you exhale, you say to yourself  four, three, two, one .  Do this several times.    c. After each inhalation, pause for a few seconds; after you exhale, pause  again for a few seconds. Do this for several breaths.    Mini Version 2- Physical Focused  a. Massage your forehead, jaw, back of neck, and shoulders  b. Stretch and yawn  c. Walk counting four paces as you breathe in and four paces as you  breathe out  d. Coordinate your breath with any activity, for example, writing, jogging,  drawing, lifting weights, walking, etc.    Mini Version 3- Imagery Focused  Position yourself in a supportive comfortable chair or lying in a position  that is least painful. (Often this is on your back with pillows under your knees)    a. Briefly picture yourself in a place you find relaxing  b. Contemplate a picture of a natural scene  c. Imagine the characteristics of one of the following or any other object  which portrays characteristics you find calming or supportive in the  moment:    Tree (strong, rooted, expansive)    Mountain (timeless, strong, stable)    Waves (fluid, limitless)    Sun (radiant, warm)    Wind (free)    Mini Version 4- Mindfulness Focused  a. Look out the window for a few moments  b. Notice something new  c. Listen. What is the most distant sound you hear? The next distant?  d. Appreciate the sensation of being in the situation, the feel, smell, sight of  certain objects  e. Focus on being exactly where you are, keeping your thoughts from flowing  into the future or past    For the shoulder pain I recommend having that evaluated further if you feel there is something actually in the back of the scapula or shoulder region pushing your shoulder forward.

## 2022-09-16 ENCOUNTER — TELEPHONE (OUTPATIENT)
Dept: INTERNAL MEDICINE | Facility: CLINIC | Age: 52
End: 2022-09-16

## 2022-10-20 DIAGNOSIS — J45.41 MODERATE PERSISTENT ASTHMA WITH ACUTE EXACERBATION: ICD-10-CM

## 2022-10-22 ENCOUNTER — HEALTH MAINTENANCE LETTER (OUTPATIENT)
Age: 52
End: 2022-10-22

## 2022-10-24 RX ORDER — LEVALBUTEROL TARTRATE 45 UG/1
1-2 AEROSOL, METERED ORAL EVERY 4 HOURS PRN
Qty: 15 G | Refills: 1 | Status: SHIPPED | OUTPATIENT
Start: 2022-10-24 | End: 2022-11-28

## 2022-10-24 NOTE — TELEPHONE ENCOUNTER
Routing refill request to provider for review/approval because:  ACT score out of date/range   ACT and ATAQ Scores  4/22/2015   6/6/2016   4/3/2017   6/26/2018   7/2/2019   8/7/2020   10/15/2021    ACT TOTAL SCORE (Goal Greater than or Equal to 20) - 20 20 21 21 24 18   ACT TOTAL SCORE 20 - - - - - -    4/22/2015 6/6/2016 4/3/2017 6/26/2018 7/2/2019 8/7/2020 10/15/2021

## 2022-10-25 ENCOUNTER — TELEPHONE (OUTPATIENT)
Dept: INTERNAL MEDICINE | Facility: CLINIC | Age: 52
End: 2022-10-25

## 2022-10-25 NOTE — TELEPHONE ENCOUNTER
PA Initiation    Medication: levalbuterol (XOPENEX HFA) 45 MCG/ACT inhaler  Insurance Company: [employer] - Phone [phone] Fax [phone]  Pharmacy Filling the Rx: [id number] - [place] - [address]  Filling Pharmacy Phone: [phone]  Filling Pharmacy Fax: [phone]  Start Date: 10/25/2022

## 2022-10-25 NOTE — TELEPHONE ENCOUNTER
See encounter from 10/25/22 PA.     Scheduled pt appt.     Please advise if there are any further recommendations.

## 2022-10-25 NOTE — TELEPHONE ENCOUNTER
Prior Authorization Retail Medication Request    Medication/Dose: levalbuterol (XOPENEX HFA) 45 MCG/ACT inhaler  ICD code (if different than what is on RX):  Moderate persistent asthma with acute exacerbation [J45.41]   Previously Tried and Failed:    Rationale:      Insurance Name:  [employer]    Insurance ID:  [id number]        Pharmacy Information (if different than what is on RX)  Name:  [facility]  Phone:  [phone]

## 2022-10-25 NOTE — TELEPHONE ENCOUNTER
Pt called the clinic stating he only has about 3 puffs left on his inhaler and has been using it more frequently because he has been sick for about a week.     Informed pt of PCPs note of needing appt and ACT score per last refill request.       Pt is wondering if he pays out of pocket for this medication if it cancels the PA or interefers with the PA in anyway? Pt stated he is getting nervous as it is his rescue medication. Routing HP due to this.     Please call pt back with response.     Can we leave a detailed message on this number? YES  Phone number patient can be reached at: Cell number on file:    Telephone Information:   Mobile [phone]       Roslyn Sears RN  [facility] Triage

## 2022-10-26 NOTE — TELEPHONE ENCOUNTER
Prior Authorization Approval    Authorization Effective Date: 9/25/2022  Authorization Expiration Date: 10/26/2023  Medication: levalbuterol (XOPENEX HFA) 45 MCG/ACT inhaler  Approved Dose/Quantity:   Reference #: [id number]   Insurance Company: [employer] - Phone [phone] Fax [phone]  Which Pharmacy is filling the prescription (Not needed for infusion/clinic administered): [id number] [place], [place] - [address]  Pharmacy Notified: Yes  Patient Notified: Yes

## 2022-11-28 DIAGNOSIS — J45.41 MODERATE PERSISTENT ASTHMA WITH ACUTE EXACERBATION: ICD-10-CM

## 2022-11-28 RX ORDER — LEVALBUTEROL TARTRATE 45 UG/1
AEROSOL, METERED ORAL
Qty: 15 G | Refills: 1 | Status: SHIPPED | OUTPATIENT
Start: 2022-11-28 | End: 2022-12-22

## 2022-11-28 NOTE — TELEPHONE ENCOUNTER
Routing refill request to provider for review/approval because:  ACT Total Scores 7/2/2019 8/7/2020 10/15/2021   ACT TOTAL SCORE - - -   ASTHMA ER VISITS - - -   ASTHMA HOSPITALIZATIONS - - -   ACT TOTAL SCORE (Goal Greater than or Equal to 20) 21 24 18   In the past 12 months, how many times did you visit the emergency room for your asthma without being admitted to the hospital? 0 0 0   In the past 12 months, how many times were you hospitalized overnight because of your asthma? 0 0 0     Sue HANEY RN  [facility]

## 2022-12-22 ENCOUNTER — OFFICE VISIT (OUTPATIENT)
Dept: INTERNAL MEDICINE | Facility: CLINIC | Age: 52
End: 2022-12-22
Payer: COMMERCIAL

## 2022-12-22 VITALS
RESPIRATION RATE: 14 BRPM | SYSTOLIC BLOOD PRESSURE: 118 MMHG | WEIGHT: 213.2 LBS | HEIGHT: 71 IN | TEMPERATURE: 97 F | HEART RATE: 79 BPM | BODY MASS INDEX: 29.85 KG/M2 | OXYGEN SATURATION: 95 % | DIASTOLIC BLOOD PRESSURE: 76 MMHG

## 2022-12-22 DIAGNOSIS — Z00.00 ROUTINE GENERAL MEDICAL EXAMINATION AT A HEALTH CARE FACILITY: Primary | ICD-10-CM

## 2022-12-22 DIAGNOSIS — J45.20 MILD INTERMITTENT ASTHMA, UNSPECIFIED WHETHER COMPLICATED: ICD-10-CM

## 2022-12-22 DIAGNOSIS — Z11.59 NEED FOR HEPATITIS C SCREENING TEST: ICD-10-CM

## 2022-12-22 DIAGNOSIS — Z23 NEED FOR VACCINATION: ICD-10-CM

## 2022-12-22 DIAGNOSIS — S46.002A UNSP INJ MUSC/TEND THE ROTATOR CUFF OF L SHOULDER, INIT: ICD-10-CM

## 2022-12-22 DIAGNOSIS — Z13.1 SCREENING FOR DIABETES MELLITUS: ICD-10-CM

## 2022-12-22 DIAGNOSIS — Z13.220 LIPID SCREENING: ICD-10-CM

## 2022-12-22 DIAGNOSIS — Z12.5 PROSTATE CANCER SCREENING: ICD-10-CM

## 2022-12-22 LAB
ANION GAP SERPL CALCULATED.3IONS-SCNC: 9 MMOL/L (ref 7–15)
BUN SERPL-MCNC: 11.5 MG/DL (ref 6–20)
CALCIUM SERPL-MCNC: 9.6 MG/DL (ref 8.6–10)
CHLORIDE SERPL-SCNC: 109 MMOL/L (ref 98–107)
CHOLEST SERPL-MCNC: 173 MG/DL
CREAT SERPL-MCNC: 0.91 MG/DL (ref 0.67–1.17)
DEPRECATED HCO3 PLAS-SCNC: 25 MMOL/L (ref 22–29)
GFR SERPL CREATININE-BSD FRML MDRD: >90 ML/MIN/1.73M2
GLUCOSE SERPL-MCNC: 87 MG/DL (ref 70–99)
HCV AB SERPL QL IA: NONREACTIVE
HDLC SERPL-MCNC: 55 MG/DL
LDLC SERPL CALC-MCNC: 107 MG/DL
NONHDLC SERPL-MCNC: 118 MG/DL
POTASSIUM SERPL-SCNC: 4.6 MMOL/L (ref 3.4–5.3)
PSA SERPL-MCNC: 1.93 NG/ML (ref 0–3.5)
SODIUM SERPL-SCNC: 143 MMOL/L (ref 136–145)
TRIGL SERPL-MCNC: 57 MG/DL

## 2022-12-22 PROCEDURE — 86803 HEPATITIS C AB TEST: CPT | Performed by: INTERNAL MEDICINE

## 2022-12-22 PROCEDURE — 90732 PPSV23 VACC 2 YRS+ SUBQ/IM: CPT | Performed by: INTERNAL MEDICINE

## 2022-12-22 PROCEDURE — 80048 BASIC METABOLIC PNL TOTAL CA: CPT | Performed by: INTERNAL MEDICINE

## 2022-12-22 PROCEDURE — 90471 IMMUNIZATION ADMIN: CPT | Performed by: INTERNAL MEDICINE

## 2022-12-22 PROCEDURE — 99213 OFFICE O/P EST LOW 20 MIN: CPT | Mod: 25 | Performed by: INTERNAL MEDICINE

## 2022-12-22 PROCEDURE — 90472 IMMUNIZATION ADMIN EACH ADD: CPT | Performed by: INTERNAL MEDICINE

## 2022-12-22 PROCEDURE — 90682 RIV4 VACC RECOMBINANT DNA IM: CPT | Performed by: INTERNAL MEDICINE

## 2022-12-22 PROCEDURE — G0103 PSA SCREENING: HCPCS | Performed by: INTERNAL MEDICINE

## 2022-12-22 PROCEDURE — 99396 PREV VISIT EST AGE 40-64: CPT | Mod: 25 | Performed by: INTERNAL MEDICINE

## 2022-12-22 PROCEDURE — 36415 COLL VENOUS BLD VENIPUNCTURE: CPT | Performed by: INTERNAL MEDICINE

## 2022-12-22 PROCEDURE — 80061 LIPID PANEL: CPT | Performed by: INTERNAL MEDICINE

## 2022-12-22 RX ORDER — LEVALBUTEROL TARTRATE 45 UG/1
AEROSOL, METERED ORAL
Qty: 15 G | Refills: 11 | Status: SHIPPED | OUTPATIENT
Start: 2022-12-22 | End: 2023-11-26

## 2022-12-22 NOTE — PROGRESS NOTES
SUBJECTIVE:   CC: Vinod is an 52 year old who presents for preventative health visit.     Patient has been advised of split billing requirements and indicates understanding: Yes  Healthy Habits:     Getting at least 3 servings of Calcium per day:  NO    Bi-annual eye exam:  Yes    Dental care twice a year:  Yes    Sleep apnea or symptoms of sleep apnea:  Sleep apnea (mild, no cpap needed)    Diet:  Regular (no restrictions)    Frequency of exercise:  None    Taking medications regularly:  7    Barriers to taking medications:  Other    PHQ-2 Total Score: 0    Additional concerns today:  Yes (L shoulder, ortho referral)  History of Present Illness       Reason for visit:  Annual physical    He eats 0-1 servings of fruits and vegetables daily.He consumes 3 sweetened beverage(s) daily.He exercises with enough effort to increase his heart rate 9 or less minutes per day.  He exercises with enough effort to increase his heart rate 3 or less days per week. He is missing 7 dose(s) of medications per week.  He is not taking prescribed medications regularly due to other.        Today's PHQ-2 Score:   PHQ-2 ( 1999 Pfizer) 12/20/2022   Q1: Little interest or pleasure in doing things 0   Q2: Feeling down, depressed or hopeless 0   PHQ-2 Score 0   PHQ-2 Total Score (12-17 Years)- Positive if 3 or more points; Administer PHQ-A if positive -   Q1: Little interest or pleasure in doing things Not at all   Q2: Feeling down, depressed or hopeless Not at all   PHQ-2 Score 0       Have you ever done Advance Care Planning? (For example, a Health Directive, POLST, or a discussion with a medical provider or your loved ones about your wishes): No, advance care planning information given to patient to review.  Patient plans to discuss their wishes with loved ones or provider.      Social History     Tobacco Use     Smoking status: Former     Packs/day: 1.00     Years: 6.00     Pack years: 6.00     Types: Cigarettes     Quit date: 1/1/1995      "Years since quittin.9     Smokeless tobacco: Never   Substance Use Topics     Alcohol use: Yes     Alcohol/week: 0.8 - 1.7 standard drinks     Types: 1 - 2 Standard drinks or equivalent per week     Comment: once weekly     If you drink alcohol do you typically have >3 drinks per day or >7 drinks per week? No    Alcohol Use 2022   Prescreen: >3 drinks/day or >7 drinks/week? No       Last PSA:   PSA   Date Value Ref Range Status   2020 1.47 0 - 4 ug/L Final     Comment:     Assay Method:  Chemiluminescence using Siemens Vista analyzer       Reviewed orders with patient. Reviewed health maintenance and updated orders accordingly - Yes  Lab work is in process  Labs reviewed in EPIC    Reviewed and updated as needed this visit by clinical staff   Tobacco   Meds              Reviewed and updated as needed this visit by Provider                     Review of Systems  CONSTITUTIONAL: NEGATIVE for fever, chills, change in weight  INTEGUMENTARY/SKIN: NEGATIVE for worrisome rashes, moles or lesions  EYES: NEGATIVE for vision changes or irritation  ENT: NEGATIVE for ear, mouth and throat problems  RESP: NEGATIVE for significant cough or SOB  CV: NEGATIVE for chest pain, palpitations or peripheral edema  GI: NEGATIVE for nausea, abdominal pain, heartburn, or change in bowel habits   male: negative for dysuria, hematuria, decreased urinary stream, erectile dysfunction, urethral discharge  MUSCULOSKELETAL:L shoulder pain  NEURO: NEGATIVE for weakness, dizziness or paresthesias  PSYCHIATRIC: NEGATIVE for changes in mood or affect    OBJECTIVE:   /76   Pulse 79   Temp 97  F (36.1  C) (Temporal)   Resp 14   Ht 1.803 m (5' 11\")   Wt 96.7 kg (213 lb 3.2 oz)   SpO2 95%   BMI 29.74 kg/m      Physical Exam  GENERAL: healthy, alert and no distress  EYES: Eyes grossly normal to inspection, PERRL and conjunctivae and sclerae normal  HENT: ear canals and TM's normal, nose and mouth without ulcers or " lesions  NECK: no adenopathy, no asymmetry, masses, or scars and thyroid normal to palpation  RESP: lungs clear to auscultation - no rales, rhonchi or wheezes  CV: regular rate and rhythm, normal S1 S2, no S3 or S4, no murmur, click or rub, no peripheral edema and peripheral pulses strong  ABDOMEN: soft, nontender, no hepatosplenomegaly, no masses and bowel sounds normal  MS: L shoulder pain w/abduction > 90 d, pain with int rotation. There is a circular protrusion , similar in appearance to a lipoma, but without any palpable mass posterior to the scapula on L upper back.   SKIN: no suspicious lesions or rashes  NEURO: Normal strength and tone, mentation intact and speech normal  PSYCH: mentation appears normal, affect normal/bright  LYMPH: no cervical, supraclavicular, axillary, or inguinal adenopathy    No results found for this or any previous visit (from the past 24 hour(s)).    ASSESSMENT/PLAN:       ICD-10-CM    1. Routine general medical examination at a health care facility  Z00.00       2. Unsp inj musc/tend the rotator cuff of l shoulder, init  S46.002A Physical Therapy Referral     Orthopedic  Referral      3. Mild intermittent asthma, unspecified whether complicated  J45.20 levalbuterol (XOPENEX HFA) 45 MCG/ACT inhaler      4. Need for vaccination  Z23 Pneumococcal vaccine 23 valent PPSV23  (Pneumovax)      5. Need for hepatitis C screening test  Z11.59 Hepatitis C Screen Reflex to HCV RNA Quant and Genotype     Hepatitis C Screen Reflex to HCV RNA Quant and Genotype      6. Screening for diabetes mellitus  Z13.1 Basic metabolic panel  (Ca, Cl, CO2, Creat, Gluc, K, Na, BUN)     Basic metabolic panel  (Ca, Cl, CO2, Creat, Gluc, K, Na, BUN)      7. Prostate cancer screening  Z12.5 PSA, screen     PSA, screen      8. Lipid screening  Z13.220 Lipid panel reflex to direct LDL Fasting     Lipid panel reflex to direct LDL Fasting        -Updated screening, immunizations, prevention.  Please see health  maintenance list, care gaps  -asthma well controlled w/only rescue inhaler.   -see physical therapy and sports med     Patient has been advised of split billing requirements and indicates understanding: Yes      COUNSELING:   Reviewed preventive health counseling, as reflected in patient instructions        He reports that he quit smoking about 27 years ago. He has a 6.00 pack-year smoking history. He has never used smokeless tobacco.            William Green MD  [facility]

## 2023-01-12 ENCOUNTER — OFFICE VISIT (OUTPATIENT)
Dept: ORTHOPEDICS | Facility: CLINIC | Age: 53
End: 2023-01-12
Attending: INTERNAL MEDICINE
Payer: COMMERCIAL

## 2023-01-12 ENCOUNTER — ANCILLARY PROCEDURE (OUTPATIENT)
Dept: GENERAL RADIOLOGY | Facility: CLINIC | Age: 53
End: 2023-01-12
Attending: FAMILY MEDICINE
Payer: COMMERCIAL

## 2023-01-12 VITALS
HEIGHT: 71 IN | BODY MASS INDEX: 30.13 KG/M2 | SYSTOLIC BLOOD PRESSURE: 154 MMHG | WEIGHT: 215.2 LBS | DIASTOLIC BLOOD PRESSURE: 78 MMHG

## 2023-01-12 DIAGNOSIS — M25.512 ACUTE PAIN OF LEFT SHOULDER: ICD-10-CM

## 2023-01-12 DIAGNOSIS — R22.9 MASS OF SUBCUTANEOUS TISSUE: ICD-10-CM

## 2023-01-12 DIAGNOSIS — M25.512 ACUTE PAIN OF LEFT SHOULDER: Primary | ICD-10-CM

## 2023-01-12 PROCEDURE — 99204 OFFICE O/P NEW MOD 45 MIN: CPT | Performed by: FAMILY MEDICINE

## 2023-01-12 PROCEDURE — 73030 X-RAY EXAM OF SHOULDER: CPT | Mod: TC | Performed by: RADIOLOGY

## 2023-01-12 RX ORDER — DICLOFENAC SODIUM 75 MG/1
75 TABLET, DELAYED RELEASE ORAL 2 TIMES DAILY
Qty: 30 TABLET | Refills: 0 | Status: SHIPPED | OUTPATIENT
Start: 2023-01-12 | End: 2024-02-15

## 2023-01-12 NOTE — LETTER
"    1/12/2023         RE: Enrico Pedroza  [address] [id number]        Dear Colleague,    Thank you for referring your patient, Enrico Pedroza, to the [facility] [place]. Please see a copy of my visit note below.    CHIEF COMPLAINT:  Pain of the Left Shoulder     HISTORY OF PRESENT ILLNESS  Mr. Pedroza is a pleasant 52 year old right hand dominant male who presents to clinic today with left shoulder pain.  Enrico explains that he has had pain for a few weeks, this morning fell on driveway and worsened pain. Intermittent pain with abduction, now constant. Pain with sleeping for longer.     Also notes that he has had a \"lump\" under shoulder blade. Hx of lipoma of neck.  Unsure if it has increased in size, present for months.    Onset: Acute on chronic  Location: left shoulder  Quality:  aching, dull and sharp  Severity: 7/10 at worst  Timing:constant  Modifying factors:  resting and non-use makes it better, movement and use makes it worse  Associated signs & symptoms: pain, limited ROM due to pain  Previous similar pain: No  Treatments to date: none    Additional history: as documented    Review of Systems:    Have you recently had a a fever, chills, weight loss? No    Do you have any vision problems? No    Do you have any chest pain or edema? No    Do you have any shortness of breath or wheezing?  No    Do you have stomach problems? No    Do you have any numbness or focal weakness? No    Do you have diabetes? No    Do you have problems with bleeding or clotting? No    Do you have an rashes or other skin lesions? No      MEDICAL HISTORY  Patient Active Problem List   Diagnosis     Allergic rhinitis     Lumbago     CARDIOVASCULAR SCREENING; LDL GOAL LESS THAN 160     Elbow pain     Moderate persistent asthma     Ureteral stone     Chronic diarrhea     Meniere's disease, bilateral     History of COVID-19       Current Outpatient Medications   Medication Sig " Dispense Refill     levalbuterol (XOPENEX HFA) 45 MCG/ACT inhaler INHALE 1 TO 2 PUFFS INTO THE LUNGS EVERY 4 HOURS AS NEEDED FOR SHORTNESS OF BREATH OR DIFFICULT BREATHING OR WHEEZING Strength: 45 MCG/ACT 15 g 11     meclizine (ANTIVERT) 25 MG tablet Take 25 mg by mouth as needed for dizziness        omeprazole (PRILOSEC) 20 MG DR capsule Take 20 mg by mouth daily as needed (heartburn)          Allergies   Allergen Reactions     Albuterol Difficulty breathing     Lung pain      Other [No Clinical Screening - See Comments] Difficulty breathing     Habanero peppers and ingredients in Orange Crush; SOB, sweating       Family History   Problem Relation Age of Onset     Diabetes Other         cousin and grandfather     C.A.D. Paternal Grandfather          MI     Breast Cancer Sister      Hypertension Mother      Breast Cancer Mother      Coronary Artery Disease Father      Prostate Cancer Father        Additional medical/Social/Surgical histories reviewed in [facility] and updated as appropriate.       PHYSICAL EXAM  There were no vitals taken for this visit.    General  - normal appearance, in no obvious distress  Musculoskeletal - left shoulder  - inspection: normal bone and joint alignment, no obvious deformity, no scapular winging, no AC step-off  - palpation: mildly tender RC insertion, normal clavicle, non-tender AC. Palpable freely moveable 2cm mass overlying spine of left scapula.  No overlying skin changes.   - ROM:  painful but full flexion and ER at end range, IR intact.  - strength: 5/5  strength, 5/5 in all shoulder planes  - special tests:  (-) Speed's  (+) Neer  (+) Hawkin's  (+) Hazel's  (-) Christian's  (-) apprehension  (-) subscap lift-off  Neuro  - no sensory or motor deficit, grossly normal coordination, normal muscle tone  Skin  - no ecchymosis, erythema, warmth, or induration, no obvious rash  Psych  - interactive, appropriate, normal mood and affect     IMAGING :X Final results and  radiologist's interpretation, available in the [facility] health record. Images were reviewed with the patient/family members in the office today. My personal interpretation of the performed imaging is no acute osseous abnormality or significant degenerative changes of joint.       ASSESSMENT & PLAN  Mr. Pedroza is a 52 year old year old male who presents to clinic today with acute on chronic left shoulder pain, subcutaneous mass of left shoulder present for months.    Concerning for cuff tendinopath/strain with fall.  Nodule most likely lipoma, will confirm with US.    Diagnosis:   (M25.512) Acute pain of left shoulder  (primary encounter diagnosis)    Plan: PT referral, start diclofenac (VOLTAREN) 75 MG EC tablet, follow up 6 weeks and consider CSI or MRI if persisting.    (R22.9) Mass of subcutaneous tissue  Plan: US Upper Extremity Non Vascular Left.     It was a pleasure seeing Enrico today.    Hany Freeman DO, [facility]  Primary Care Sports Medicine      Again, thank you for allowing me to participate in the care of your patient.        Sincerely,        Hany Freeman DO

## 2023-01-12 NOTE — PATIENT INSTRUCTIONS
Thank you for choosing [facility]    DR CAMPA[facility] CLINIC LOCATIONS  [place] [address] [address] [facility], 4th [place]   [place], [place], [address] [place] [address]   [phone]       APPOINTMENTS: [phone]    CARE QUESTIONS: [phone],    BILLING QUESTIONS: [phone]    FAX NUMBER: [phone]        Follow up: Dr. Freeman will send you a Wordster message with the results of your ultrasound.       1. Acute pain of left shoulder    2. Mass of subcutaneous tissue          Physical Therapy orders have been placed with [facility] ([facility])  You can call [phone] to schedule at your convenience.     An order for an ultrasound was placed today. You may call directly to schedule at [phone] at your convenience.       Rotator Cuff Injury     WHAT IS A ROTATOR CUFF INJURY?    A rotator cuff injury is irritation of or damage to the group of tendons and muscles that hold your shoulder joint together. Tendons are strong bands of tissue that attach muscle to bone. You use the muscles and tendons in your shoulder joint to move your shoulder and raise your arm over your head.        WHAT IS THE CAUSE?    A rotator cuff injury can be caused by:    Overuse of your shoulder in a sport or work activity that involves repetitive overhead movement of your shoulder, like swimming, baseball (mainly pitching), football, tennis, painting, plastering, or housework.  A sudden activity that twists your shoulder or tears your tendon, such as using your arm to break a fall, falling onto your arm, or lifting a heavy object  You may be at higher risk for a rotator cuff injury if you have poor head and shoulder posture, especially if you are older.    WHAT ARE THE SYMPTOMS?    Symptoms may include:    Pain and weakness in your arm and shoulder  Loss of shoulder movement, especially when you  try to raise your arm overhead    HOW IS IT DIAGNOSED?    Your healthcare provider will ask about your symptoms, activities, and medical history and examine you. You may have X-rays or other scans or procedures, such as:    An ultrasound, which uses sound waves to show pictures of your shoulder joint  An MRI, which uses a strong magnetic field and radio waves to show detailed pictures of your shoulder joint  An arthrogram, which is an X-ray or MRI taken after a dye is injected into your joint to outline its shape  Arthroscopy, which is a type of surgery done with a small scope inserted into your joint so your provider can look directly at your joint    HOW IS IT TREATED?    You will need to change or stop doing the activities that cause pain until your injury has healed. For example, avoid strenuous activity or any overhead motion that causes pain. Also, try to make sure that you are practicing good posture and are not slouching forward.    Your healthcare provider may recommend stretching and strengthening exercises to help you heal.    If you have a bad tear, you may need to have it repaired with surgery. After surgery, your treatment plan will include physical therapy to strengthen your shoulder as it heals.    The pain often gets better within a few weeks with self-care, but some injuries may take several months or longer to heal. It s important to follow all of your healthcare provider s instructions.    HOW CAN I TAKE CARE OF MYSELF?    To keep swelling down and help relieve pain:    Put an ice pack, gel pack, or package of frozen vegetables wrapped in a cloth on the area every 3 to 4 hours for up to 20 minutes at a time.  Take nonprescription pain medicine, such as acetaminophen, ibuprofen, or naproxen. Nonsteroidal anti-inflammatory medicines (NSAIDs), such as ibuprofen and naproxen, may cause stomach bleeding and other problems. These risks increase with age. Read the label and take as directed. Unless  recommended by your healthcare provider, you should not take this medicine for more than 10 days.  Moist heat may help relieve pain, relax your muscles, and make it easier to move your arm and shoulder. Put moist heat on the injured area for 10 to 15 minutes before you do warm-up and stretching exercises. Moist heat includes heat patches or moist heating pads that you can buy at most drugstores, a warm wet washcloth, or a hot shower. To prevent burns to your skin, follow directions on the package and do not lie on any type of hot pad. Don t use heat if you have swelling.    Follow your healthcare provider's instructions, including any exercises recommended by your provider. Ask your provider:    How and when you will hear your test results  How long it will take to recover  What activities you should avoid, including how much you can lift, and when you can return to your normal activities  How to take care of yourself at home  What symptoms or problems you should watch for and what to do if you have them  Make sure you know when you should come back for a checkup.    HOW CAN I HELP PREVENT A ROTATOR CUFF INJURY?    Warm-up exercises and stretching before activities can help prevent injuries. For example, do exercises that strengthen your shoulder muscles. If your arm or shoulder hurts after exercise, putting ice on it may help keep it from getting injured.    Follow safety rules and use any protective equipment recommended for your work or sport.    Avoid activities that cause pain. For example, avoid lifting heavy objects over your head.    Developed by [employer].  Published by [employer].  Copyright  2014 [facility] and/or one of its subsidiaries. All rights reserved.    Rotator Cuff Exercises    Isometric shoulder external rotation:  a doorway with your elbow bent 90 degrees and the back of the wrist on your injured side pressed against the door frame. Try to press your hand outward into the  door frame. Hold for 5 seconds. Do 2 sets of 15.    Isometric shoulder internal rotation:  a doorway with your elbow bent 90 degrees and the front of the wrist on your injured side pressed against the door frame. Try to press your palm into the door frame. Hold for 5 seconds. Do 2 sets of 15.  Wand exercise, flexion: Stand upright and hold a stick in both hands, palms down. Stretch your arms by lifting them over your head, keeping your arms straight. Hold for 5 seconds and return to the starting position. Repeat 10 times.    Wand exercise, extension: Stand upright and hold a stick in both hands behind your back. Move the stick away from your back. Hold this position for 5 seconds. Relax and return to the starting position. Repeat 10 times.  Wand exercise, external rotation: Lie on your back and hold a stick in both hands, palms up. Your upper arms should be resting on the floor with your elbows at your sides and bent 90 degrees. Use your uninjured arm to push your injured arm out away from your body. Keep the elbow of your injured arm at your side while it is being pushed. Hold the stretch for 5 seconds. Repeat 10 times.    Wand exercise, shoulder abduction and adduction: Stand and hold a stick with both hands, palms facing away from your body. Rest the stick against the front of your thighs. Use your uninjured arm to push your injured arm out to the side and up as high as possible. Keep your arms straight. Hold for 5 seconds. Repeat 10 times.    Resisted shoulder external rotation: Stand sideways next to a door with your injured arm farther from the door. Tie a knot in the end of the tubing and shut the knot in the door at waist level (or use cable weight machine at gym). Hold the other end of the tubing with the hand of your injured arm. Rest the hand of your injured arm across your stomach. Keeping your elbow in at your side, rotate your arm outward and away from your waist. Slowly return your arm to the  starting position. Make sure you keep your elbow bent 90 degrees and your forearm parallel to the floor. Repeat 10 times. Build up to 2 sets of 15.    Resisted shoulder internal rotation: Stand sideways next to a door with your injured arm closest to the door. Tie a knot in the end of the tubing and shut the knot in the door at waist level (or use cable weight machine at gym). Hold the other end of the tubing with the hand of your injured arm. Bend the elbow of your injured arm 90 degrees. Keeping your elbow in at your side, rotate your forearm across your body and then slowly back to the starting position. Make sure you keep your forearm parallel to the floor. Do 2 sets of 8 to 12.    Scaption: Stand with your arms at your sides and with your elbows straight. Slowly raise your arms to eye level. As you raise your arms, spread them apart so that they are only slightly in front of your body (at about a 30-degree angle to the front of your body). Point your thumbs toward the ceiling. Hold for 2 seconds and lower your arms slowly. Do 2 sets of 15. Progress to holding a soup can or light weight when you are doing the exercise and increase the weight as the exercise gets easier.    Side-lying external rotation: Lie on your uninjured side with your injured arm at your side and your elbow bent 90 degrees. Keeping your elbow against your side, raise your forearm toward the ceiling and hold for 2 seconds. Slowly lower your arm. Do 2 sets of 15. You can start doing this exercise holding a soup can or light weight and gradually increase the weight as long as there is no pain.    Horizontal abduction: Lie on your stomach on a table or the edge of a bed with the arm on your injured side hanging down over the edge. Raise your arm out to the side, with your thumb pointed toward the ceiling, until your arm is parallel to the floor. Hold for 2 seconds and then lower it slowly. Start this exercise with no weight. As you get stronger,  "add a light weight or hold a soup can. Do 2 sets of 15.    Push-up with a plus: Begin on the floor on your hands and knees. Keep your hands a shoulder width apart and lift your feet off the floor. Arch your back as high as possible and round your shoulders (this is the \"plus\" part or the exercise). Bend your elbows and lower your body to the floor. Return to the starting position and arch your back again. Do 2 sets of 15.          "

## 2023-01-12 NOTE — PROGRESS NOTES
"CHIEF COMPLAINT:  Pain of the Left Shoulder     HISTORY OF PRESENT ILLNESS  Mr. Pedroza is a pleasant 52 year old right hand dominant male who presents to clinic today with left shoulder pain.  Enrico explains that he has had pain for a few weeks, this morning fell on driveway and worsened pain. Intermittent pain with abduction, now constant. Pain with sleeping for longer.     Also notes that he has had a \"lump\" under shoulder blade. Hx of lipoma of neck.  Unsure if it has increased in size, present for months.    Onset: Acute on chronic  Location: left shoulder  Quality:  aching, dull and sharp  Severity: 7/10 at worst  Timing:constant  Modifying factors:  resting and non-use makes it better, movement and use makes it worse  Associated signs & symptoms: pain, limited ROM due to pain  Previous similar pain: No  Treatments to date: none    Additional history: as documented    Review of Systems:    Have you recently had a a fever, chills, weight loss? No    Do you have any vision problems? No    Do you have any chest pain or edema? No    Do you have any shortness of breath or wheezing?  No    Do you have stomach problems? No    Do you have any numbness or focal weakness? No    Do you have diabetes? No    Do you have problems with bleeding or clotting? No    Do you have an rashes or other skin lesions? No      MEDICAL HISTORY  Patient Active Problem List   Diagnosis     Allergic rhinitis     Lumbago     CARDIOVASCULAR SCREENING; LDL GOAL LESS THAN 160     Elbow pain     Moderate persistent asthma     Ureteral stone     Chronic diarrhea     Meniere's disease, bilateral     History of COVID-19       Current Outpatient Medications   Medication Sig Dispense Refill     levalbuterol (XOPENEX HFA) 45 MCG/ACT inhaler INHALE 1 TO 2 PUFFS INTO THE LUNGS EVERY 4 HOURS AS NEEDED FOR SHORTNESS OF BREATH OR DIFFICULT BREATHING OR WHEEZING Strength: 45 MCG/ACT 15 g 11     meclizine (ANTIVERT) 25 MG tablet Take 25 mg by mouth as " needed for dizziness        omeprazole (PRILOSEC) 20 MG DR capsule Take 20 mg by mouth daily as needed (heartburn)          Allergies   Allergen Reactions     Albuterol Difficulty breathing     Lung pain      Other [No Clinical Screening - See Comments] Difficulty breathing     Habanero peppers and ingredients in Orange Crush; SOB, sweating       Family History   Problem Relation Age of Onset     Diabetes Other         cousin and grandfather     C.A.D. Paternal Grandfather          MI     Breast Cancer Sister      Hypertension Mother      Breast Cancer Mother      Coronary Artery Disease Father      Prostate Cancer Father        Additional medical/Social/Surgical histories reviewed in [facility] and updated as appropriate.       PHYSICAL EXAM  There were no vitals taken for this visit.    General  - normal appearance, in no obvious distress  Musculoskeletal - left shoulder  - inspection: normal bone and joint alignment, no obvious deformity, no scapular winging, no AC step-off  - palpation: mildly tender RC insertion, normal clavicle, non-tender AC. Palpable freely moveable 2cm mass overlying spine of left scapula.  No overlying skin changes.   - ROM:  painful but full flexion and ER at end range, IR intact.  - strength: 5/5  strength, 5/5 in all shoulder planes  - special tests:  (-) Speed's  (+) Neer  (+) Hawkin's  (+) Hazel's  (-) Sikes's  (-) apprehension  (-) subscap lift-off  Neuro  - no sensory or motor deficit, grossly normal coordination, normal muscle tone  Skin  - no ecchymosis, erythema, warmth, or induration, no obvious rash  Psych  - interactive, appropriate, normal mood and affect     IMAGING :X Final results and radiologist's interpretation, available in the [facility] health record. Images were reviewed with the patient/family members in the office today. My personal interpretation of the performed imaging is no acute osseous abnormality or significant degenerative changes of joint.        ASSESSMENT & PLAN  Mr. Pedroza is a 52 year old year old male who presents to clinic today with acute on chronic left shoulder pain, subcutaneous mass of left shoulder present for months.    Concerning for cuff tendinopath/strain with fall.  Nodule most likely lipoma, will confirm with US.    Diagnosis:   (M25.512) Acute pain of left shoulder  (primary encounter diagnosis)    Plan: PT referral, start diclofenac (VOLTAREN) 75 MG EC tablet, follow up 6 weeks and consider CSI or MRI if persisting.    (R22.9) Mass of subcutaneous tissue  Plan: US Upper Extremity Non Vascular Left.     It was a pleasure seeing Enrico today.    Hany Freeman DO, CAQSM  Primary Care Sports Medicine

## 2023-01-17 ENCOUNTER — HOSPITAL ENCOUNTER (OUTPATIENT)
Dept: ULTRASOUND IMAGING | Facility: CLINIC | Age: 53
Discharge: HOME | End: 2023-01-17
Attending: FAMILY MEDICINE | Admitting: FAMILY MEDICINE
Payer: COMMERCIAL

## 2023-01-17 DIAGNOSIS — R22.9 MASS OF SUBCUTANEOUS TISSUE: ICD-10-CM

## 2023-01-17 DIAGNOSIS — R22.9 MASS OF SUBCUTANEOUS TISSUE: Primary | ICD-10-CM

## 2023-01-17 PROCEDURE — 76882 US LMTD JT/FCL EVL NVASC XTR: CPT | Mod: LT

## 2023-01-20 ENCOUNTER — THERAPY VISIT (OUTPATIENT)
Dept: PHYSICAL THERAPY | Facility: CLINIC | Age: 53
End: 2023-01-20
Attending: FAMILY MEDICINE
Payer: COMMERCIAL

## 2023-01-20 DIAGNOSIS — M25.512 CHRONIC LEFT SHOULDER PAIN: ICD-10-CM

## 2023-01-20 DIAGNOSIS — G89.29 CHRONIC LEFT SHOULDER PAIN: ICD-10-CM

## 2023-01-20 DIAGNOSIS — M25.512 ACUTE PAIN OF LEFT SHOULDER: ICD-10-CM

## 2023-01-20 PROCEDURE — 97110 THERAPEUTIC EXERCISES: CPT | Mod: GP | Performed by: PHYSICAL THERAPIST

## 2023-01-20 PROCEDURE — 97530 THERAPEUTIC ACTIVITIES: CPT | Mod: GP | Performed by: PHYSICAL THERAPIST

## 2023-01-20 PROCEDURE — 97161 PT EVAL LOW COMPLEX 20 MIN: CPT | Mod: GP | Performed by: PHYSICAL THERAPIST

## 2023-01-21 NOTE — PROGRESS NOTES
Physical Therapy Initial Evaluation    Physical Therapy Initial Examination/Evaluation  January 20, 2023    Enrico Pedroza  is a 52 year old  male referred to physical therapy by Dr. Hany Freeman for treatment of L shoulder pain.      DOI/onset 11-20-22  Mechanism of injury Patient has had a gradual onset of L sh pain over the past 2 months without any known incident.  DOS n/a  Prior treatment none.     Chief Complaint:   Restricted function due to L shoulder pain.   Pain location: L anterior GH joint  Quality: aching and sharp  Constant/Intermittent: sharp pain is intermittent  Time of day: no time pattern  Symptoms have worsened since onset.    Current pain 4/10.  Pain at best 1/10.  Pain at worst 5/10.    Symptoms aggravated by reaching up to the side, sleeping on L side.    Symptoms improved with rest.     Social history:  . Lives in own home with wife    Occupation: .  Job duties:  Computer work, prolonged sitting.    Patient having difficulty with ADLs: dressing.    Patient's goals are to reduce pain.    Patient reports general health as good.  Related medical history no hx of L shoulder problem. Patient has had a lipoma on the L scapula for years without any symptoms.  Surgical History:  none.    Imaging: x-rays (-).    Medications:  none.       Return to MD:  As needed.      Clinical Impression: Patient should respond well to continued PT intervention working to decrease pain and improve L shoulder strength and ROM through manual therapy and therapeutic exercise.    Subjective:    Patient Health History  Enrico Pedroza being seen for Shoulder pain.       Problem occurred: It has been gradual worsening. There hasn't been an incident that caused it.   Pain is reported as 5/10 on pain scale.  General health as reported by patient is good.  Pertinent medical history includes: asthma.     Medical allergies: other. Other medical allergies details: Albuterol.   Surgeries include:   Other. 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    Current medications:  Anti-inflammatory and other. Other medications details: Levalbuterol..    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   Primary job tasks include:  Computer work and prolonged sitting.                                    Objective:  Standing Alignment:      Shoulder/UE:  Rounded shoulders                  Flexibility/Screens:   Positive screens:  Shoulder  Upper Extremity:    Decreased left upper extremity flexibility at:  Pectoralis Major    Decreased right upper extremity flexibility present at:  Pectoralis Major                           Shoulder Evaluation:  ROM:  AROM:    Flexion:  Left:  173    Right:  125    Abduction:  Left: 175   Right:  60    Internal Rotation:  Left:  70    Right:  75  External Rotation:  Left:  90    Right:  53                      Strength:    Flexion: Left:4/5   Pain:      Extension:  Left: 4-/5    Pain:      Abduction:  Left: 4/5  Pain:        Internal Rotation:  Left:4+/5     Pain:      External Rotation:   Left:4+/5     Pain:     Horizontal Abduction:  Left:4-/5      Pain:+            Stability Testing:  normal      Special Tests:  Special tests assessed shoulder: (+) Hawkin's; empty can (-) L.  Left shoulder positive for the following special tests:  Impingement    Palpation:  Palpation assessed shoulder: tender at ant L GH joint.  Left shoulder tenderness present at:  Supraspinatus; Infraspinatus and Bicipital Groove    Mobility Tests:      Glenohumeral posterior left:  Hypomobile                                               General     ROS    Assessment/Plan:    Patient is a 52 year old male with left side shoulder complaints.    Patient has the following significant findings with corresponding treatment plan.                Diagnosis 1:  L shoulder pain  Pain -  manual therapy, self management and education  Decreased ROM/flexibility - manual therapy and  therapeutic exercise  Decreased strength - therapeutic exercise and therapeutic activities  Impaired muscle performance - neuro re-education  Decreased function - therapeutic activities    Therapy Evaluation Codes:   1) History comprised of:   Personal factors that impact the plan of care:      None.    Comorbidity factors that impact the plan of care are:      None.     Medications impacting care: None.  2) Examination of Body Systems comprised of:   Body structures and functions that impact the plan of care:      Shoulder.   Activity limitations that impact the plan of care are:      Dressing and Lifting.  3) Clinical presentation characteristics are:   Stable/Uncomplicated.  4) Decision-Making    Low complexity using standardized patient assessment instrument and/or measureable assessment of functional outcome.  Cumulative Therapy Evaluation is: Low complexity.    Previous and current functional limitations:  (See Goal Flow Sheet for this information)    Short term and Long term goals: (See Goal Flow Sheet for this information)     Communication ability:  Patient appears to be able to clearly communicate and understand verbal and written communication and follow directions correctly.  Treatment Explanation - The following has been discussed with the patient:   RX ordered/plan of care  Anticipated outcomes  Possible risks and side effects  This patient would benefit from PT intervention to resume normal activities.   Rehab potential is good.    Frequency:  3 X a month, once daily  Duration:  for 2 months  Discharge Plan:  Achieve all LTG.  Independent in home treatment program.  Reach maximal therapeutic benefit.    Please refer to the daily flowsheet for treatment today, total treatment time and time spent performing 1:1 timed codes.

## 2023-01-27 ENCOUNTER — THERAPY VISIT (OUTPATIENT)
Dept: PHYSICAL THERAPY | Facility: CLINIC | Age: 53
End: 2023-01-27
Payer: COMMERCIAL

## 2023-01-27 DIAGNOSIS — G89.29 CHRONIC LEFT SHOULDER PAIN: Primary | ICD-10-CM

## 2023-01-27 DIAGNOSIS — M25.512 CHRONIC LEFT SHOULDER PAIN: Primary | ICD-10-CM

## 2023-01-27 PROCEDURE — 97140 MANUAL THERAPY 1/> REGIONS: CPT | Mod: GP | Performed by: PHYSICAL THERAPIST

## 2023-01-27 PROCEDURE — 97110 THERAPEUTIC EXERCISES: CPT | Mod: GP | Performed by: PHYSICAL THERAPIST

## 2023-01-27 PROCEDURE — 97530 THERAPEUTIC ACTIVITIES: CPT | Mod: GP | Performed by: PHYSICAL THERAPIST

## 2023-02-10 ENCOUNTER — THERAPY VISIT (OUTPATIENT)
Dept: PHYSICAL THERAPY | Facility: CLINIC | Age: 53
End: 2023-02-10
Payer: COMMERCIAL

## 2023-02-10 DIAGNOSIS — G89.29 CHRONIC LEFT SHOULDER PAIN: Primary | ICD-10-CM

## 2023-02-10 DIAGNOSIS — M25.512 CHRONIC LEFT SHOULDER PAIN: Primary | ICD-10-CM

## 2023-02-10 PROCEDURE — 97110 THERAPEUTIC EXERCISES: CPT | Mod: 59 | Performed by: PHYSICAL THERAPIST

## 2023-02-10 PROCEDURE — 97140 MANUAL THERAPY 1/> REGIONS: CPT | Mod: 59 | Performed by: PHYSICAL THERAPIST

## 2023-02-10 PROCEDURE — 97530 THERAPEUTIC ACTIVITIES: CPT | Mod: GP | Performed by: PHYSICAL THERAPIST

## 2023-02-10 NOTE — PROGRESS NOTES
Subjective:  HPI  Physical Exam                    Objective:  System    Physical Exam    General     ROS    Assessment/Plan:    SUBJECTIVE  Subjective changes as noted by pt:   Pt. cont to report steady progress with L shoulder with decreasing pain and increasing strength and ROM.   Current pain level:  3/10   Changes in function:  Yes (See Goal flowsheet attached for changes in current functional level)     Adverse reaction to treatment or activity:  None    OBJECTIVE  Changes in objective findings:  AROM L sh flex 168; abd 150; ER 62. Strength L sh flex 4+/5; abd 4/5; IR 4/5; ER 4/5     ASSESSMENT  Enrico continues to require intervention to meet STG and LTG's: PT  Patient is progressing as expected.  Response to therapy has shown an improvement in  pain level, ROM  and strength  Progress made towards STG/LTG?  Yes (See Goal flowsheet attached for updates on achievement of STG and LTG)    PLAN  Current treatment program is being advanced to more complex exercises.    PTA/ATC plan:  N/A    Please refer to the daily flowsheet for treatment today, total treatment time and time spent performing 1:1 timed codes.

## 2023-03-13 ENCOUNTER — THERAPY VISIT (OUTPATIENT)
Dept: PHYSICAL THERAPY | Facility: CLINIC | Age: 53
End: 2023-03-13
Payer: COMMERCIAL

## 2023-03-13 DIAGNOSIS — M25.512 CHRONIC LEFT SHOULDER PAIN: Primary | ICD-10-CM

## 2023-03-13 DIAGNOSIS — G89.29 CHRONIC LEFT SHOULDER PAIN: Primary | ICD-10-CM

## 2023-03-13 PROCEDURE — 97530 THERAPEUTIC ACTIVITIES: CPT | Mod: GP | Performed by: PHYSICAL THERAPIST

## 2023-03-13 PROCEDURE — 97110 THERAPEUTIC EXERCISES: CPT | Mod: GP | Performed by: PHYSICAL THERAPIST

## 2023-03-13 PROCEDURE — 97140 MANUAL THERAPY 1/> REGIONS: CPT | Mod: GP | Performed by: PHYSICAL THERAPIST

## 2023-03-13 NOTE — PROGRESS NOTES
Subjective:  HPI  Physical Exam                    Objective:  System    Physical Exam    General     ROS    Assessment/Plan:    SUBJECTIVE  Subjective changes as noted by pt:   Pt. cont to note improvement but admits as the shoulder has felt better he has gotten lacks about doing his ex's.   Current pain level:  2/10   Changes in function:  Yes (See Goal flowsheet attached for changes in current functional level)     Adverse reaction to treatment or activity:  None    OBJECTIVE  Changes in objective findings:  AROM L sh flex 172; abd 170; ER 71. Strength L sh flex 4+/5; abd 4/5; ER 4+/5     ASSESSMENT  Enrico continues to require intervention to meet STG and LTG's: PT  Patient is progressing as expected.  Response to therapy has shown an improvement in  pain level, ROM  and strength  Progress made towards STG/LTG?  Yes (See Goal flowsheet attached for updates on achievement of STG and LTG)    PLAN  Current treatment program is being advanced to more complex exercises.    PTA/ATC plan:  N/A    Please refer to the daily flowsheet for treatment today, total treatment time and time spent performing 1:1 timed codes.

## 2023-06-06 ENCOUNTER — OFFICE VISIT (OUTPATIENT)
Dept: FAMILY MEDICINE | Facility: CLINIC | Age: 53
End: 2023-06-06
Payer: COMMERCIAL

## 2023-06-06 DIAGNOSIS — L91.8 INFLAMED ACROCHORDON: Primary | ICD-10-CM

## 2023-06-06 DIAGNOSIS — R22.9 MASS OF SUBCUTANEOUS TISSUE: ICD-10-CM

## 2023-06-06 DIAGNOSIS — L81.4 SOLAR LENTIGINOSIS: ICD-10-CM

## 2023-06-06 DIAGNOSIS — D22.9 MULTIPLE PIGMENTED NEVI: ICD-10-CM

## 2023-06-06 PROCEDURE — 99203 OFFICE O/P NEW LOW 30 MIN: CPT | Mod: 25 | Performed by: PHYSICIAN ASSISTANT

## 2023-06-06 PROCEDURE — 11200 RMVL SKIN TAGS UP TO&INC 15: CPT | Performed by: PHYSICIAN ASSISTANT

## 2023-06-06 NOTE — PROGRESS NOTES
[facility] Dermatology Note  Encounter Date: Jun 6, 2023  Office Visit     Dermatology Problem List:  1. Inflamed acrochordon- cryotherapy 6/6/23  2. Soft tissue mass left scapula, MRI    ____________________________________________    Assessment & Plan:     # Inflamed acrochordons, neck  Because these are clinically bothersome and rubbing against clothing and causing discomfort, will treat in the office with cryotherapy.  See procedure below.    # Soft tissue mass, left scapula, likely lipoma. Growing and causing pain. R/o other causes such as liposarcoma vs Fibrolipomatous hamartoma vs other  Due to the size of this and symptoms, will continue with imaging, MRI ordered.   Will likely refer to general surgery.       Procedures Performed:   - Cryotherapy procedure note, location(s): neck. After verbal consent and discussion of risks and benefits including, but not limited to, dyspigmentation/scar, blister, and pain, 10 lesion(s) was(were) treated with 1-2 mm freeze border for 1-2 cycles with liquid nitrogen. Post cryotherapy instructions were provided.      Follow-up: 1 year(s) in-person, or earlier for new or changing lesions    Staff:     All risks, benefits and alternatives were discussed with patient.  Patient is in agreement and understands the assessment and plan.  All questions were answered.  Sun Screen Education was given.   Return to Clinic annually or sooner as needed.   Kylah Anaya PA-C   ____________________________________________    CC: Derm Problem (Lipoma on left scapula)    HPI:  Mr. Enrico Pedroza is a(n) 52 year old male who presents today as a new patient for a bump on his upper back back/ shoulder area. He reports the spot is getting larger. It is giving him symptoms of pain during reaching. He was recommended to come for evaluation. He had an US on his shoulder through his orthopedic revealing:     an Ill-defined soft tissue lesion in the left upper back,  measuring  4.0 cm in greatest dimension. This is nonspecific, with differential  considerations including lipoma and other more aggressive soft tissue  neoplasms. Continued clinical follow-up to ensure stability and/or MRI          Additionally he has irritating growths on his neck. They rub against his clothing and cause pain. They have bled in the past. There is a family history of Skin cancer: sister skin, Dad multiple, Maternal grandmother lots of cancers. Paternal grandparents also had skin cancer.         Patient is otherwise feeling well, without additional skin concerns.     Labs Reviewed:  N/A    Physical Exam:  Vitals: There were no vitals taken for this visit.  SKIN: Waist-up skin, which includes the head/face, neck, both arms, chest, back, abdomen, digits and/or nails was examined and groin. Defers further exam. Significant for:   - 7cm soft nodule at superior scapula.  - Multiple regular brown pigmented macules and papules are identified on the trunk and upper extremities.   Pedunculated skin colored papule(s) on the neck, some with erythema  Scattered brown macules on sun exposed areas..   - No other lesions of concern on areas examined.             Medications:  Current Outpatient Medications   Medication     diclofenac (VOLTAREN) 75 MG EC tablet     levalbuterol (XOPENEX HFA) 45 MCG/ACT inhaler     meclizine (ANTIVERT) 25 MG tablet     omeprazole (PRILOSEC) 20 MG DR capsule     No current facility-administered medications for this visit.      Past Medical History:   Patient Active Problem List   Diagnosis     Allergic rhinitis     Lumbago     CARDIOVASCULAR SCREENING; LDL GOAL LESS THAN 160     Elbow pain     Moderate persistent asthma     Ureteral stone     Chronic diarrhea     Meniere's disease, bilateral     History of COVID-19     Chronic left shoulder pain     Past Medical History:   Diagnosis Date     Allergic rhinitis, cause unspecified      Intermittent asthma     exercise/allergies/molds      Nephrolithiasis        CC Hany Freeman, DO  [address] [place] [address] on close of this encounter.

## 2023-06-06 NOTE — PATIENT INSTRUCTIONS
Cryotherapy    What is it?  Use of a very cold liquid, such as liquid nitrogen, to freeze and destroy abnormal skin cells that need to be removed    What should I expect?  Tenderness and redness  A small blister that might grow and fill with dark purple blood. There may be crusting.  More than one treatment may be needed if the lesions do not go away.    How do I care for the treated area?  Gently wash the area with your hands when bathing.  Use a thin layer of Vaseline to help with healing. You may use a Band-Aid.   The area should heal within 7-10 days and may leave behind a pink or lighter color.   Do not use an antibiotic or Neosporin ointment.   You may take acetaminophen (Tylenol) for pain.     Call your doctor if you have:  Severe pain  Signs of infection (warmth, redness, cloudy yellow drainage, and or a bad smell)  Questions or concerns    Who should I call with questions?      [facility]: [phone]      [facility]: [phone]      For urgent needs outside of business hours call the [facility] [[email or web address]] at [phone] and ask for the dermatology resident on call

## 2023-06-14 ENCOUNTER — TELEPHONE (OUTPATIENT)
Dept: FAMILY MEDICINE | Facility: CLINIC | Age: 53
End: 2023-06-14
Payer: COMMERCIAL

## 2023-06-14 NOTE — TELEPHONE ENCOUNTER
Called and spoke with pt and advised that they might not be ready to fall off yet. Discussed signs/symptoms of infections which pt denies other than pain. Advised to give the spots a bit more time to see if they will fall off, and to call us back if they do not.    Thank you,  Maricruz SONI RN  Dermatology   [phone]

## 2023-06-14 NOTE — TELEPHONE ENCOUNTER
[facility] Call Center    Phone Message    May a detailed message be left on voicemail: yes     Reason for Call: Other: Pt states he had some skin tags frozen off 6/6/23. He was told they were to fall off last week and he still has them, they have also become very painful. Please call Pt back to discuss, thanks!      Action Taken: Message routed to:  Other: [facility]     Travel Screening: Not Applicable

## 2023-06-30 ENCOUNTER — TELEPHONE (OUTPATIENT)
Dept: FAMILY MEDICINE | Facility: CLINIC | Age: 53
End: 2023-06-30
Payer: COMMERCIAL

## 2023-06-30 NOTE — TELEPHONE ENCOUNTER
I ordered an MRI on the left shoulder on 6/6/23. I don't see this was done. Does he need help scheduling?     Thanks,   Kylah Anaya PA-C       Called and left message for pt to schedule MRI of left shoulder ordered by Kylah on 6/6.  Advised can call [facility] Radiology at [phone] to schedule.  Can call [phone] and ask to speak with derm nurse if has questions.    Gertrude BATES RN  [facility] [place]  [phone]

## 2023-07-03 NOTE — TELEPHONE ENCOUNTER
patient has appointment scheduled for 7/10 for MRI per [facility].    Thank you,    Yulissa LUEVANORN BSN  [facility]- [phone]

## 2023-07-10 ENCOUNTER — ANCILLARY PROCEDURE (OUTPATIENT)
Dept: MRI IMAGING | Facility: CLINIC | Age: 53
End: 2023-07-10
Attending: PHYSICIAN ASSISTANT
Payer: COMMERCIAL

## 2023-07-10 DIAGNOSIS — R22.9 MASS OF SUBCUTANEOUS TISSUE: ICD-10-CM

## 2023-07-10 PROCEDURE — 73221 MRI JOINT UPR EXTREM W/O DYE: CPT | Mod: LT

## 2023-07-17 ENCOUNTER — TELEPHONE (OUTPATIENT)
Dept: FAMILY MEDICINE | Facility: CLINIC | Age: 53
End: 2023-07-17
Payer: COMMERCIAL

## 2023-07-17 DIAGNOSIS — D17.22 LIPOMA OF LEFT SHOULDER: Primary | ICD-10-CM

## 2023-07-17 NOTE — TELEPHONE ENCOUNTER
Pt reviewed results and message from yKlah Anaya PA-C via my chart.    Gertrude BATES RN  [facility] [place] [place]  [phone]

## 2023-07-17 NOTE — TELEPHONE ENCOUNTER
Left message for pt to call nurse at [phone].  Need to know if pt would like referral to general surgery for lipoma removal.    Gertrude BATES RN  [facility] [place]  [phone]

## 2023-07-17 NOTE — TELEPHONE ENCOUNTER
----- Message from Kylah Anaya PA-C sent at 7/14/2023  9:45 AM CDT -----  You can let Vinod know the MRI of his left shoulder showed:  1.there is a lipoma behind the scapula about at least 6 cm but not fully viewed  This can be excised. I would send you to general surgery for this due to the size.   Please let me know if you desire this referral.  2. There is a posterosuperior labral tear in your shoulder. I would discuss this with your orthopedic and see what their treatment options would be (monitor/ rest vs surgery). If they recommend surgery, discuss the lipoma with them as well, to see if this treatment could be done at the same time.    Thanks,  Kylah Anaya PA-C

## 2023-07-19 NOTE — TELEPHONE ENCOUNTER
Patient Contact    Attempt # 2    Was call answered?  No.  Left message on voicemail with information to call nurse back at [phone].     Gertrude BATES RN  [facility]  [phone]

## 2023-07-21 NOTE — TELEPHONE ENCOUNTER
Patient Contact     Attempt # 3     Was call answered?  No.  Left message on voicemail with information to call nurse back at [phone].      Gertrude BATES RN  [facility] [place]  [phone]

## 2023-08-15 ENCOUNTER — E-VISIT (OUTPATIENT)
Dept: INTERNAL MEDICINE | Facility: CLINIC | Age: 53
End: 2023-08-15
Payer: COMMERCIAL

## 2023-08-15 DIAGNOSIS — R06.02 SOB (SHORTNESS OF BREATH): Primary | ICD-10-CM

## 2023-08-15 PROCEDURE — 99207 PR NON-BILLABLE SERV PER CHARTING: CPT | Performed by: INTERNAL MEDICINE

## 2023-08-15 NOTE — PATIENT INSTRUCTIONS
Thank you for choosing us for your care. Based on the information provided, I believe you need to be seen today.  Please go urgent care as soon as possible.     You will not be charged for this eVisit.

## 2023-09-20 NOTE — TELEPHONE ENCOUNTER
9/20/23:  pt is returning call from 7/14/23 about MRI results and message from Kylah.  Pt would like the referral to general surgery for the lipoma on left shoulder.  Pt states he has questions like how big the scar will be and if he will get any relief from having it removed.  Nurse advised pt would have a general surgery consult first and his questions would be answered at that appt.    General Surgery referral pended.  Please add to order if needed.    Gertrude BATES RN  [facility] [place]  [phone]

## 2023-09-21 NOTE — TELEPHONE ENCOUNTER
REFERRAL INFORMATION:  Referring Provider: MONSE Oro  Referring Clinic: [facility] - Dermatology  Reason for Visit/Diagnosis: Lipoma of Left Shoulder       FUTURE VISIT INFORMATION:  Appointment Date: 9/22/2023  Appointment Time: 10:30 AM     NOTES RECORD STATUS  DETAILS   OFFICE NOTE from Referring Provider Internal [facility]:  7/17/23 - Telephone referral from MONSE Oro  6/6/23 - DERM OV with MONSE Oro   OFFICE NOTE from Other Specialists [facility] [place]:  1/12/23 - SPORT OV with Dr. Pete Ho - [place]:  2/2/22 - UC OV with MONSE Fabian   HOSPITAL DISCHARGE SUMMARY/ ED VISITS  N/A    OPERATIVE REPORT N/A    ENDOSCOPY (EGD)  Received [facility]:  10/8/19 - EGD   PERTINENT LABS Care Everywhere / Internal    IMAGING (CT, MRI, US, XR)  Received / Internal [facility]:  8/15/23 - XR Chest    [facility]:  7/10/23 - MRI Shoulder  1/17/23 - US Upper Extremity     Records Requested    Facility  [facility]  Fax: [phone]   Outcome * 9/21/23 9:15 AM Faxed urg req to  for images to be pushed to [facility]. - Lanny    * 9/21/23 1:14 PM Images received from  and attached to the patient in PACs. - Lanny

## 2023-09-22 ENCOUNTER — OFFICE VISIT (OUTPATIENT)
Dept: SURGERY | Facility: CLINIC | Age: 53
End: 2023-09-22
Attending: PHYSICIAN ASSISTANT
Payer: COMMERCIAL

## 2023-09-22 ENCOUNTER — PRE VISIT (OUTPATIENT)
Dept: SURGERY | Facility: CLINIC | Age: 53
End: 2023-09-22

## 2023-09-22 VITALS
HEART RATE: 79 BPM | DIASTOLIC BLOOD PRESSURE: 80 MMHG | OXYGEN SATURATION: 97 % | SYSTOLIC BLOOD PRESSURE: 115 MMHG | WEIGHT: 197.4 LBS | HEIGHT: 71 IN | BODY MASS INDEX: 27.64 KG/M2

## 2023-09-22 DIAGNOSIS — D17.22 LIPOMA OF LEFT SHOULDER: ICD-10-CM

## 2023-09-22 PROCEDURE — 99203 OFFICE O/P NEW LOW 30 MIN: CPT | Performed by: SURGERY

## 2023-09-22 ASSESSMENT — PAIN SCALES - GENERAL: PAINLEVEL: NO PAIN (0)

## 2023-09-22 NOTE — LETTER
9/22/2023       RE: Enrico Pedroza  [address]  [facility] [id number]     Dear Colleague,    Thank you for referring your patient, Enrico Pedroza, to the [facility] GENER[facility] SUR[facility] [place] [facility]. Please see a copy of my visit note below.    Patient here for assessment of shoulder issue. He has been having problems with his left shoulder for some time. He feels as if there is something underneath his scapula pushing his shoulder forward. On exam, he had been found to have a soft subcutaneous mass superficial to his scapula. This lead to an ultrasound which showed possible lipoma, but couldn't rule out other pathologies. He ended seeing dermatology, an MRI was done which did show a fatty mass of 5.7 cm in greatest diameter posterior to the scapula. This was felt to be consistent with a lipoma. He also had a posterosuperior labral tear.  On exam, patient has a subcutaneous mass on left shoulder, clinically consistent with lipoma. No other abnormalities noted.     A/p - 5.7 cm subcutaneous mass on left shoulder. It is unlikely that his symptoms are caused by his mass. He himself expressed interest in leaving the mass alone if it is not felt to be the cause of his symptoms. I have strongly encouraged him to go to his PCP to have his status reassessed. It is likely that he would then benefit from an orthopedic referral, though I will defer to his PCP.   With respect to the mass, though it is likely asymptomatic, its size is such that I have recommended that this be removed, based on remote chance of this being cancer. It is reasonable to have his shoulder issue addressed first. He will then get in touch with us if he would like to proceed.  Questions answered. He is understanding of discussion and agreeable with plan.       Again, thank you for allowing me to participate in the care of your patient.      Sincerely,    Van Meyers  MD [place]

## 2023-09-22 NOTE — NURSING NOTE
"Chief Complaint   Patient presents with    New Patient     Lipoma of left shoulder       Vitals:    09/22/23 1028   BP: 115/80   BP Location: Left arm   Patient Position: Sitting   Cuff Size: Adult Regular   Pulse: 79   SpO2: 97%   Weight: 89.5 kg (197 lb 6.4 oz)   Height: 1.803 m (5' 11\")       Body mass index is 27.53 kg/m .                          Wilfred Metzger, EMT    "

## 2023-09-22 NOTE — PATIENT INSTRUCTIONS
You met with Dr. Van Guy.      Today's visit instructions:    Please call the Nurse Advice line listed below if you decide to proceed with removal of your lipoma. Return to the Surgery Clinic on an as needed basis.        If you have questions please contact Catalina RN or Pam RN during regular clinic hours, Monday through Friday 7:30 AM - 4:00 PM, or you can contact us via [employer] at anytime.       If you have urgent needs after-hours, weekends, or holidays please call the hospital at [phone] and ask to speak with our on-call General Surgery Team.    Appointment [phone]  Nurse Advice (Catalina or Pam): [phone]   Surgery Scheduler ([place]): [phone]  Fax: [phone]

## 2023-09-23 NOTE — PROGRESS NOTES
Patient here for assessment of shoulder issue. He has been having problems with his left shoulder for some time. He feels as if there is something underneath his scapula pushing his shoulder forward. On exam, he had been found to have a soft subcutaneous mass superficial to his scapula. This lead to an ultrasound which showed possible lipoma, but couldn't rule out other pathologies. He ended seeing dermatology, an MRI was done which did show a fatty mass of 5.7 cm in greatest diameter posterior to the scapula. This was felt to be consistent with a lipoma. He also had a posterosuperior labral tear.  On exam, patient has a subcutaneous mass on left shoulder, clinically consistent with lipoma. No other abnormalities noted.     A/p - 5.7 cm subcutaneous mass on left shoulder. It is unlikely that his symptoms are caused by his mass. He himself expressed interest in leaving the mass alone if it is not felt to be the cause of his symptoms. I have strongly encouraged him to go to his PCP to have his status reassessed. It is likely that he would then benefit from an orthopedic referral, though I will defer to his PCP.   With respect to the mass, though it is likely asymptomatic, its size is such that I have recommended that this be removed, based on remote chance of this being cancer. It is reasonable to have his shoulder issue addressed first. He will then get in touch with us if he would like to proceed.  Questions answered. He is understanding of discussion and agreeable with plan.

## 2024-01-10 DIAGNOSIS — J45.20 MILD INTERMITTENT ASTHMA, UNSPECIFIED WHETHER COMPLICATED: ICD-10-CM

## 2024-01-10 RX ORDER — LEVALBUTEROL TARTRATE 45 UG/1
AEROSOL, METERED ORAL
Qty: 15 G | Refills: 0 | Status: SHIPPED | OUTPATIENT
Start: 2024-01-10 | End: 2024-02-02

## 2024-01-10 NOTE — TELEPHONE ENCOUNTER
Notified pt of refill ok and he will call back to schedule a follow up visit as he was driving at the moment and didn't have a calendar in front of him.

## 2024-01-16 ENCOUNTER — E-VISIT (OUTPATIENT)
Dept: INTERNAL MEDICINE | Facility: CLINIC | Age: 54
End: 2024-01-16
Payer: COMMERCIAL

## 2024-01-16 DIAGNOSIS — T75.3XXA SEA SICKNESS, INITIAL ENCOUNTER: ICD-10-CM

## 2024-01-16 PROCEDURE — 99421 OL DIG E/M SVC 5-10 MIN: CPT | Performed by: INTERNAL MEDICINE

## 2024-01-16 RX ORDER — SCOLOPAMINE TRANSDERMAL SYSTEM 1 MG/1
1 PATCH, EXTENDED RELEASE TRANSDERMAL
Qty: 5 PATCH | Refills: 0 | Status: SHIPPED | OUTPATIENT
Start: 2024-01-16 | End: 2024-02-15

## 2024-02-02 ENCOUNTER — MYC REFILL (OUTPATIENT)
Dept: INTERNAL MEDICINE | Facility: CLINIC | Age: 54
End: 2024-02-02
Payer: COMMERCIAL

## 2024-02-02 DIAGNOSIS — J45.20 MILD INTERMITTENT ASTHMA, UNSPECIFIED WHETHER COMPLICATED: ICD-10-CM

## 2024-02-02 RX ORDER — LEVALBUTEROL TARTRATE 45 UG/1
AEROSOL, METERED ORAL
Qty: 15 G | Refills: 0 | Status: SHIPPED | OUTPATIENT
Start: 2024-02-02 | End: 2024-02-15 | Stop reason: ALTCHOICE

## 2024-02-15 ENCOUNTER — OFFICE VISIT (OUTPATIENT)
Dept: INTERNAL MEDICINE | Facility: CLINIC | Age: 54
End: 2024-02-15
Payer: COMMERCIAL

## 2024-02-15 VITALS
RESPIRATION RATE: 17 BRPM | DIASTOLIC BLOOD PRESSURE: 76 MMHG | TEMPERATURE: 98.4 F | HEART RATE: 74 BPM | OXYGEN SATURATION: 97 % | HEIGHT: 71 IN | WEIGHT: 204.2 LBS | BODY MASS INDEX: 28.59 KG/M2 | SYSTOLIC BLOOD PRESSURE: 118 MMHG

## 2024-02-15 DIAGNOSIS — Z00.00 ROUTINE GENERAL MEDICAL EXAMINATION AT A HEALTH CARE FACILITY: ICD-10-CM

## 2024-02-15 DIAGNOSIS — S43.432A TEAR OF LEFT GLENOID LABRUM, INITIAL ENCOUNTER: ICD-10-CM

## 2024-02-15 DIAGNOSIS — Z13.220 LIPID SCREENING: ICD-10-CM

## 2024-02-15 DIAGNOSIS — J45.20 MILD INTERMITTENT ASTHMA WITHOUT COMPLICATION: ICD-10-CM

## 2024-02-15 DIAGNOSIS — Z13.1 SCREENING FOR DIABETES MELLITUS: ICD-10-CM

## 2024-02-15 DIAGNOSIS — Z82.79 FAMILY HISTORY OF BICUSPID AORTIC VALVE: Primary | ICD-10-CM

## 2024-02-15 PROBLEM — J44.9 COPD (CHRONIC OBSTRUCTIVE PULMONARY DISEASE) (H): Status: RESOLVED | Noted: 2024-02-15 | Resolved: 2024-02-15

## 2024-02-15 PROBLEM — J44.9 COPD (CHRONIC OBSTRUCTIVE PULMONARY DISEASE) (H): Status: ACTIVE | Noted: 2024-02-15

## 2024-02-15 LAB
ANION GAP SERPL CALCULATED.3IONS-SCNC: 8 MMOL/L (ref 7–15)
BUN SERPL-MCNC: 10.3 MG/DL (ref 6–20)
CALCIUM SERPL-MCNC: 9.8 MG/DL (ref 8.6–10)
CHLORIDE SERPL-SCNC: 108 MMOL/L (ref 98–107)
CHOLEST SERPL-MCNC: 206 MG/DL
CREAT SERPL-MCNC: 0.94 MG/DL (ref 0.67–1.17)
DEPRECATED HCO3 PLAS-SCNC: 27 MMOL/L (ref 22–29)
EGFRCR SERPLBLD CKD-EPI 2021: >90 ML/MIN/1.73M2
FASTING STATUS PATIENT QL REPORTED: YES
GLUCOSE SERPL-MCNC: 90 MG/DL (ref 70–99)
HDLC SERPL-MCNC: 67 MG/DL
LDLC SERPL CALC-MCNC: 121 MG/DL
NONHDLC SERPL-MCNC: 139 MG/DL
POTASSIUM SERPL-SCNC: 4.5 MMOL/L (ref 3.4–5.3)
SODIUM SERPL-SCNC: 143 MMOL/L (ref 135–145)
TRIGL SERPL-MCNC: 92 MG/DL

## 2024-02-15 PROCEDURE — 99396 PREV VISIT EST AGE 40-64: CPT | Performed by: INTERNAL MEDICINE

## 2024-02-15 PROCEDURE — 36415 COLL VENOUS BLD VENIPUNCTURE: CPT | Performed by: INTERNAL MEDICINE

## 2024-02-15 PROCEDURE — 99213 OFFICE O/P EST LOW 20 MIN: CPT | Mod: 25 | Performed by: INTERNAL MEDICINE

## 2024-02-15 PROCEDURE — 80061 LIPID PANEL: CPT | Performed by: INTERNAL MEDICINE

## 2024-02-15 PROCEDURE — 80048 BASIC METABOLIC PNL TOTAL CA: CPT | Performed by: INTERNAL MEDICINE

## 2024-02-15 RX ORDER — BUDESONIDE AND FORMOTEROL FUMARATE DIHYDRATE 80; 4.5 UG/1; UG/1
1-2 AEROSOL RESPIRATORY (INHALATION) EVERY 4 HOURS PRN
Qty: 10.2 G | Refills: 11 | Status: SHIPPED | OUTPATIENT
Start: 2024-02-15 | End: 2024-09-30 | Stop reason: ALTCHOICE

## 2024-02-15 SDOH — HEALTH STABILITY: PHYSICAL HEALTH: ON AVERAGE, HOW MANY MINUTES DO YOU ENGAGE IN EXERCISE AT THIS LEVEL?: 10 MIN

## 2024-02-15 SDOH — HEALTH STABILITY: PHYSICAL HEALTH: ON AVERAGE, HOW MANY DAYS PER WEEK DO YOU ENGAGE IN MODERATE TO STRENUOUS EXERCISE (LIKE A BRISK WALK)?: 1 DAY

## 2024-02-15 ASSESSMENT — ASTHMA QUESTIONNAIRES
QUESTION_3 LAST FOUR WEEKS HOW OFTEN DID YOUR ASTHMA SYMPTOMS (WHEEZING, COUGHING, SHORTNESS OF BREATH, CHEST TIGHTNESS OR PAIN) WAKE YOU UP AT NIGHT OR EARLIER THAN USUAL IN THE MORNING: NOT AT ALL
QUESTION_5 LAST FOUR WEEKS HOW WOULD YOU RATE YOUR ASTHMA CONTROL: POORLY CONTROLLED
QUESTION_2 LAST FOUR WEEKS HOW OFTEN HAVE YOU HAD SHORTNESS OF BREATH: THREE TO SIX TIMES A WEEK
ACT_TOTALSCORE: 15
ACT_TOTALSCORE: 15
QUESTION_4 LAST FOUR WEEKS HOW OFTEN HAVE YOU USED YOUR RESCUE INHALER OR NEBULIZER MEDICATION (SUCH AS ALBUTEROL): ONE OR TWO TIMES PER DAY
QUESTION_1 LAST FOUR WEEKS HOW MUCH OF THE TIME DID YOUR ASTHMA KEEP YOU FROM GETTING AS MUCH DONE AT WORK, SCHOOL OR AT HOME: SOME OF THE TIME

## 2024-02-15 ASSESSMENT — SOCIAL DETERMINANTS OF HEALTH (SDOH): HOW OFTEN DO YOU GET TOGETHER WITH FRIENDS OR RELATIVES?: TWICE A WEEK

## 2024-02-15 NOTE — PATIENT INSTRUCTIONS
Preventive Care Advice   This is general advice given by our system to help you stay healthy. However, your care team may have specific advice just for you. Please talk to your care team about your preventive care needs.  Nutrition  Eat 5 or more servings of fruits and vegetables each day.  Try wheat bread, brown rice and whole grain pasta (instead of white bread, rice, and pasta).  Get enough calcium and vitamin D. Check the label on foods and aim for 100% of the RDA (recommended daily allowance).  Lifestyle  Exercise at least 150 minutes each week  (30 minutes a day, 5 days a week).  Do muscle strengthening activities 2 days a week. These help control your weight and prevent disease.  No smoking.  Wear sunscreen to prevent skin cancer.  Have a dental exam and cleaning every 6 months.  Yearly exams  See your health care team every year to talk about:  Any changes in your health.  Any medicines your care team has prescribed.  Preventive care, family planning, and ways to prevent chronic diseases.  Shots (vaccines)   HPV shots (up to age 26), if you've never had them before.  Hepatitis B shots (up to age 59), if you've never had them before.  COVID-19 shot: Get this shot when it's due.  Flu shot: Get a flu shot every year.  Tetanus shot: Get a tetanus shot every 10 years.  Pneumococcal, hepatitis A, and RSV shots: Ask your care team if you need these based on your risk.  Shingles shot (for age 50 and up)  General health tests  Diabetes screening:  Starting at age 35, Get screened for diabetes at least every 3 years.  If you are younger than age 35, ask your care team if you should be screened for diabetes.  Cholesterol test: At age 39, start having a cholesterol test every 5 years, or more often if advised.  Bone density scan (DEXA): At age 50, ask your care team if you should have this scan for osteoporosis (brittle bones).  Hepatitis C: Get tested at least once in your life.  STIs (sexually transmitted  infections)  Before age 24: Ask your care team if you should be screened for STIs.  After age 24: Get screened for STIs if you're at risk. You are at risk for STIs (including HIV) if:  You are sexually active with more than one person.  You don't use condoms every time.  You or a partner was diagnosed with a sexually transmitted infection.  If you are at risk for HIV, ask about PrEP medicine to prevent HIV.  Get tested for HIV at least once in your life, whether you are at risk for HIV or not.  Cancer screening tests  Cervical cancer screening: If you have a cervix, begin getting regular cervical cancer screening tests starting at age 21.  Breast cancer scan (mammogram): If you've ever had breasts, begin having regular mammograms starting at age 40. This is a scan to check for breast cancer.  Colon cancer screening: It is important to start screening for colon cancer at age 45.  Have a colonoscopy test every 10 years (or more often if you're at risk) Or, ask your provider about stool tests like a FIT test every year or Cologuard test every 3 years.  To learn more about your testing options, visit:   https://[email or web address].me/042363.pdf.  For help making a decision, visit:   https://bit.ly/mc21414.  Prostate cancer screening test: If you have a prostate, ask your care team if a prostate cancer screening test (PSA) at age 55 is right for you.  Lung cancer screening: If you are a current or former smoker ages 50 to 80, ask your care team if ongoing lung cancer screenings are right for you.  For informational purposes only. Not to replace the advice of your health care provider. Copyright   2023 [employer]. All rights reserved. Clinically reviewed by the [facility] Transitions Program. [employer] 289529 - REV 01/24.    Preventing Falls: Care Instructions  Injuries and health problems such as trouble walking or poor eyesight can increase your risk of falling. So can some medicines. But there are things  "you can do to help prevent falls. You can exercise to get stronger. You can also arrange your home to make it safer.    Talk to your doctor about the medicines you take. Ask if any of them increase the risk of falls and whether they can be changed or stopped.   Try to exercise regularly. It can help improve your strength and balance. This can help lower your risk of falling.     Practice fall safety and prevention.    Wear low-heeled shoes that fit well and give your feet good support. Talk to your doctor if you have foot problems that make this hard.  Carry a cellphone or wear a medical alert device that you can use to call for help.  Use stepladders instead of chairs to reach high objects. Don't climb if you're at risk for falls. Ask for help, if needed.  Wear the correct eyeglasses, if you need them.    Make your home safer.    Remove rugs, cords, clutter, and furniture from walkways.  Keep your house well lit. Use night-lights in hallways and bathrooms.  Install and use sturdy handrails on stairways.  Wear nonskid footwear, even inside. Don't walk barefoot or in socks without shoes.    Be safe outside.    Use handrails, curb cuts, and ramps whenever possible.  Keep your hands free by using a shoulder bag or backpack.  Try to walk in well-lit areas. Watch out for uneven ground, changes in pavement, and debris.  Be careful in the winter. Walk on the grass or gravel when sidewalks are slippery. Use de-icer on steps and walkways. Add non-slip devices to shoes.    Put grab bars and nonskid mats in your shower or tub and near the toilet. Try to use a shower chair or bath bench when bathing.   Get into a tub or shower by putting in your weaker leg first. Get out with your strong side first. Have a phone or medical alert device in the bathroom with you.   Where can you learn more?  Go to https://www.CosmosID.net/patiented  Enter G117 in the search box to learn more about \"Preventing Falls: Care Instructions.\"  Current " as of: July 18, 2023               Content Version: 13.8    0927-6127 [employer].   Care instructions adapted under license by your healthcare professional. If you have questions about a medical condition or this instruction, always ask your healthcare professional. [employer] disclaims any warranty or liability for your use of this information.      Learning About Stress  What is stress?     Stress is your body's response to a hard situation. Your body can have a physical, emotional, or mental response. Stress is a fact of life for most people, and it affects everyone differently. What causes stress for you may not be stressful for someone else.  A lot of things can cause stress. You may feel stress when you go on a job interview, take a test, or run a race. This kind of short-term stress is normal and even useful. It can help you if you need to work hard or react quickly. For example, stress can help you finish an important job on time.  Long-term stress is caused by ongoing stressful situations or events. Examples of long-term stress include long-term health problems, ongoing problems at work, or conflicts in your family. Long-term stress can harm your health.  How does stress affect your health?  When you are stressed, your body responds as though you are in danger. It makes hormones that speed up your heart, make you breathe faster, and give you a burst of energy. This is called the fight-or-flight stress response. If the stress is over quickly, your body goes back to normal and no harm is done.  But if stress happens too often or lasts too long, it can have bad effects. Long-term stress can make you more likely to get sick, and it can make symptoms of some diseases worse. If you tense up when you are stressed, you may develop neck, shoulder, or low back pain. Stress is linked to high blood pressure and heart disease.  Stress also harms your emotional health. It can make you singer,  tense, or depressed. Your relationships may suffer, and you may not do well at work or school.  What can you do to manage stress?  You can try these things to help manage stress:   Do something active. Exercise or activity can help reduce stress. Walking is a great way to get started. Even everyday activities such as housecleaning or yard work can help.  Try yoga or shabana chi. These techniques combine exercise and meditation. You may need some training at first to learn them.  Do something you enjoy. For example, listen to music or go to a movie. Practice your hobby or do volunteer work.  Meditate. This can help you relax, because you are not worrying about what happened before or what may happen in the future.  Do guided imagery. Imagine yourself in any setting that helps you feel calm. You can use online videos, books, or a teacher to guide you.  Do breathing exercises. For example:  From a standing position, bend forward from the waist with your knees slightly bent. Let your arms dangle close to the floor.  Breathe in slowly and deeply as you return to a standing position. Roll up slowly and lift your head last.  Hold your breath for just a few seconds in the standing position.  Breathe out slowly and bend forward from the waist.  Let your feelings out. Talk, laugh, cry, and express anger when you need to. Talking with supportive friends or family, a counselor, or a bradley leader about your feelings is a healthy way to relieve stress. Avoid discussing your feelings with people who make you feel worse.  Write. It may help to write about things that are bothering you. This helps you find out how much stress you feel and what is causing it. When you know this, you can find better ways to cope.  What can you do to prevent stress?  You might try some of these things to help prevent stress:  Manage your time. This helps you find time to do the things you want and need to do.  Get enough sleep. Your body recovers from the  "stresses of the day while you are sleeping.  Get support. Your family, friends, and community can make a difference in how you experience stress.  Limit your news feed. Avoid or limit time on social media or news that may make you feel stressed.  Do something active. Exercise or activity can help reduce stress. Walking is a great way to get started.  Where can you learn more?  Go to https://www.Parature.net/patiented  Enter N032 in the search box to learn more about \"Learning About Stress.\"  Current as of: February 26, 2023               Content Version: 13.8    8710-9122 [employer].   Care instructions adapted under license by your healthcare professional. If you have questions about a medical condition or this instruction, always ask your healthcare professional. [employer] disclaims any warranty or liability for your use of this information.      Substance Use Disorder: Care Instructions  Overview     You can improve your life and health by stopping your use of alcohol or drugs. When you don't drink or use drugs, you may feel and sleep better. You may get along better with your family, friends, and coworkers. There are medicines and programs that can help with substance use disorder.  How can you care for yourself at home?  Here are some ways to help you stay sober and prevent relapse.  If you have been given medicine to help keep you sober or reduce your cravings, be sure to take it exactly as prescribed.  Talk to your doctor about programs that can help you stop using drugs or drinking alcohol.  Do not keep alcohol or drugs in your home.  Plan ahead. Think about what you'll say if other people ask you to drink or use drugs. Try not to spend time with people who drink or use drugs.  Use the time and money spent on drinking or drugs to do something that's important to you.  Preventing a relapse  Have a plan to deal with relapse. Learn to recognize changes in your thinking that lead " you to drink or use drugs. Get help before you start to drink or use drugs again.  Try to stay away from situations, friends, or places that may lead you to drink or use drugs.  If you feel the need to drink alcohol or use drugs again, seek help right away. Call a trusted friend or family member. Some people get support from organizations such as Narcotics Anonymous or [employer] or from treatment facilities.  If you relapse, get help as soon as you can. Some people make a plan with another person that outlines what they want that person to do for them if they relapse. The plan usually includes how to handle the relapse and who to notify in case of relapse.  Don't give up. Remember that a relapse doesn't mean that you have failed. Use the experience to learn the triggers that lead you to drink or use drugs. Then quit again. Recovery is a lifelong process. Many people have several relapses before they are able to quit for good.  Follow-up care is a key part of your treatment and safety. Be sure to make and go to all appointments, and call your doctor if you are having problems. It's also a good idea to know your test results and keep a list of the medicines you take.  When should you call for help?   Call 911  anytime you think you may need emergency care. For example, call if you or someone else:    Has overdosed or has withdrawal signs. Be sure to tell the emergency workers that you are or someone else is using or trying to quit using drugs. Overdose or withdrawal signs may include:  Losing consciousness.  Seizure.  Seeing or hearing things that aren't there (hallucinations).     Is thinking or talking about suicide or harming others.   Where to get help 24 hours a day, 7 days a week   If you or someone you know talks about suicide, self-harm, a mental health crisis, a substance use crisis, or any other kind of emotional distress, get help right away. You can:    Call the Suicide and Crisis Lifeline at 588.      "Call 1-407-040-TALK (1-928.986.9126).     Text HOME to 582524 to access the Crisis Text Line.   Consider saving these numbers in your phone.  Go to [employer] for more information or to chat online.  Call your doctor now or seek immediate medical care if:    You are having withdrawal symptoms. These may include nausea or vomiting, sweating, shakiness, and anxiety.   Watch closely for changes in your health, and be sure to contact your doctor if:    You have a relapse.     You need more help or support to stop.   Where can you learn more?  Go to https://www.Wis.dm.net/patiented  Enter H573 in the search box to learn more about \"Substance Use Disorder: Care Instructions.\"  Current as of: March 21, 2023               Content Version: 13.8    1477-5039 [employer].   Care instructions adapted under license by your healthcare professional. If you have questions about a medical condition or this instruction, always ask your healthcare professional. [employer] disclaims any warranty or liability for your use of this information.      "

## 2024-05-13 ENCOUNTER — VIRTUAL VISIT (OUTPATIENT)
Dept: URGENT CARE | Facility: CLINIC | Age: 54
End: 2024-05-13
Payer: COMMERCIAL

## 2024-05-13 DIAGNOSIS — J45.41 MODERATE PERSISTENT ASTHMA WITH ACUTE EXACERBATION: Primary | ICD-10-CM

## 2024-05-13 PROCEDURE — 99214 OFFICE O/P EST MOD 30 MIN: CPT | Mod: 95

## 2024-05-13 RX ORDER — PREDNISONE 20 MG/1
40 TABLET ORAL DAILY
Qty: 10 TABLET | Refills: 0 | Status: SHIPPED | OUTPATIENT
Start: 2024-05-13 | End: 2024-05-18

## 2024-05-13 NOTE — PROGRESS NOTES
Subjective   HPI    4 days ago started with a chest cold  Cough and chest congestion  Air quality was bad this weekend and coughing and runny nose  Covid test negative   Cough productive   Patient is known asthmatic   Recently switched to symbicort  No fevers  No abdominal pain  No nausea vomiting or diarrhea   Has a way to check his oxygen   Patient Active Problem List   Diagnosis    Allergic rhinitis    Lumbago    CARDIOVASCULAR SCREENING; LDL GOAL LESS THAN 160    Elbow pain    Moderate persistent asthma    Ureteral stone    Chronic diarrhea    Meniere's disease, bilateral    History of COVID-19    Chronic left shoulder pain     Past Surgical History:   Procedure Laterality Date    COMBINED CYSTOSCOPY, RETROGRADES, URETEROSCOPY, INSERT STENT  3/5/2014    Procedure: COMBINED CYSTOSCOPY, RETROGRADES, URETEROSCOPY, INSERT STENT;  CYSTOSCOPY, FLEXIBLE URETEROSCOPY, LEFT STENT PLACEMENT, LEFT RETROGRADES, STONE EXTRACTION;  Surgeon: Kenny Marroquin MD;  Location:  OR    NO HISTORY OF SURGERY         Social History     Tobacco Use    Smoking status: Former     Current packs/day: 0.00     Average packs/day: 1 pack/day for 6.0 years (6.0 ttl pk-yrs)     Types: Cigarettes     Start date: 1989     Quit date: 1995     Years since quittin.3    Smokeless tobacco: Never   Substance Use Topics    Alcohol use: Yes     Alcohol/week: 0.8 - 1.7 standard drinks of alcohol     Types: 1 - 2 Standard drinks or equivalent per week     Comment: once weekly     Family History   Problem Relation Age of Onset    Hypertension Mother     Breast Cancer Mother     Coronary Artery Disease Father     Prostate Cancer Father 75    Breast Cancer Sister     Valvular heart disease Brother     C.A.D. Paternal Grandfather          MI    Diabetes Other         cousin and grandfather           Reviewed and updated as needed this visit by Provider    Allergies  Meds               Review of Systems   Constitutional, HEENT,  cardiovascular, pulmonary, gi and gu systems are negative, except as otherwise noted.       Objective   Reported vitals:  There were no vitals taken for this visit.   healthy, alert, and no distress  PSYCH: Alert and oriented times 3; coherent speech, normal   rate and volume, able to articulate logical thoughts, able   to abstract reason, no tangential thoughts, no hallucinations   or delusions  His affect is normal  RESP: No cough, no audible wheezing, able to talk in full sentences  Additional exam:  none  Remainder of exam unable to be completed due to telephone visits    Diagnostic Test Results:  Labs reviewed in Epic        Assessment/Plan:      ICD-10-CM    1. Moderate persistent asthma with acute exacerbation  J45.41 predniSONE (DELTASONE) 20 MG tablet        Prescribed with prednisone short burst  Patient will check his oxygen levels and monitor at home but if worsening he will come in person  If not improving in 1-2 days recommend in person evaluation   If with any symptoms of chest pain or shortness of breath, lightheadedness, palpitations, feeling like passing out or change and worsening in the quality of your symptoms, please proceed to ER. Recommend follow up with PCP in a few days for re-evaluation.       Joined the call at 5/13/2024, 2:01:16 pm.  Left the call at 5/13/2024, 2:07:14 pm.  You were on the call for 5 minutes 57 seconds .  Video done via:  [employer]  Originating site: patient home   Provider location: provider home     Shaneka Watson MD

## 2024-08-15 ENCOUNTER — NURSE TRIAGE (OUTPATIENT)
Dept: INTERNAL MEDICINE | Facility: CLINIC | Age: 54
End: 2024-08-15
Payer: COMMERCIAL

## 2024-08-15 ENCOUNTER — MYC MEDICAL ADVICE (OUTPATIENT)
Dept: INTERNAL MEDICINE | Facility: CLINIC | Age: 54
End: 2024-08-15
Payer: COMMERCIAL

## 2024-08-15 DIAGNOSIS — J45.20 MILD INTERMITTENT ASTHMA WITHOUT COMPLICATION: Primary | ICD-10-CM

## 2024-08-15 RX ORDER — LEVALBUTEROL TARTRATE 45 UG/1
2 AEROSOL, METERED ORAL EVERY 4 HOURS PRN
Qty: 15 G | Refills: 0 | Status: SHIPPED | OUTPATIENT
Start: 2024-08-15 | End: 2024-09-30

## 2024-08-15 NOTE — TELEPHONE ENCOUNTER
"Switched to symbicort inhaler rather than Xopenex inhaler last February per PCP. Does not use daily and doesn't need to per patient per PCP direction. Felt breathing worsened during cruise in February being around smoke and feels that his breathing hasn't been \"great\" since then. Doesn't feel that he can tolerate strenuous activity with symbicort.     Per 2-15-24 OV note: Mild intermittent asthma without complication  Switch rescue inhaler to symbicort from xopenex  - budesonide-formoterol (SYMBICORT) 80-4.5 MCG/ACT Inhaler; Inhale 1-2 puffs into the lungs every 4 hours as needed (wheezing, SOB and/or coughing) (Max 12 inh/day)    Patient would like to trial back to xopenex as he feels this is more effective. Wife wants pulmonology referral. Unsure if you want to refer to pulmonology or not. If referred since it will likely be some time to get in do you want to see him? If so, In person or virtually?                   "

## 2024-08-15 NOTE — TELEPHONE ENCOUNTER
I'd recommend a visit as many possibilities here.  Until then - can try xopenex instead - but would typically say the symbicort would be more effective.     Fyi- xopenex isn't formulary as well.     [facility] messsage sent to pt.

## 2024-08-27 ENCOUNTER — TELEPHONE (OUTPATIENT)
Dept: INTERNAL MEDICINE | Facility: CLINIC | Age: 54
End: 2024-08-27
Payer: COMMERCIAL

## 2024-08-27 NOTE — TELEPHONE ENCOUNTER
1st attempt- Left voicemail for the patient to call back and schedule the following:    Appointment type:  Testing only- No clinic visit  Provider: Dr William Green  Return date:  next avail  Additional appointment(s) needed:  None  Additonal Notes:  None  Specialty phone number: [phone]

## 2024-09-28 DIAGNOSIS — J45.20 MILD INTERMITTENT ASTHMA WITHOUT COMPLICATION: ICD-10-CM

## 2024-09-30 RX ORDER — LEVALBUTEROL TARTRATE 45 UG/1
2 AEROSOL, METERED ORAL EVERY 4 HOURS PRN
Qty: 15 G | Refills: 3 | Status: SHIPPED | OUTPATIENT
Start: 2024-09-30

## 2024-09-30 NOTE — TELEPHONE ENCOUNTER
Patient's spouse called the clinic ([facility] on file) and was wondering if the prescription has been sent in. Informed them that the prescription was sent in and they should be getting it ready. They stated understanding and had no further questions.    Karen WOLF RN  [facility] Triage Team

## 2024-12-27 ENCOUNTER — HOSPITAL ENCOUNTER (EMERGENCY)
Facility: CLINIC | Age: 54
Discharge: HOME | End: 2024-12-28
Attending: EMERGENCY MEDICINE | Admitting: EMERGENCY MEDICINE
Payer: COMMERCIAL

## 2024-12-27 DIAGNOSIS — R05.1 ACUTE COUGH: ICD-10-CM

## 2024-12-27 DIAGNOSIS — J06.9 UPPER RESPIRATORY TRACT INFECTION, UNSPECIFIED TYPE: ICD-10-CM

## 2024-12-27 DIAGNOSIS — J45.909 UNCOMPLICATED ASTHMA, UNSPECIFIED ASTHMA SEVERITY, UNSPECIFIED WHETHER PERSISTENT: ICD-10-CM

## 2024-12-27 DIAGNOSIS — J10.1 INFLUENZA A: ICD-10-CM

## 2024-12-27 LAB
FLUAV RNA SPEC QL NAA+PROBE: NEGATIVE
FLUBV RNA RESP QL NAA+PROBE: NEGATIVE
RSV RNA SPEC NAA+PROBE: NEGATIVE
SARS-COV-2 RNA RESP QL NAA+PROBE: NEGATIVE

## 2024-12-27 PROCEDURE — 87637 SARSCOV2&INF A&B&RSV AMP PRB: CPT | Performed by: EMERGENCY MEDICINE

## 2024-12-27 PROCEDURE — 99283 EMERGENCY DEPT VISIT LOW MDM: CPT

## 2024-12-27 RX ORDER — PREDNISONE 20 MG/1
TABLET ORAL
Qty: 10 TABLET | Refills: 0 | Status: SHIPPED | OUTPATIENT
Start: 2024-12-27

## 2024-12-27 ASSESSMENT — ACTIVITIES OF DAILY LIVING (ADL)
ADLS_ACUITY_SCORE: 41
ADLS_ACUITY_SCORE: 41

## 2024-12-27 ASSESSMENT — COLUMBIA-SUICIDE SEVERITY RATING SCALE - C-SSRS
6. HAVE YOU EVER DONE ANYTHING, STARTED TO DO ANYTHING, OR PREPARED TO DO ANYTHING TO END YOUR LIFE?: NO
1. IN THE PAST MONTH, HAVE YOU WISHED YOU WERE DEAD OR WISHED YOU COULD GO TO SLEEP AND NOT WAKE UP?: NO
2. HAVE YOU ACTUALLY HAD ANY THOUGHTS OF KILLING YOURSELF IN THE PAST MONTH?: NO

## 2024-12-28 VITALS
OXYGEN SATURATION: 97 % | DIASTOLIC BLOOD PRESSURE: 89 MMHG | SYSTOLIC BLOOD PRESSURE: 112 MMHG | RESPIRATION RATE: 17 BRPM | HEIGHT: 71 IN | WEIGHT: 213 LBS | TEMPERATURE: 97.2 F | BODY MASS INDEX: 29.82 KG/M2 | HEART RATE: 100 BPM

## 2024-12-28 RX ORDER — OSELTAMIVIR PHOSPHATE 75 MG/1
75 CAPSULE ORAL 2 TIMES DAILY
Qty: 10 CAPSULE | Refills: 0 | Status: SHIPPED | OUTPATIENT
Start: 2024-12-28 | End: 2025-01-02

## 2024-12-28 NOTE — ED TRIAGE NOTES
Patient arrives with cough, congestion, fatigue, shortness of breath and aches that started this morning. Wife is also feeling unwell.      Triage Assessment (Adult)       Row Name 12/27/24 [id number]          Triage Assessment    Airway WDL WDL        Respiratory WDL    Respiratory WDL X;cough     Cough Frequency frequent        Skin Circulation/Temperature WDL    Skin Circulation/Temperature WDL WDL        Cardiac WDL    Cardiac WDL WDL        Peripheral/Neurovascular WDL    Peripheral Neurovascular WDL WDL        Cognitive/Neuro/Behavioral WDL    Cognitive/Neuro/Behavioral WDL WDL                      Patient seen, he requested urology to see. Left thigh abscess, does not appear to involve scrotum or phallus. s/p I&D with surgery, per primary team planned for IR drainage monday.    Dr. Esteban aware of patient, sent him in from office, has examined and no  intervention

## 2024-12-28 NOTE — DISCHARGE INSTRUCTIONS
Discharge Instructions  Upper Respiratory Infection    The upper respiratory tract includes the sinuses, nasal passages, pharynx, and larynx. A URI, or upper respiratory infection, is an infection of any of the parts of the upper airway. Symptoms include runny nose, congestion, sneezing, sore throat, cough, and fever. URIs are almost always caused by a virus. Antibiotics do not help with viral infections, so are generally not prescribed. A URI is very contagious through coughing and nasal secretions; make sure you wash your hands often and clean surfaces after sneezing, coughing or touching them. While you should start to improve in 3 - 5 days, remember that sometimes a cough can linger for several weeks.    Generally, every Emergency Department visit should have a follow-up clinic visit with either a primary or a specialty clinic/provider. Please follow-up as instructed by your emergency provider today.    Return to the Emergency Department if:  Any of your symptoms get much worse.  You seem very sick, like being too weak to get up.  You have chest pain or shortness of breath.   You have a severe headache.  You are vomiting (throwing up) so much you cannot keep fluids or medicines down.  You have confusion or seem unusually drowsy.  You have a seizure.    What can I do to help myself?  Fill any prescriptions the provider gave you and take them right away  If you have a fever, get plenty of rest and drink lots of fluids, especially water.  Using a humidifier or saline nose spray will also help loosen mucous.   Clothes or blankets will not change your fever. Do what is comfortable for you.  Bathing or sponging in lukewarm water may help you feel better.  Acetaminophen (Tylenol ) or ibuprofen (Advil , Motrin ) will help bring fever down and may help you feel more comfortable. Be sure to read and follow the package directions, and ask your provider if you have questions.  Do not drink alcohol.  Decongestants may help  you feel better. You may use decongestant nose sprays Afrin  (oxymetazoline) or Miguel-Synephrine  (phenylephrine hydrochloride) for up to 3 days, or may use a decongestant tablet like Sudafed  (pseudoephedrine).  If you were given a prescription for medicine here today, be sure to read all of the information (including the package insert) that comes with your prescription.  This will include important information about the medicine, its side effects, and any warnings that you need to know about.  The pharmacist who fills the prescription can provide more information and answer questions you may have about the medicine.  If you have questions or concerns that the pharmacist cannot address, please call or return to the Emergency Department.   Remember that you can always come back to the Emergency Department if you are not able to see your regular provider in the amount of time listed above, if you get any new symptoms, or if there is anything that worries you.     Discharge Instructions  Asthma    Asthma is a condition causing narrowing and inflammation of the airways that can make it hard to breathe.  Asthma can also cause cough, wheezing, noisy breathing and tightness in the chest.  Asthma can be brought on or  triggered  by many things, including dust, mold, pollen, cigarette smoke, exercise, stress and infections (like the common cold).     Generally, every Emergency Department visit should have a follow-up clinic visit with either a primary or a specialty clinic/provider. Please follow-up as instructed by your emergency provider today.    Return to the Emergency Department if:  Your breathing gets worse.  You need to use your inhaler more often than every 4 hours, or cannot get relief from your inhaler.  You are very weak, or feel much more ill.  You develop new symptoms, such as chest pain.  You cough up blood.  You are vomiting (throwing up) enough that you cannot keep fluids or your medicine down.    What can I  do to help myself?  Fill any prescriptions the provider gave you and take them right away--especially antibiotics. Be sure to finish the whole antibiotic prescription.  You may be given a prescription for an inhaler, which can help loosen tight air passages.  Use this as needed, but not more often than directed. Inhalers work much better when used with a spacer.   You may be given a prescription for a steroid to reduce inflammation. Used long-term, these can have some side effects, but for short-term use they are safe. You may notice restlessness or increased appetite (eating more).      You may use non-prescription cough or cold medicines. Cough medicines may help, but do not make the cough go away completely.   Avoid smoke, because this can make you feel worse. If you smoke, this may be a good time to quit! Consider using nicotine lozenges, gum, or patches to reduce cravings.   If you have a fever, Tylenol  (acetaminophen), Motrin  (ibuprofen), or Advil  (ibuprofen), may help bring fever down and may help you feel more comfortable. Be sure to read and follow the package directions, and ask your provider if you have questions.  Be sure to get your flu shot each year.  For certain age groups, the pneumonia shot can help prevent pneumonia.  It is important that you follow up with your regular provider, to be sure that you are improving from this spell (an acute asthma exacerbation), and also to do what you can to keep from having trouble again. Sometimes you need long-term medicines to keep your asthma under control.   If you were given a prescription for medicine here today, be sure to read all of the information (including the package insert) that comes with your prescription.  This will include important information about the medicine, its side effects, and any warnings that you need to know about.  The pharmacist who fills the prescription can provide more information and answer questions you may have about the  medicine.  If you have questions or concerns that the pharmacist cannot address, please call or return to the Emergency Department.   Remember that you can always come back to the Emergency Department if you are not able to see your regular provider in the amount of time listed above, if you get any new symptoms, or if there is anything that worries you.

## 2024-12-28 NOTE — ED PROVIDER NOTES
"  Emergency Department Note      History of Present Illness     Chief Complaint   Cough      HPI   Enrico Pedroza is a 54 year old male with history of asthma who presents with a cough. The patient reports his wife has been sick for 2 days with worsening cough, wheezing and fever of 102.5. She has also checked into the ED. The patient reports feeling some symptoms this morning that have worsened through out the day. Reports breathing is getting worse and his cough is mildly productive. Reports needing steroids in the past for respiratory infections. He has been using his Xopenex inhaler.Reports his heart rate is always . The patient denies fever, chest pain, leg swelling and hemoptysis. He reports he used to take omeprazole frequently but now only takes it as needed.     Independent Historian   None      Past Medical History     Medical History and Problem List   Allergic rhinitis  Lumbago  Moderate persistent asthma  Ureteral stone  Chronic diarrhea  Meniere's disease, bilateral  Chronic left shoulder pain.    Medications   Norco  Roxicodone  Xopenex   Antivert  Prilosec    Surgical History   Combined cystoscopy, retrogrades, ureteroscopy, insert stent    Physical Exam     Patient Vitals for the past 24 hrs:   BP Temp Temp src Pulse Resp SpO2 Height Weight   12/27/24 2140 118/80 97.2  F (36.2  C) Tympanic 96 17 96 % 1.803 m (5' 11\") 96.6 kg (213 lb)     Physical Exam  Eyes:  The pupils are equal and round    Conjunctivae and sclerae are normal  ENT:    The nose is normal    Pinnae are normal  CV:  Regular rate and rhythm     No edema  Resp:  Lungs are clear    Non-labored    No rales    No wheezing   MS:  Normal muscular tone    No asymmetric leg swelling  Skin:  No rash or acute skin lesions noted  Neuro:   Awake, alert.      Speech is normal and fluent.    Face is symmetric.     Moves all extremities    Diagnostics     Lab Results   Labs Ordered and Resulted from Time of ED Arrival to Time of ED " Departure   INFLUENZA A/B, RSV AND SARS-COV2 PCR - Normal       Result Value    Influenza A PCR Negative      Influenza B PCR Negative      RSV PCR Negative      SARS CoV2 PCR Negative         Imaging   No orders to display       Independent Interpretation   None    ED Course      Medications Administered   Medications - No data to display    Procedures   Procedures     Discussion of Management   None    ED Course   ED Course as of 12/28/24 0005   Fri Dec 27, 2024   2350 I obtained history and examined the patient as noted above         Additional Documentation  None    Medical Decision Making / Diagnosis     CMS Diagnoses: None    MIPS       None    MDM   Enrico Pedroza is a 54 year old male who presents to the emergency department with a cough that started today.  He has  not been having fevers but his wife has had fever of up to 102.5.  She has been ill for longer period of time with an upper respiratory infection, vomiting and diarrhea.  Patient notes a history of asthma and is concerned that his asthma will be exacerbated by this illness.  Currently his lungs are clear.  His oxygenation is normal.  His temperature and pulse rate are in the normal range.  Overall he appears well.  COVID and flu and RSV testing are all negative.  I did provide him with a prescription for prednisone in case his asthma begins to flare.  He does have Xopenex available to him to help with any wheezing.  He was discharged home.  Encouraged return with any new or worrisome symptoms.    Disposition   The patient was discharged.     Diagnosis     ICD-10-CM    1. Upper respiratory tract infection, unspecified type  J06.9       2. Acute cough  R05.1       3. Uncomplicated asthma, unspecified asthma severity, unspecified whether persistent  J45.909            Discharge Medications   New Prescriptions    PREDNISONE (DELTASONE) 20 MG TABLET    Take two tablets (= 40mg) each day for 5 (five) days         Scribe Disclosure:  Pérez AMADO  Juan, am serving as a scribe at 11:58 PM on 12/27/2024 to document services personally performed by Carlitos Fernandez MD based on my observations and the provider's statements to me.        Carlitos Fernandez MD  12/28/24 0013

## 2024-12-30 ENCOUNTER — PATIENT OUTREACH (OUTPATIENT)
Dept: INTERNAL MEDICINE | Facility: CLINIC | Age: 54
End: 2024-12-30
Payer: COMMERCIAL

## 2025-01-09 ENCOUNTER — TELEPHONE (OUTPATIENT)
Dept: NURSING | Facility: CLINIC | Age: 55
End: 2025-01-09
Payer: COMMERCIAL

## 2025-01-09 ENCOUNTER — NURSE TRIAGE (OUTPATIENT)
Dept: NURSING | Facility: CLINIC | Age: 55
End: 2025-01-09
Payer: COMMERCIAL

## 2025-01-09 DIAGNOSIS — J45.20 MILD INTERMITTENT ASTHMA WITHOUT COMPLICATION: ICD-10-CM

## 2025-01-09 RX ORDER — LEVALBUTEROL TARTRATE 45 UG/1
2 AEROSOL, METERED ORAL EVERY 4 HOURS PRN
Qty: 15 G | Refills: 0 | Status: SHIPPED | OUTPATIENT
Start: 2025-01-09

## 2025-01-10 ENCOUNTER — TELEPHONE (OUTPATIENT)
Dept: INTERNAL MEDICINE | Facility: CLINIC | Age: 55
End: 2025-01-10
Payer: COMMERCIAL

## 2025-01-10 NOTE — TELEPHONE ENCOUNTER
Asthmatic and needs a refill on rescue inhaler. Waiting two days and now out.  He is recovering from Influenza A.      Paloma Altamirano RN  [facility]    Provider consult indicated.     Reason for page: refill of rescue inhaler    Page sent to Dr. Boston by RN at 8:39 p.m.           Provider, Dr. Boston, returning page to [facility] at 8:41 p.m.    Provider recommended plan of care: May refill for one inhaler and patient to see PCP for further refills and evaluation of his respiratory status.    Provider Recommendation Follow Up:   Reached patient/caregiver. Informed of provider's recommendations. Patient verbalized understanding and agrees with the plan. He already has an appointment set up with Dr Green and will contact him if he needs another inhaler since this refill is for one inhaler only.          Paloma Altamirano RN       Reason for Disposition   [1] Prescription refill request for ESSENTIAL medicine (i.e., likelihood of harm to patient if not taken) AND [2] triager unable to refill per department policy    Additional Information   Negative: New-onset or worsening symptoms, see that guideline (e.g., diarrhea, runny nose, sore throat)   Negative: Medicine question not related to refill or renewal   Negative: Caller (e.g., patient or pharmacist) requesting information about a new medicine   Negative: Caller requesting information unrelated to medicine    Protocols used: Medication Refill and Renewal Call-A-

## 2025-01-10 NOTE — TELEPHONE ENCOUNTER
Pt is phoning stating that he received a refill on his levalbuterol inhaler and that pharmacy is stating that they are unable to see order. Write can see order as follows and called pharmacy who will be filling order now. Pt states that he has been struggling with his insurance provider not wanting to pay - pt is willing to pay out of pocked for this medication. Pt will talk with provider to see if there is something that they can write - insurance wants pt to use albuterol and pt is unable to do so - states that he is allergic to it. Pt grateful to writer for calling pharmacy so that pt can get inhaler.       Disp Refills Start End YAN   levalbuterol (XOPENEX HFA) 45 MCG/ACT inhaler 15 g 0 1/9/2025 -- No   Sig - Route: Inhale 2 puffs into the lungs every 4 hours as needed for shortness of breath or wheezing. - Inhalation   Class: E-Prescribe   Order: [id number]   Prior authorization: Payer Waiting for Response   This order has not been released to its destination.       Pharmacy    [id number] - [place] - [address]     No triage       Radha Molina RN  [facility]  9:53 PM 1/9/2025

## 2025-01-10 NOTE — TELEPHONE ENCOUNTER
"Per pharmacy    \" levalbuterol (XOPENEX HFA) 45 MCG/ACT inhaler not covered by pt plan.  Please call/fax the pharmacy to change med.\"    Angela [phone]  "

## 2025-01-14 NOTE — TELEPHONE ENCOUNTER
Patient reports paying out of pocket for the current prescription and plans to discuss future options in detail at his upcoming appt scheduled 2/17/25.

## 2025-01-14 NOTE — TELEPHONE ENCOUNTER
Call pt- ask him if he wants to pay cash for this or use alternative - if alternative given his s/e with plain albuterol would try symbicort as that can be used as a rescue inhaler and doesn't have albuterol as ingredient

## 2025-02-08 DIAGNOSIS — J45.20 MILD INTERMITTENT ASTHMA WITHOUT COMPLICATION: ICD-10-CM

## 2025-02-10 RX ORDER — LEVALBUTEROL TARTRATE 45 UG/1
2 AEROSOL, METERED ORAL EVERY 4 HOURS PRN
Qty: 15 G | Refills: 0 | Status: SHIPPED | OUTPATIENT
Start: 2025-02-10

## 2025-02-12 SDOH — HEALTH STABILITY: PHYSICAL HEALTH: ON AVERAGE, HOW MANY MINUTES DO YOU ENGAGE IN EXERCISE AT THIS LEVEL?: PATIENT DECLINED

## 2025-02-12 SDOH — HEALTH STABILITY: PHYSICAL HEALTH
ON AVERAGE, HOW MANY DAYS PER WEEK DO YOU ENGAGE IN MODERATE TO STRENUOUS EXERCISE (LIKE A BRISK WALK)?: PATIENT DECLINED

## 2025-02-12 ASSESSMENT — SOCIAL DETERMINANTS OF HEALTH (SDOH): HOW OFTEN DO YOU GET TOGETHER WITH FRIENDS OR RELATIVES?: PATIENT DECLINED

## 2025-02-17 ENCOUNTER — OFFICE VISIT (OUTPATIENT)
Dept: INTERNAL MEDICINE | Facility: CLINIC | Age: 55
End: 2025-02-17
Attending: INTERNAL MEDICINE
Payer: COMMERCIAL

## 2025-02-17 VITALS
OXYGEN SATURATION: 99 % | HEIGHT: 71 IN | RESPIRATION RATE: 14 BRPM | WEIGHT: 219.4 LBS | TEMPERATURE: 97.9 F | DIASTOLIC BLOOD PRESSURE: 77 MMHG | BODY MASS INDEX: 30.72 KG/M2 | SYSTOLIC BLOOD PRESSURE: 120 MMHG | HEART RATE: 75 BPM

## 2025-02-17 DIAGNOSIS — Z12.5 SCREENING FOR PROSTATE CANCER: ICD-10-CM

## 2025-02-17 DIAGNOSIS — Z13.220 LIPID SCREENING: ICD-10-CM

## 2025-02-17 DIAGNOSIS — Z13.1 SCREENING FOR DIABETES MELLITUS: ICD-10-CM

## 2025-02-17 DIAGNOSIS — Z00.00 ROUTINE GENERAL MEDICAL EXAMINATION AT A HEALTH CARE FACILITY: Primary | ICD-10-CM

## 2025-02-17 DIAGNOSIS — J45.20 MILD INTERMITTENT ASTHMA WITHOUT COMPLICATION: ICD-10-CM

## 2025-02-17 PROBLEM — M25.512 CHRONIC LEFT SHOULDER PAIN: Status: RESOLVED | Noted: 2023-01-20 | Resolved: 2025-02-17

## 2025-02-17 PROBLEM — K52.9 CHRONIC DIARRHEA: Status: RESOLVED | Noted: 2017-04-03 | Resolved: 2025-02-17

## 2025-02-17 PROBLEM — G89.29 CHRONIC LEFT SHOULDER PAIN: Status: RESOLVED | Noted: 2023-01-20 | Resolved: 2025-02-17

## 2025-02-17 LAB
ANION GAP SERPL CALCULATED.3IONS-SCNC: 10 MMOL/L (ref 7–15)
BUN SERPL-MCNC: 13.1 MG/DL (ref 6–20)
CALCIUM SERPL-MCNC: 9.7 MG/DL (ref 8.8–10.4)
CHLORIDE SERPL-SCNC: 107 MMOL/L (ref 98–107)
CHOLEST SERPL-MCNC: 209 MG/DL
CREAT SERPL-MCNC: 1 MG/DL (ref 0.67–1.17)
EGFRCR SERPLBLD CKD-EPI 2021: 89 ML/MIN/1.73M2
FASTING STATUS PATIENT QL REPORTED: YES
FASTING STATUS PATIENT QL REPORTED: YES
GLUCOSE SERPL-MCNC: 83 MG/DL (ref 70–99)
HCO3 SERPL-SCNC: 24 MMOL/L (ref 22–29)
HDLC SERPL-MCNC: 49 MG/DL
LDLC SERPL CALC-MCNC: 125 MG/DL
NONHDLC SERPL-MCNC: 160 MG/DL
POTASSIUM SERPL-SCNC: 4.4 MMOL/L (ref 3.4–5.3)
PSA SERPL DL<=0.01 NG/ML-MCNC: 1.43 NG/ML (ref 0–3.5)
SODIUM SERPL-SCNC: 141 MMOL/L (ref 135–145)
TRIGL SERPL-MCNC: 175 MG/DL

## 2025-02-17 PROCEDURE — 90750 HZV VACC RECOMBINANT IM: CPT | Performed by: INTERNAL MEDICINE

## 2025-02-17 PROCEDURE — 90715 TDAP VACCINE 7 YRS/> IM: CPT | Performed by: INTERNAL MEDICINE

## 2025-02-17 PROCEDURE — 36415 COLL VENOUS BLD VENIPUNCTURE: CPT | Performed by: INTERNAL MEDICINE

## 2025-02-17 PROCEDURE — 80061 LIPID PANEL: CPT | Performed by: INTERNAL MEDICINE

## 2025-02-17 PROCEDURE — 99396 PREV VISIT EST AGE 40-64: CPT | Mod: 25 | Performed by: INTERNAL MEDICINE

## 2025-02-17 PROCEDURE — 80048 BASIC METABOLIC PNL TOTAL CA: CPT | Performed by: INTERNAL MEDICINE

## 2025-02-17 PROCEDURE — 90472 IMMUNIZATION ADMIN EACH ADD: CPT | Performed by: INTERNAL MEDICINE

## 2025-02-17 PROCEDURE — G0103 PSA SCREENING: HCPCS | Performed by: INTERNAL MEDICINE

## 2025-02-17 PROCEDURE — 90673 RIV3 VACCINE NO PRESERV IM: CPT | Performed by: INTERNAL MEDICINE

## 2025-02-17 PROCEDURE — 99213 OFFICE O/P EST LOW 20 MIN: CPT | Mod: 25 | Performed by: INTERNAL MEDICINE

## 2025-02-17 PROCEDURE — 90471 IMMUNIZATION ADMIN: CPT | Performed by: INTERNAL MEDICINE

## 2025-02-17 RX ORDER — LEVALBUTEROL TARTRATE 45 UG/1
2 AEROSOL, METERED ORAL EVERY 4 HOURS PRN
Qty: 15 G | Refills: 11 | Status: SHIPPED | OUTPATIENT
Start: 2025-02-17

## 2025-02-17 RX ORDER — LEVALBUTEROL TARTRATE 45 UG/1
2 AEROSOL, METERED ORAL EVERY 4 HOURS PRN
Qty: 15 G | Refills: 3 | Status: CANCELLED | OUTPATIENT
Start: 2025-02-17

## 2025-02-17 ASSESSMENT — ASTHMA QUESTIONNAIRES
ACT_TOTALSCORE: 22
ACT_TOTALSCORE: 22
QUESTION_5 LAST FOUR WEEKS HOW WOULD YOU RATE YOUR ASTHMA CONTROL: WELL CONTROLLED
QUESTION_1 LAST FOUR WEEKS HOW MUCH OF THE TIME DID YOUR ASTHMA KEEP YOU FROM GETTING AS MUCH DONE AT WORK, SCHOOL OR AT HOME: NONE OF THE TIME
QUESTION_3 LAST FOUR WEEKS HOW OFTEN DID YOUR ASTHMA SYMPTOMS (WHEEZING, COUGHING, SHORTNESS OF BREATH, CHEST TIGHTNESS OR PAIN) WAKE YOU UP AT NIGHT OR EARLIER THAN USUAL IN THE MORNING: NOT AT ALL
QUESTION_2 LAST FOUR WEEKS HOW OFTEN HAVE YOU HAD SHORTNESS OF BREATH: ONCE OR TWICE A WEEK
QUESTION_4 LAST FOUR WEEKS HOW OFTEN HAVE YOU USED YOUR RESCUE INHALER OR NEBULIZER MEDICATION (SUCH AS ALBUTEROL): ONCE A WEEK OR LESS

## 2025-02-17 NOTE — PROGRESS NOTES
"Preventive Care Visit  [facility]  William Green MD, Internal Medicine  Feb 17, 2025      Assessment & Plan     Routine general medical examination at a health care facility  Updated screening, immunizations, prevention.  Please see health maintenance list, care gaps     Mild intermittent asthma without complication  Prn use of ERWIN+ steroid .   - mometasone furoate (ASMANEX HFA) 100 MCG/ACT inhaler; Inhale 1 puff into the lungs every 4 hours as needed (wheezing, coughing (use whenever you take your xopenex)).  - levalbuterol (XOPENEX HFA) 45 MCG/ACT inhaler; Inhale 2 puffs into the lungs every 4 hours as needed for shortness of breath or wheezing.    Screening for prostate cancer  - PROSTATE SPEC ANTIGEN SCREEN; Future  - PROSTATE SPEC ANTIGEN SCREEN    Screening for diabetes mellitus  - Basic metabolic panel  (Ca, Cl, CO2, Creat, Gluc, K, Na, BUN); Future  - Basic metabolic panel  (Ca, Cl, CO2, Creat, Gluc, K, Na, BUN)    Lipid screening  - Lipid panel reflex to direct LDL Fasting; Future  - Lipid panel reflex to direct LDL Fasting    Patient has been advised of split billing requirements and indicates understanding: Yes        BMI  Estimated body mass index is 30.6 kg/m  as calculated from the following:    Height as of this encounter: 1.803 m (5' 11\").    Weight as of this encounter: 99.5 kg (219 lb 6.4 oz).       Counseling  Appropriate preventive services were addressed with this patient via screening, questionnaire, or discussion as appropriate for fall prevention, nutrition, physical activity, Tobacco-use cessation, social engagement, weight loss and cognition.  Checklist reviewing preventive services available has been given to the patient.  Reviewed patient's diet, addressing concerns and/or questions.       Work on weight loss  Regular exercise  See Patient Instructions    Lupillo Brock is a 54 year old, presenting for the following:  Physical          HPI      Asthma " Follow-Up    Was ACT completed today?  Yes        2/17/2025     8:12 AM   ACT Total Scores   ACT TOTAL SCORE (Goal Greater than or Equal to 20) 22    In the past 12 months, how many times did you visit the emergency room for your asthma without being admitted to the hospital? 0   In the past 12 months, how many times were you hospitalized overnight because of your asthma? 0       Patient-reported        How many days per week do you miss taking your asthma controller medication?  I do not have an asthma controller medication  Please describe any recent triggers for your asthma: smoke and strong odors and fumes  Have you had any Emergency Room Visits, Urgent Care Visits, or Hospital Admissions since your last office visit?  No    Health Care Directive  Patient does not have a Health Care Directive: Discussed advance care planning with patient; information given to patient to review.      2/12/2025   General Health   How would you rate your overall physical health? Good   Feel stress (tense, anxious, or unable to sleep) Patient declined         2/12/2025   Nutrition   Three or more servings of calcium each day? (!) NO   Diet: Regular (no restrictions)   How many servings of fruit and vegetables per day? (!) 0-1   How many sweetened beverages each day? (!) 2         2/12/2025   Exercise   Days per week of moderate/strenous exercise Patient declined   Average minutes spent exercising at this level Patient declined         2/12/2025   Social Factors   Frequency of gathering with friends or relatives Patient declined   Worry food won't last until get money to buy more Patient declined   Food not last or not have enough money for food? Patient declined   Do you have housing? (Housing is defined as stable permanent housing and does not include staying ouside in a car, in a tent, in an abandoned building, in an overnight shelter, or couch-surfing.) Patient declined   Are you worried about losing your housing? Patient  declined   Lack of transportation? Patient declined   Unable to get utilities (heat,electricity)? Patient declined         2025   Fall Risk   Fallen 2 or more times in the past year? No   Trouble with walking or balance? No          2025   Dental   Dentist two times every year? Yes         2/15/2024   TB Screening   Were you born outside of the [place]? No         Today's PHQ-2 Score:       2025     8:11 AM   PHQ-2 (  [employer])   Q1: Little interest or pleasure in doing things 0   Q2: Feeling down, depressed or hopeless 0   PHQ-2 Score 0    Q1: Little interest or pleasure in doing things Not at all   Q2: Feeling down, depressed or hopeless Not at all   PHQ-2 Score 0       Patient-reported           2025   Substance Use   Alcohol more than 3/day or more than 7/wk No   Do you use any other substances recreationally? (!) DECLINE     Social History     Tobacco Use    Smoking status: Former     Current packs/day: 0.00     Average packs/day: 1 pack/day for 6.0 years (6.0 ttl pk-yrs)     Types: Cigarettes     Start date: 1989     Quit date: 1995     Years since quittin.1    Smokeless tobacco: Never   Substance Use Topics    Alcohol use: Yes     Alcohol/week: 0.8 - 1.7 standard drinks of alcohol     Types: 1 - 2 Standard drinks or equivalent per week     Comment: once weekly    Drug use: No           2025   STI Screening   New sexual partner(s) since last STI/HIV test? (!) DECLINE   Last PSA:   PSA   Date Value Ref Range Status   2020 1.47 0 - 4 ug/L Final     Comment:     Assay Method:  Chemiluminescence using Siemens Vista analyzer     Prostate Specific Antigen Screen   Date Value Ref Range Status   2022 1.93 0.00 - 3.50 ng/mL Final     ASCVD Risk   The 10-year ASCVD risk score (Sal JARRELL, et al., 2019) is: 4%    Values used to calculate the score:      Age: 54 years      Sex: Male      Is Non- : No      Diabetic: No      Tobacco smoker: No     "  Systolic Blood Pressure: 120 mmHg      Is BP treated: No      HDL Cholesterol: 67 mg/dL      Total Cholesterol: 206 mg/dL           Reviewed and updated as needed this visit by Provider   Tobacco  Allergies  Meds  Problems  Med Hx  Surg Hx  Fam Hx                     Objective    Exam  /77   Pulse 75   Temp 97.9  F (36.6  C) (Temporal)   Resp 14   Ht 1.803 m (5' 11\")   Wt 99.5 kg (219 lb 6.4 oz)   SpO2 99%   BMI 30.60 kg/m     Estimated body mass index is 30.6 kg/m  as calculated from the following:    Height as of this encounter: 1.803 m (5' 11\").    Weight as of this encounter: 99.5 kg (219 lb 6.4 oz).    Physical Exam  GENERAL: alert and no distress  EYES: Eyes grossly normal to inspection, PERRL and conjunctivae and sclerae normal  HENT: ear canals and TM's normal, nose and mouth without ulcers or lesions  NECK: no adenopathy, no asymmetry, masses, or scars  RESP: lungs clear to auscultation - no rales, rhonchi or wheezes  CV: regular rate and rhythm, normal S1 S2, no S3 or S4, no murmur, click or rub, no peripheral edema  ABDOMEN: soft, nontender, no hepatosplenomegaly, no masses and bowel sounds normal  MS: no gross musculoskeletal defects noted, no edema  SKIN: no suspicious lesions or rashes  NEURO: Normal strength and tone, mentation intact and speech normal  PSYCH: mentation appears normal, affect normal/bright  LYMPH: no cervical adenopathy        Signed Electronically by: William Green MD    "

## 2025-02-17 NOTE — PATIENT INSTRUCTIONS
Patient Education   Preventive Care Advice   This is general advice given by our system to help you stay healthy. However, your care team may have specific advice just for you. Please talk to your care team about your preventive care needs.  Nutrition  Eat 5 or more servings of fruits and vegetables each day.  Try wheat bread, brown rice and whole grain pasta (instead of white bread, rice, and pasta).  Get enough calcium and vitamin D. Check the label on foods and aim for 100% of the RDA (recommended daily allowance).  Lifestyle  Exercise at least 150 minutes each week  (30 minutes a day, 5 days a week).  Do muscle strengthening activities 2 days a week. These help control your weight and prevent disease.  No smoking.  Wear sunscreen to prevent skin cancer.  Have a dental exam and cleaning every 6 months.  Yearly exams  See your health care team every year to talk about:  Any changes in your health.  Any medicines your care team has prescribed.  Preventive care, family planning, and ways to prevent chronic diseases.  Shots (vaccines)   HPV shots (up to age 26), if you've never had them before.  Hepatitis B shots (up to age 59), if you've never had them before.  COVID-19 shot: Get this shot when it's due.  Flu shot: Get a flu shot every year.  Tetanus shot: Get a tetanus shot every 10 years.  Pneumococcal, hepatitis A, and RSV shots: Ask your care team if you need these based on your risk.  Shingles shot (for age 50 and up)  General health tests  Diabetes screening:  Starting at age 35, Get screened for diabetes at least every 3 years.  If you are younger than age 35, ask your care team if you should be screened for diabetes.  Cholesterol test: At age 39, start having a cholesterol test every 5 years, or more often if advised.  Bone density scan (DEXA): At age 50, ask your care team if you should have this scan for osteoporosis (brittle bones).  Hepatitis C: Get tested at least once in your life.  STIs (sexually  transmitted infections)  Before age 24: Ask your care team if you should be screened for STIs.  After age 24: Get screened for STIs if you're at risk. You are at risk for STIs (including HIV) if:  You are sexually active with more than one person.  You don't use condoms every time.  You or a partner was diagnosed with a sexually transmitted infection.  If you are at risk for HIV, ask about PrEP medicine to prevent HIV.  Get tested for HIV at least once in your life, whether you are at risk for HIV or not.  Cancer screening tests  Cervical cancer screening: If you have a cervix, begin getting regular cervical cancer screening tests starting at age 21.  Breast cancer scan (mammogram): If you've ever had breasts, begin having regular mammograms starting at age 40. This is a scan to check for breast cancer.  Colon cancer screening: It is important to start screening for colon cancer at age 45.  Have a colonoscopy test every 10 years (or more often if you're at risk) Or, ask your provider about stool tests like a FIT test every year or Cologuard test every 3 years.  To learn more about your testing options, visit:   .  For help making a decision, visit:   https://bit.ly/yz27622.  Prostate cancer screening test: If you have a prostate, ask your care team if a prostate cancer screening test (PSA) at age 55 is right for you.  Lung cancer screening: If you are a current or former smoker ages 50 to 80, ask your care team if ongoing lung cancer screenings are right for you.  For informational purposes only. Not to replace the advice of your health care provider. Copyright   2023 [employer]. All rights reserved. Clinically reviewed by the [facility]. [employer] 516297 - REV 01/24.

## 2025-03-12 ENCOUNTER — OFFICE VISIT (OUTPATIENT)
Dept: URGENT CARE | Facility: URGENT CARE | Age: 55
End: 2025-03-12
Payer: COMMERCIAL

## 2025-03-12 VITALS
HEART RATE: 97 BPM | DIASTOLIC BLOOD PRESSURE: 81 MMHG | SYSTOLIC BLOOD PRESSURE: 124 MMHG | OXYGEN SATURATION: 95 % | RESPIRATION RATE: 18 BRPM | BODY MASS INDEX: 30.91 KG/M2 | WEIGHT: 221.6 LBS | TEMPERATURE: 97.8 F

## 2025-03-12 DIAGNOSIS — M54.6 ACUTE LEFT-SIDED THORACIC BACK PAIN: Primary | ICD-10-CM

## 2025-03-12 LAB
ALBUMIN UR-MCNC: NEGATIVE MG/DL
APPEARANCE UR: CLEAR
BILIRUB UR QL STRIP: NEGATIVE
COLOR UR AUTO: YELLOW
GLUCOSE UR STRIP-MCNC: NEGATIVE MG/DL
HGB UR QL STRIP: NEGATIVE
KETONES UR STRIP-MCNC: NEGATIVE MG/DL
LEUKOCYTE ESTERASE UR QL STRIP: NEGATIVE
NITRATE UR QL: NEGATIVE
PH UR STRIP: 6 [PH] (ref 5–7)
SP GR UR STRIP: 1.02 (ref 1–1.03)
UROBILINOGEN UR STRIP-ACNC: 0.2 E.U./DL

## 2025-03-12 PROCEDURE — 81003 URINALYSIS AUTO W/O SCOPE: CPT | Performed by: NURSE PRACTITIONER

## 2025-03-12 PROCEDURE — 99213 OFFICE O/P EST LOW 20 MIN: CPT | Mod: 25 | Performed by: NURSE PRACTITIONER

## 2025-03-12 PROCEDURE — 3079F DIAST BP 80-89 MM HG: CPT | Performed by: NURSE PRACTITIONER

## 2025-03-12 PROCEDURE — 96372 THER/PROPH/DIAG INJ SC/IM: CPT | Performed by: NURSE PRACTITIONER

## 2025-03-12 PROCEDURE — 3074F SYST BP LT 130 MM HG: CPT | Performed by: NURSE PRACTITIONER

## 2025-03-12 RX ORDER — KETOROLAC TROMETHAMINE 30 MG/ML
30 INJECTION, SOLUTION INTRAMUSCULAR; INTRAVENOUS ONCE
Status: COMPLETED | OUTPATIENT
Start: 2025-03-12 | End: 2025-03-12

## 2025-03-12 RX ORDER — CYCLOBENZAPRINE HCL 10 MG
10 TABLET ORAL 3 TIMES DAILY PRN
Qty: 30 TABLET | Refills: 0 | Status: SHIPPED | OUTPATIENT
Start: 2025-03-12 | End: 2025-03-22

## 2025-03-12 RX ORDER — NAPROXEN 500 MG/1
500 TABLET ORAL 2 TIMES DAILY WITH MEALS
Qty: 20 TABLET | Refills: 0 | Status: SHIPPED | OUTPATIENT
Start: 2025-03-12 | End: 2025-03-22

## 2025-03-12 RX ADMIN — KETOROLAC TROMETHAMINE 30 MG: 30 INJECTION, SOLUTION INTRAMUSCULAR; INTRAVENOUS at 18:32

## 2025-03-12 NOTE — PATIENT INSTRUCTIONS
No naproxen, Aleve, ibuprofen, Advil or other nonsteroidal anti-inflammatory medications for at least 8 hours after the shot you were given in clinic.    You may use Tylenol as desired per package instructions.    You may start the muscle relaxant cyclobenzaprine whenever you would like.    Heat and/or ice may be helpful with this pain.

## 2025-03-12 NOTE — PROGRESS NOTES
Chief Complaint   Patient presents with    Urgent Care    Flank Pain     Left flank pain onset 3-4 days ago- Hx of kidney stones, sx feels similar to kidney stones   Denies fever, chill,  vomiting and urinary sx         ICD-10-CM    1. Acute left-sided thoracic back pain  M54.6 ketorolac (TORADOL) injection 30 mg     cyclobenzaprine (FLEXERIL) 10 MG tablet     naproxen (NAPROSYN) 500 MG tablet      No hematuria and pain is made worse by palpation so doubtful there is kidney stone involvement.    Toradol injection was given and he did have improvement in back pain and there was no allergic reaction..  Instructed him not to use any nonsteroidal anti-inflammatory medications for at least 8 hours after the injection was given.  He may use Tylenol in the meantime.    Red flag warning signs and when to go to the emergency room discussed.  Reviewed potential adverse reactions to medications.    Results for orders placed or performed in visit on 03/12/25 (from the past 24 hours)   UA Macroscopic with reflex to Microscopic and Culture - Clinic Collect    Specimen: Urine, Clean Catch   Result Value Ref Range    Color Urine Yellow Colorless, Straw, Light Yellow, Yellow    Appearance Urine Clear Clear    Glucose Urine Negative Negative mg/dL    Bilirubin Urine Negative Negative    Ketones Urine Negative Negative mg/dL    Specific Gravity Urine 1.025 1.003 - 1.035    Blood Urine Negative Negative    pH Urine 6.0 5.0 - 7.0    Protein Albumin Urine Negative Negative mg/dL    Urobilinogen Urine 0.2 0.2, 1.0 E.U./dL    Nitrite Urine Negative Negative    Leukocyte Esterase Urine Negative Negative    Narrative    Microscopic not indicated       Subjective     Enrico Pedroza is an 54 year old male who presents to clinic today for left back pain for 4 days.  He does have a history of kidney stones in the past and wonders if he has this again.  The pain does not radiate anywhere it is right under his posterior left lowest rib.  He has  not been taking any medication to treat his symptoms.    He denies any numbness or tingling in the extremities, there is no saddle numbness and this been no loss of bowel or bladder control.  He does not recall any specific injury but does remember when he twisted his torso and made the pain worse.      Objective    /81 (BP Location: Right arm, Patient Position: Sitting, Cuff Size: Adult Large)   Pulse 97   Temp 97.8  F (36.6  C) (Oral)   Resp 18   Wt 100.5 kg (221 lb 9.6 oz)   SpO2 95%   BMI 30.91 kg/m    Nurses notes and VS have been reviewed.    Physical Exam       GENERAL APPEARANCE: alert and moderate distress     MS: extremities normal- no gross deformities noted; normal muscle tone.  There is no pain over the actual spinal processes but there is tenderness with palpation over the left thoracic musculature.  Spasms easily inducible with palpation.     SKIN: no suspicious lesions or rashes     NEURO: Normal strength and tone, mentation intact and speech normal. normal sensation to arms and legs.      NIKOLAI Silverman, CNP  [facility] Provider    The use of Dragon/[employer] dictation services may have been used to construct the content in this note; any grammatical or spelling errors are non-intentional. Please contact the author of this note directly if you are in need of any clarification.